# Patient Record
Sex: MALE | Race: WHITE | NOT HISPANIC OR LATINO | Employment: OTHER | URBAN - METROPOLITAN AREA
[De-identification: names, ages, dates, MRNs, and addresses within clinical notes are randomized per-mention and may not be internally consistent; named-entity substitution may affect disease eponyms.]

---

## 2022-05-27 ENCOUNTER — TRANSCRIBE ORDERS (OUTPATIENT)
Dept: ADMINISTRATIVE | Facility: HOSPITAL | Age: 57
End: 2022-05-27

## 2022-05-27 DIAGNOSIS — R06.00 DYSPNEA, UNSPECIFIED TYPE: Primary | ICD-10-CM

## 2022-06-08 ENCOUNTER — HOSPITAL ENCOUNTER (OUTPATIENT)
Dept: CT IMAGING | Facility: HOSPITAL | Age: 57
Discharge: HOME | End: 2022-06-08
Admitting: INTERNAL MEDICINE

## 2022-06-08 DIAGNOSIS — R06.00 DYSPNEA, UNSPECIFIED TYPE: ICD-10-CM

## 2022-06-08 PROCEDURE — 0 IOPAMIDOL PER 1 ML: Performed by: INTERNAL MEDICINE

## 2022-06-08 PROCEDURE — 71275 CT ANGIOGRAPHY CHEST: CPT

## 2022-06-08 RX ADMIN — IOPAMIDOL 100 ML: 755 INJECTION, SOLUTION INTRAVENOUS at 15:01

## 2022-06-21 ENCOUNTER — TRANSCRIBE ORDERS (OUTPATIENT)
Dept: NUCLEAR MEDICINE | Facility: HOSPITAL | Age: 57
End: 2022-06-21

## 2022-06-21 DIAGNOSIS — R06.09 OTHER FORM OF DYSPNEA: Primary | ICD-10-CM

## 2022-07-05 ENCOUNTER — TRANSCRIBE ORDERS (OUTPATIENT)
Dept: ADMINISTRATIVE | Facility: HOSPITAL | Age: 57
End: 2022-07-05

## 2022-07-05 DIAGNOSIS — R06.00 DYSPNEA, UNSPECIFIED TYPE: Primary | ICD-10-CM

## 2022-07-20 ENCOUNTER — HOSPITAL ENCOUNTER (OUTPATIENT)
Dept: CARDIOLOGY | Facility: HOSPITAL | Age: 57
Discharge: HOME | End: 2022-07-20
Admitting: INTERNAL MEDICINE

## 2022-07-20 VITALS
SYSTOLIC BLOOD PRESSURE: 149 MMHG | HEIGHT: 74 IN | DIASTOLIC BLOOD PRESSURE: 87 MMHG | WEIGHT: 315 LBS | BODY MASS INDEX: 40.43 KG/M2

## 2022-07-20 DIAGNOSIS — R06.00 DYSPNEA, UNSPECIFIED TYPE: ICD-10-CM

## 2022-07-20 LAB
AORTIC DIMENSIONLESS INDEX: 0.9 (DI)
BH CV ECHO MEAS - AO MAX PG: 10.1 MMHG
BH CV ECHO MEAS - AO MEAN PG: 5 MMHG
BH CV ECHO MEAS - AO ROOT DIAM: 3.9 CM
BH CV ECHO MEAS - AO V2 MAX: 159 CM/SEC
BH CV ECHO MEAS - AO V2 VTI: 26.6 CM
BH CV ECHO MEAS - AVA(I,D): 3.2 CM2
BH CV ECHO MEAS - EDV(CUBED): 125 ML
BH CV ECHO MEAS - EDV(MOD-SP2): 138 ML
BH CV ECHO MEAS - EDV(MOD-SP4): 140 ML
BH CV ECHO MEAS - EF(MOD-BP): 57.7 %
BH CV ECHO MEAS - EF(MOD-SP2): 59.4 %
BH CV ECHO MEAS - EF(MOD-SP4): 58.6 %
BH CV ECHO MEAS - ESV(CUBED): 23.5 ML
BH CV ECHO MEAS - ESV(MOD-SP2): 56 ML
BH CV ECHO MEAS - ESV(MOD-SP4): 58 ML
BH CV ECHO MEAS - FS: 42.7 %
BH CV ECHO MEAS - IVS/LVPW: 1.08 CM
BH CV ECHO MEAS - IVSD: 1.4 CM
BH CV ECHO MEAS - LAT PEAK E' VEL: 12.2 CM/SEC
BH CV ECHO MEAS - LV DIASTOLIC VOL/BSA (35-75): 52.4 CM2
BH CV ECHO MEAS - LV MASS(C)D: 276.4 GRAMS
BH CV ECHO MEAS - LV MAX PG: 7.6 MMHG
BH CV ECHO MEAS - LV MEAN PG: 4 MMHG
BH CV ECHO MEAS - LV SYSTOLIC VOL/BSA (12-30): 21.7 CM2
BH CV ECHO MEAS - LV V1 MAX: 138 CM/SEC
BH CV ECHO MEAS - LV V1 VTI: 23.6 CM
BH CV ECHO MEAS - LVIDD: 5 CM
BH CV ECHO MEAS - LVIDS: 2.9 CM
BH CV ECHO MEAS - LVOT AREA: 3.6 CM2
BH CV ECHO MEAS - LVOT DIAM: 2.14 CM
BH CV ECHO MEAS - LVPWD: 1.3 CM
BH CV ECHO MEAS - MED PEAK E' VEL: 7.7 CM/SEC
BH CV ECHO MEAS - MV A MAX VEL: 77.6 CM/SEC
BH CV ECHO MEAS - MV DEC SLOPE: 389.5 CM/SEC2
BH CV ECHO MEAS - MV DEC TIME: 0.22 MSEC
BH CV ECHO MEAS - MV E MAX VEL: 74.4 CM/SEC
BH CV ECHO MEAS - MV E/A: 0.96
BH CV ECHO MEAS - MV MAX PG: 3.3 MMHG
BH CV ECHO MEAS - MV MEAN PG: 2.08 MMHG
BH CV ECHO MEAS - MV P1/2T: 66.4 MSEC
BH CV ECHO MEAS - MV V2 VTI: 27.6 CM
BH CV ECHO MEAS - MVA(P1/2T): 3.3 CM2
BH CV ECHO MEAS - MVA(VTI): 3.1 CM2
BH CV ECHO MEAS - PA V2 MAX: 112.3 CM/SEC
BH CV ECHO MEAS - QP/QS: 1.12
BH CV ECHO MEAS - RAP SYSTOLE: 3 MMHG
BH CV ECHO MEAS - RV MAX PG: 3.4 MMHG
BH CV ECHO MEAS - RV V1 MAX: 92.2 CM/SEC
BH CV ECHO MEAS - RV V1 VTI: 18.4 CM
BH CV ECHO MEAS - RVOT DIAM: 2.6 CM
BH CV ECHO MEAS - SI(MOD-SP2): 30.7 ML/M2
BH CV ECHO MEAS - SI(MOD-SP4): 30.7 ML/M2
BH CV ECHO MEAS - SV(LVOT): 85.1 ML
BH CV ECHO MEAS - SV(MOD-SP2): 82 ML
BH CV ECHO MEAS - SV(MOD-SP4): 82 ML
BH CV ECHO MEAS - SV(RVOT): 95.5 ML
BH CV ECHO MEASUREMENTS AVERAGE E/E' RATIO: 7.48
BH CV XLRA - RV BASE: 4 CM
BH CV XLRA - RV LENGTH: 7.6 CM
BH CV XLRA - RV MID: 3.8 CM
BH CV XLRA - TDI S': 16.8 CM/SEC
LEFT ATRIUM VOLUME INDEX: 30.3 ML/M2
MAXIMAL PREDICTED HEART RATE: 166 BPM
SINUS: 3.7 CM
STJ: 3.1 CM
STRESS TARGET HR: 141 BPM

## 2022-07-20 PROCEDURE — 93306 TTE W/DOPPLER COMPLETE: CPT | Performed by: INTERNAL MEDICINE

## 2022-07-20 PROCEDURE — 93306 TTE W/DOPPLER COMPLETE: CPT

## 2022-09-06 ENCOUNTER — HOSPITAL ENCOUNTER (OUTPATIENT)
Facility: HOSPITAL | Age: 57
Setting detail: HOSPITAL OUTPATIENT SURGERY
End: 2022-09-06
Attending: INTERNAL MEDICINE | Admitting: INTERNAL MEDICINE

## 2022-09-06 ENCOUNTER — OFFICE VISIT (OUTPATIENT)
Dept: GASTROENTEROLOGY | Facility: CLINIC | Age: 57
End: 2022-09-06

## 2022-09-06 ENCOUNTER — PREP FOR SURGERY (OUTPATIENT)
Dept: SURGERY | Facility: SURGERY CENTER | Age: 57
End: 2022-09-06

## 2022-09-06 VITALS
DIASTOLIC BLOOD PRESSURE: 96 MMHG | SYSTOLIC BLOOD PRESSURE: 150 MMHG | OXYGEN SATURATION: 97 % | HEART RATE: 114 BPM | HEIGHT: 74 IN | WEIGHT: 315 LBS | BODY MASS INDEX: 40.43 KG/M2 | TEMPERATURE: 98.2 F

## 2022-09-06 DIAGNOSIS — R19.5 POSITIVE FECAL OCCULT BLOOD TEST: ICD-10-CM

## 2022-09-06 DIAGNOSIS — D50.9 IRON DEFICIENCY ANEMIA, UNSPECIFIED IRON DEFICIENCY ANEMIA TYPE: Primary | ICD-10-CM

## 2022-09-06 PROCEDURE — 99203 OFFICE O/P NEW LOW 30 MIN: CPT | Performed by: NURSE PRACTITIONER

## 2022-09-06 RX ORDER — FLUTICASONE PROPIONATE 50 MCG
2 SPRAY, SUSPENSION (ML) NASAL AS NEEDED
COMMUNITY
Start: 2022-07-01

## 2022-09-06 RX ORDER — IPRATROPIUM BROMIDE AND ALBUTEROL SULFATE 2.5; .5 MG/3ML; MG/3ML
3 SOLUTION RESPIRATORY (INHALATION)
COMMUNITY
Start: 2022-08-26 | End: 2022-12-14

## 2022-09-06 RX ORDER — FERROUS SULFATE 325(65) MG
325 TABLET ORAL
COMMUNITY
Start: 2022-06-28

## 2022-09-06 RX ORDER — AMLODIPINE BESYLATE 5 MG/1
5 TABLET ORAL DAILY
COMMUNITY
Start: 2022-06-15

## 2022-09-06 RX ORDER — DEXTROMETHORPHAN HYDROBROMIDE AND PROMETHAZINE HYDROCHLORIDE 15; 6.25 MG/5ML; MG/5ML
SYRUP ORAL 4 TIMES DAILY PRN
COMMUNITY
End: 2022-12-14

## 2022-09-06 RX ORDER — SODIUM CHLORIDE 0.9 % (FLUSH) 0.9 %
10 SYRINGE (ML) INJECTION AS NEEDED
Status: CANCELLED | OUTPATIENT
Start: 2022-09-06

## 2022-09-06 RX ORDER — BENZONATATE 200 MG/1
200 CAPSULE ORAL AS NEEDED
COMMUNITY
Start: 2022-08-03 | End: 2022-12-14

## 2022-09-06 RX ORDER — PANTOPRAZOLE SODIUM 40 MG/1
40 TABLET, DELAYED RELEASE ORAL DAILY
COMMUNITY
Start: 2022-08-28

## 2022-09-06 RX ORDER — CHLORCYCLIZINE HYDROCHLORIDE AND PSEUDOEPHEDRINE HYDROCHLORIDE 25; 60 MG/1; MG/1
1 TABLET ORAL AS NEEDED
COMMUNITY
Start: 2022-06-14

## 2022-09-06 RX ORDER — SODIUM CHLORIDE 0.9 % (FLUSH) 0.9 %
3 SYRINGE (ML) INJECTION EVERY 12 HOURS SCHEDULED
Status: CANCELLED | OUTPATIENT
Start: 2022-09-06

## 2022-09-06 RX ORDER — MELOXICAM 7.5 MG/1
7.5 TABLET ORAL DAILY
COMMUNITY
Start: 2022-06-15

## 2022-09-06 RX ORDER — SODIUM CHLORIDE, SODIUM LACTATE, POTASSIUM CHLORIDE, CALCIUM CHLORIDE 600; 310; 30; 20 MG/100ML; MG/100ML; MG/100ML; MG/100ML
30 INJECTION, SOLUTION INTRAVENOUS CONTINUOUS PRN
Status: CANCELLED | OUTPATIENT
Start: 2022-09-06

## 2022-09-06 NOTE — PROGRESS NOTES
"Chief Complaint   Patient presents with   • Anemia         History of Present Illness  Patient is a 56-year-old male who presents today for evaluation.  He was referred for anemia.  CBC from June 2022 showed hemoglobin of 7.3 and hematocrit 28.0.  He is noted to be microcytic.    Patient reports that the anemia is a new finding.  He has been taking oral iron and reports he had follow-up lab work within the last few weeks that showed hemoglobin had improved to 8.4.  He reports he submitted a stool test for blood which was positive.    He had a COVID infection several months ago and reports he did not eat for around 2-1/2 weeks during that time.  When he resumed eating, he did see some visible blood in the stool described as being bright red but outside of this episode has had no blood in the stool and no dark stools.  Reports regular bowel movements.    Denies any heartburn, reflux, nausea, or vomiting.  Denies any abdominal pain.    Anemia was discovered during work-up for shortness of breath.  Patient has been experiencing a chronic cough for which he reports he has undergone multiple evaluations which were negative.    He has never had a colonoscopy.  Denies any family history of Colon cancer or GI disorders.     Result Review :       CT Angiogram Chest (06/08/2022 15:12)   PROGRESS NOTES - SCAN - PROG NOTE_6/28/22 (06/28/2022)   SCANNED - LABS (06/21/2022)       Vital Signs:   /96   Pulse 114   Temp 98.2 °F (36.8 °C)   Ht 188 cm (74\")   Wt (!) 149 kg (328 lb 3.2 oz)   SpO2 97%   BMI 42.14 kg/m²     Body mass index is 42.14 kg/m².     Physical Exam  Vitals reviewed.   Constitutional:       General: He is not in acute distress.     Appearance: He is well-developed.   HENT:      Head: Normocephalic and atraumatic.   Pulmonary:      Effort: Pulmonary effort is normal. No respiratory distress.   Abdominal:      General: Abdomen is flat. Bowel sounds are normal. There is no distension.      Palpations: " Abdomen is soft.      Tenderness: There is no abdominal tenderness.   Skin:     General: Skin is dry.      Coloration: Skin is not pale.   Neurological:      Mental Status: He is alert and oriented to person, place, and time.   Psychiatric:         Thought Content: Thought content normal.           Assessment and Plan    Diagnoses and all orders for this visit:    1. Iron deficiency anemia, unspecified iron deficiency anemia type (Primary)    2. Positive fecal occult blood test         Discussion  Patient presents today for evaluation of iron deficiency anemia with positive fecal occult blood test.  These are new undiagnosed problems.  Recommended EGD and colonoscopy for further evaluation and to assess for source of bleeding.  If negative, may consider capsule endoscopy.          Follow Up   Return for Follow up to review results after testing complete.    Patient Instructions   Schedule EGD and colonoscopy for further evaluation.

## 2022-09-07 ENCOUNTER — PATIENT ROUNDING (BHMG ONLY) (OUTPATIENT)
Dept: GASTROENTEROLOGY | Facility: CLINIC | Age: 57
End: 2022-09-07

## 2022-09-07 NOTE — PROGRESS NOTES
September 7, 2022    Hello, may I speak with Damián Diaz?    My name is Cherrie      I am  with [facility]  [address] [address] [phone].    Before we get started may I verify your date of birth? 1965-- verified    I am calling to officially welcome you to our practice and ask about your recent visit. Is this a good time to talk? yes    Tell me about your visit with us. What things went well?  no complaints       We're always looking for ways to make our patients' experiences even better. Do you have recommendations on ways we may improve?  no    Overall were you satisfied with your first visit to our practice? yes       I appreciate you taking the time to speak with me today. Is there anything else I can do for you? no      Thank you, and have a great day.

## 2022-10-13 VITALS — WEIGHT: 315 LBS | HEIGHT: 74 IN | BODY MASS INDEX: 40.43 KG/M2

## 2022-10-13 NOTE — SIGNIFICANT NOTE
Education provided the Patient on the following:    - Nothing to Eat or Drink after MN the night before the procedure    - Avoid red/purple fluids while completing their bowel prep as ordered by physician  -Contact Gastrointerologist office for any questions about specific details regarding colon prep    -You will need to have someone drive you home after your colonoscopy and remain with you for 24 hours after the procedure  - The date of your procedure, your are welcome to have one visitor at bedside or remain within 10-15 minutes of [facility]  -Please wear warm socks when you arrive for your procedure  -Remove all jewelry and leave any valuables before arriving the day of your procedure (all will have to be removed before leaving preop)  -You will need to arrive at 1300 on 10/18 procedure    -Feel free to contact us at: [phone] with any additional questions/concerns

## 2022-10-20 ENCOUNTER — PREP FOR SURGERY (OUTPATIENT)
Dept: SURGERY | Facility: SURGERY CENTER | Age: 57
End: 2022-10-20

## 2022-10-20 ENCOUNTER — OFFICE VISIT (OUTPATIENT)
Dept: GASTROENTEROLOGY | Facility: CLINIC | Age: 57
End: 2022-10-20

## 2022-10-20 ENCOUNTER — CONSULT (OUTPATIENT)
Dept: ONCOLOGY | Facility: CLINIC | Age: 57
End: 2022-10-20

## 2022-10-20 ENCOUNTER — LAB (OUTPATIENT)
Dept: OTHER | Facility: HOSPITAL | Age: 57
End: 2022-10-20

## 2022-10-20 VITALS
HEIGHT: 74 IN | DIASTOLIC BLOOD PRESSURE: 82 MMHG | BODY MASS INDEX: 40.43 KG/M2 | HEART RATE: 104 BPM | TEMPERATURE: 97.7 F | WEIGHT: 315 LBS | SYSTOLIC BLOOD PRESSURE: 160 MMHG | OXYGEN SATURATION: 96 %

## 2022-10-20 VITALS
RESPIRATION RATE: 20 BRPM | BODY MASS INDEX: 40.43 KG/M2 | SYSTOLIC BLOOD PRESSURE: 135 MMHG | DIASTOLIC BLOOD PRESSURE: 95 MMHG | OXYGEN SATURATION: 95 % | TEMPERATURE: 97.3 F | HEIGHT: 74 IN | WEIGHT: 315 LBS | HEART RATE: 87 BPM

## 2022-10-20 DIAGNOSIS — D64.9 ANEMIA, UNSPECIFIED TYPE: Primary | ICD-10-CM

## 2022-10-20 DIAGNOSIS — D50.0 IRON DEFICIENCY ANEMIA DUE TO CHRONIC BLOOD LOSS: Primary | ICD-10-CM

## 2022-10-20 DIAGNOSIS — R19.5 POSITIVE FECAL OCCULT BLOOD TEST: ICD-10-CM

## 2022-10-20 DIAGNOSIS — D50.9 IRON DEFICIENCY ANEMIA, UNSPECIFIED IRON DEFICIENCY ANEMIA TYPE: Primary | ICD-10-CM

## 2022-10-20 DIAGNOSIS — R05.3 CHRONIC COUGH: ICD-10-CM

## 2022-10-20 LAB
ANISOCYTOSIS BLD QL: NORMAL
BASOPHILS # BLD AUTO: 0.04 10*3/MM3 (ref 0–0.2)
BASOPHILS NFR BLD AUTO: 0.6 % (ref 0–1.5)
DEPRECATED RDW RBC AUTO: 51.7 FL (ref 37–54)
EOSINOPHIL # BLD AUTO: 0.23 10*3/MM3 (ref 0–0.4)
EOSINOPHIL NFR BLD AUTO: 3.7 % (ref 0.3–6.2)
ERYTHROCYTE [DISTWIDTH] IN BLOOD BY AUTOMATED COUNT: 18.7 % (ref 12.3–15.4)
FERRITIN SERPL-MCNC: 9.1 NG/ML (ref 30–400)
HCT VFR BLD AUTO: 33.2 % (ref 37.5–51)
HGB BLD-MCNC: 9 G/DL (ref 13–17.7)
HGB RETIC QN AUTO: 13.1 PG (ref 29.8–36.1)
HYPOCHROMIA BLD QL: NORMAL
IMM GRANULOCYTES # BLD AUTO: 0.01 10*3/MM3 (ref 0–0.05)
IMM GRANULOCYTES NFR BLD AUTO: 0.2 % (ref 0–0.5)
IMM RETICS NFR: 1.5 % (ref 3–15.8)
IRON 24H UR-MRATE: 14 MCG/DL (ref 59–158)
IRON SATN MFR SERPL: 3 % (ref 20–50)
LYMPHOCYTES # BLD AUTO: 2.17 10*3/MM3 (ref 0.7–3.1)
LYMPHOCYTES NFR BLD AUTO: 34.8 % (ref 19.6–45.3)
MCH RBC QN AUTO: 20.5 PG (ref 26.6–33)
MCHC RBC AUTO-ENTMCNC: 27.1 G/DL (ref 31.5–35.7)
MCV RBC AUTO: 75.5 FL (ref 79–97)
MONOCYTES # BLD AUTO: 0.92 10*3/MM3 (ref 0.1–0.9)
MONOCYTES NFR BLD AUTO: 14.7 % (ref 5–12)
NEUTROPHILS NFR BLD AUTO: 2.87 10*3/MM3 (ref 1.7–7)
NEUTROPHILS NFR BLD AUTO: 46 % (ref 42.7–76)
NRBC BLD AUTO-RTO: 0 /100 WBC (ref 0–0.2)
OVALOCYTES BLD QL SMEAR: NORMAL
PLAT MORPH BLD: NORMAL
PLATELET # BLD AUTO: 327 10*3/MM3 (ref 140–450)
PMV BLD AUTO: 8.9 FL (ref 6–12)
POLYCHROMASIA BLD QL SMEAR: NORMAL
RBC # BLD AUTO: 4.4 10*6/MM3 (ref 4.14–5.8)
RETICS # AUTO: <0.01 10*6/MM3 (ref 0.02–0.13)
RETICS/RBC NFR AUTO: 0.11 % (ref 0.7–1.9)
STOMATOCYTES BLD QL SMEAR: NORMAL
TARGETS BLD QL SMEAR: NORMAL
TIBC SERPL-MCNC: 507 MCG/DL (ref 298–536)
TRANSFERRIN SERPL-MCNC: 340 MG/DL (ref 200–360)
WBC MORPH BLD: NORMAL
WBC NRBC COR # BLD: 6.24 10*3/MM3 (ref 3.4–10.8)

## 2022-10-20 PROCEDURE — 82728 ASSAY OF FERRITIN: CPT | Performed by: INTERNAL MEDICINE

## 2022-10-20 PROCEDURE — 85025 COMPLETE CBC W/AUTO DIFF WBC: CPT | Performed by: INTERNAL MEDICINE

## 2022-10-20 PROCEDURE — 83540 ASSAY OF IRON: CPT | Performed by: INTERNAL MEDICINE

## 2022-10-20 PROCEDURE — 84466 ASSAY OF TRANSFERRIN: CPT | Performed by: INTERNAL MEDICINE

## 2022-10-20 PROCEDURE — 99214 OFFICE O/P EST MOD 30 MIN: CPT | Performed by: NURSE PRACTITIONER

## 2022-10-20 PROCEDURE — 36415 COLL VENOUS BLD VENIPUNCTURE: CPT

## 2022-10-20 PROCEDURE — 85046 RETICYTE/HGB CONCENTRATE: CPT | Performed by: INTERNAL MEDICINE

## 2022-10-20 PROCEDURE — 99204 OFFICE O/P NEW MOD 45 MIN: CPT | Performed by: INTERNAL MEDICINE

## 2022-10-20 PROCEDURE — 85007 BL SMEAR W/DIFF WBC COUNT: CPT | Performed by: INTERNAL MEDICINE

## 2022-10-20 RX ORDER — SODIUM CHLORIDE 9 MG/ML
250 INJECTION, SOLUTION INTRAVENOUS ONCE
Status: CANCELLED | OUTPATIENT
Start: 2022-11-22

## 2022-10-20 RX ORDER — SODIUM CHLORIDE 9 MG/ML
250 INJECTION, SOLUTION INTRAVENOUS ONCE
Status: CANCELLED | OUTPATIENT
Start: 2022-11-01

## 2022-10-20 RX ORDER — ACETAMINOPHEN 325 MG/1
650 TABLET ORAL ONCE
Status: CANCELLED | OUTPATIENT
Start: 2022-11-15

## 2022-10-20 RX ORDER — ACETAMINOPHEN 325 MG/1
650 TABLET ORAL ONCE
Status: CANCELLED | OUTPATIENT
Start: 2022-11-01

## 2022-10-20 RX ORDER — DIPHENHYDRAMINE HCL 25 MG
25 CAPSULE ORAL ONCE
Status: CANCELLED | OUTPATIENT
Start: 2022-11-29

## 2022-10-20 RX ORDER — DIPHENHYDRAMINE HCL 25 MG
25 CAPSULE ORAL ONCE
Status: CANCELLED | OUTPATIENT
Start: 2022-11-01

## 2022-10-20 RX ORDER — DIPHENHYDRAMINE HCL 25 MG
25 CAPSULE ORAL ONCE
Status: CANCELLED | OUTPATIENT
Start: 2022-11-15

## 2022-10-20 RX ORDER — DIPHENHYDRAMINE HCL 25 MG
25 CAPSULE ORAL ONCE
Status: CANCELLED | OUTPATIENT
Start: 2022-11-22

## 2022-10-20 RX ORDER — ACETAMINOPHEN 325 MG/1
650 TABLET ORAL ONCE
Status: CANCELLED | OUTPATIENT
Start: 2022-11-08

## 2022-10-20 RX ORDER — ACETAMINOPHEN 325 MG/1
650 TABLET ORAL ONCE
Status: CANCELLED | OUTPATIENT
Start: 2022-11-22

## 2022-10-20 RX ORDER — ACETAMINOPHEN 325 MG/1
650 TABLET ORAL ONCE
Status: CANCELLED | OUTPATIENT
Start: 2022-11-29

## 2022-10-20 RX ORDER — SODIUM CHLORIDE 0.9 % (FLUSH) 0.9 %
3 SYRINGE (ML) INJECTION EVERY 12 HOURS SCHEDULED
Status: CANCELLED | OUTPATIENT
Start: 2022-11-03

## 2022-10-20 RX ORDER — DIPHENHYDRAMINE HCL 25 MG
25 CAPSULE ORAL ONCE
Status: CANCELLED | OUTPATIENT
Start: 2022-11-08

## 2022-10-20 RX ORDER — PROMETHAZINE HYDROCHLORIDE AND CODEINE PHOSPHATE 6.25; 1 MG/5ML; MG/5ML
SOLUTION ORAL AS NEEDED
COMMUNITY
Start: 2022-10-19 | End: 2022-11-07 | Stop reason: SDUPTHER

## 2022-10-20 RX ORDER — SODIUM CHLORIDE 9 MG/ML
250 INJECTION, SOLUTION INTRAVENOUS ONCE
Status: CANCELLED | OUTPATIENT
Start: 2022-11-15

## 2022-10-20 RX ORDER — SODIUM CHLORIDE 9 MG/ML
250 INJECTION, SOLUTION INTRAVENOUS ONCE
Status: CANCELLED | OUTPATIENT
Start: 2022-11-08

## 2022-10-20 RX ORDER — SODIUM CHLORIDE 9 MG/ML
250 INJECTION, SOLUTION INTRAVENOUS ONCE
Status: CANCELLED | OUTPATIENT
Start: 2022-11-29

## 2022-10-20 RX ORDER — SODIUM CHLORIDE, SODIUM LACTATE, POTASSIUM CHLORIDE, CALCIUM CHLORIDE 600; 310; 30; 20 MG/100ML; MG/100ML; MG/100ML; MG/100ML
30 INJECTION, SOLUTION INTRAVENOUS CONTINUOUS PRN
Status: CANCELLED | OUTPATIENT
Start: 2022-11-03

## 2022-10-20 RX ORDER — SODIUM CHLORIDE 0.9 % (FLUSH) 0.9 %
10 SYRINGE (ML) INJECTION AS NEEDED
Status: CANCELLED | OUTPATIENT
Start: 2022-11-03

## 2022-10-20 NOTE — PROGRESS NOTES
.     REASON FOR CONSULTATION:   Anemia  Provide an opinion on any further workup or treatment                             REQUESTING PHYSICIAN: No ref. provider found  RECORDS OBTAINED:  Records of the patient's history including those obtained from the referring provider were reviewed and summarized in detail.    HISTORY OF PRESENT ILLNESS:  The patient is a 56 y.o. year old male  who is here for follow-up with the above-mentioned history.    We were asked to see this patient urgently by the GI nurse practitioner across the gold today.  Last labs in the [facility] system were in June.  She noted PCP labs last week showed an Hb of around 8.  Chronic cough and has been evaluated by cardiology, pulmonology, ENT with no source of cough found.  Symptoms began after COVID infection early 2022.  Denied bowel complaints or visible blood in stools or dark stools.  Previously heme positive EGD and colonoscopy planned.    6/21/2022: Hb 7.3.  Oral iron daily started.  Patient states early October 2022 Hb around 8.  10/20/2022: Hb 9.  Patient states he cannot tolerate more oral iron due to constipation and abdominal cramping.    Lightheadedness, dyspnea on exertion.  No chest pain.  No melena or hematochezia.  EGD and colonoscopy scheduled for 11/3/2022.    Past Medical History:   Diagnosis Date   • Hypertension      Past Surgical History:   Procedure Laterality Date   • HERNIA REPAIR  1970       MEDICATIONS    Current Outpatient Medications:   •  amLODIPine (NORVASC) 5 MG tablet, , Disp: , Rfl:   •  benzonatate (TESSALON) 200 MG capsule, , Disp: , Rfl:   •  Chlorcyclizine-Pseudoephed (Stahist AD) 25-60 MG tablet, Take 1 tablet 3 times a day by oral route as needed., Disp: , Rfl:   •  ferrous sulfate 325 (65 FE) MG tablet, Take 1 tablet every day by oral route for 90 days., Disp: , Rfl:   •  fluticasone (FLONASE) 50 MCG/ACT nasal spray, , Disp: , Rfl:   •  ipratropium-albuterol (DUO-NEB) 0.5-2.5 mg/3 ml nebulizer, Inhale 3 mL  "4 times a day by nebulization route for 30 days., Disp: , Rfl:   •  meloxicam (MOBIC) 7.5 MG tablet, , Disp: , Rfl:   •  pantoprazole (PROTONIX) 40 MG EC tablet, , Disp: , Rfl:   •  promethazine-codeine (PHENERGAN with CODEINE) 6.25-10 MG/5ML solution, , Disp: , Rfl:   •  promethazine-dextromethorphan (PROMETHAZINE-DM) 6.25-15 MG/5ML syrup, Take 5 mL every 4 hours by oral route as needed., Disp: , Rfl:     ALLERGIES:   No Known Allergies    SOCIAL HISTORY:       Social History     Socioeconomic History   • Marital status: Single   Tobacco Use   • Smoking status: Never   • Smokeless tobacco: Never   Vaping Use   • Vaping Use: Some days   Substance and Sexual Activity   • Alcohol use: Yes     Comment: rarely   • Drug use: Never   • Sexual activity: Defer         FAMILY HISTORY:  Family History   Problem Relation Age of Onset   • Colon cancer Neg Hx    • Crohn's disease Neg Hx    • Colon polyps Neg Hx    • Irritable bowel syndrome Neg Hx    • Ulcerative colitis Neg Hx        REVIEW OF SYSTEMS:  Review of Systems   Constitutional: Negative for activity change.   HENT: Negative for nosebleeds and trouble swallowing.    Respiratory: Negative for shortness of breath and wheezing.    Cardiovascular: Negative for chest pain and palpitations.   Gastrointestinal: Positive for constipation. Negative for diarrhea and nausea.   Genitourinary: Negative for dysuria and hematuria.   Musculoskeletal: Negative for arthralgias and myalgias.   Neurological: Negative for seizures and syncope.   Hematological: Negative for adenopathy. Does not bruise/bleed easily.   Psychiatric/Behavioral: Negative for confusion.              Vitals:    10/20/22 1121   BP: 135/95   Pulse: 87   Resp: 20   Temp: 97.3 °F (36.3 °C)   TempSrc: Temporal   SpO2: 95%   Weight: (!) 146 kg (322 lb 6.4 oz)   Height: 188 cm (74.02\")   PainSc: 0-No pain     Current Status 10/20/2022   ECOG score 0      PHYSICAL EXAM:      CONSTITUTIONAL:  Vital signs reviewed.  No " distress, looks comfortable.  EYES:  Conjunctivae and lids unremarkable.  PERRLA  EARS,NOSE,MOUTH,THROAT:  Ears and nose appear unremarkable.  Lips, teeth, gums appear unremarkable.  RESPIRATORY:  Normal respiratory effort.  Lungs clear to auscultation bilaterally.  CARDIOVASCULAR:  Normal S1, S2.  No murmurs rubs or gallops.  No significant lower extremity edema.  GASTROINTESTINAL: Abdomen appears unremarkable.  Nontender.  No hepatomegaly.  No splenomegaly.  LYMPHATIC:  No cervical, supraclavicular, axillary lymphadenopathy.  MUSCULOSKELETAL:  Unremarkable gait and station.  Unremarkable digits/nails.  No cyanosis or clubbing.  SKIN:  Warm.  No rashes.  PSYCHIATRIC:  Normal judgment and insight.  Normal mood and affect.      RECENT LABS:        WBC   Date Value Ref Range Status   10/20/2022 6.24 3.40 - 10.80 10*3/mm3 Final     Hemoglobin   Date Value Ref Range Status   10/20/2022 9.0 (L) 13.0 - 17.7 g/dL Final     Platelets   Date Value Ref Range Status   10/20/2022 327 140 - 450 10*3/mm3 Final       Assessment & Plan   There are no diagnoses linked to this encounter.      Damián Diaz   *Iron deficiency anemia  · 6/21/2022: Hb 7.3.  Oral iron daily started.  · Patient states early October 2022 Hb around 8.  · 10/20/2022: Ferritin 9.  3% saturation.  Hb 9.  Patient states he cannot tolerate oral iron anymore due to constipation and abdominal cramping.  Injectafer x2 doses if insurance will allow.  If not, Venofer 200 mg x 5 doses  · I explained IV iron as a possibility of life-threatening allergic reactions.  Patient understands this and wants to proceed.     *Source of iron deficiency  · Following with GI (GI referred to us to consider IV iron)  · EGD and colonoscopy planned 11/3/2022    *Microcytosis, likely due to iron deficiency  MCV 75.5    *Lightheadedness, dyspnea on exertion, fatigue.  Hopefully this will improve with IV iron.    *Class III obesity.  Being overweight can lead to cytopenias through hepatic  steatosis.    Body mass index is 41.38 kg/m².  BMI 25 to <30 is overweight  BMI 30 to <35 is class 1 obesity  BMI 35 to <40 is class 2 obesity  BMI 40 or higher is class 3 obesity   Ideally, lose weight    Plan  · IV iron  · MD 6 to 8 weeks.  1 day prior: Ferritin, iron panel, CBC, reticulate hemoglobin  · Stop oral iron    Records reviewed and summarized.  This was my first time meeting the patient

## 2022-10-20 NOTE — PROGRESS NOTES
"Chief Complaint   Patient presents with   • GI Problem         History of Present Illness  Patient is a 56-year-old male who presents today for Follow-up.  He was seen in the office September 2022 for iron deficiency anemia with positive fecal occult blood test.  EGD and colonoscopy are planned for further evaluation.    Patient reports he has had continued symptoms since his last office visit.  He had lab work with his primary care provider last week that showed hemoglobin was around 8.  He continues to report shortness of breath and fatigue.  Continues to report chronic cough that can be severe and at times causes him to have syncopal episodes.    He has had extensive work-up to date including cardiology, pulmonology, and ENT evaluation with no source of the cough found.  Symptoms did begin following COVID infection earlier in the year.    He denies any heartburn, reflux, nausea, or vomiting.  Denies any abdominal pain.  Denies any bowel complaints, visible blood in stool, or dark stools.    He has been taking oral iron for several months with minimal globin.     Result Review :       Office Visit with Delores Norman APRN (09/06/2022)   CT Angiogram Chest (06/08/2022 15:12)   PROGRESS NOTES - SCAN - PROG NOTE_6/28/22 (06/28/2022)   SCANNED - LABS (06/21/2022)       Vital Signs:   /82   Pulse 104   Temp 97.7 °F (36.5 °C)   Ht 188 cm (74\")   Wt (!) 146 kg (322 lb 1.6 oz)   SpO2 96%   BMI 41.36 kg/m²     Body mass index is 41.36 kg/m².     Physical Exam  Vitals reviewed.   Constitutional:       General: He is not in acute distress.     Appearance: He is well-developed.   HENT:      Head: Normocephalic and atraumatic.   Pulmonary:      Effort: Pulmonary effort is normal. No respiratory distress.   Abdominal:      General: Abdomen is flat. Bowel sounds are normal. There is no distension.      Palpations: Abdomen is soft.      Tenderness: There is no abdominal tenderness.   Skin:     General: Skin is dry. "      Coloration: Skin is not pale.   Neurological:      Mental Status: He is alert and oriented to person, place, and time.   Psychiatric:         Thought Content: Thought content normal.           Assessment and Plan    Diagnoses and all orders for this visit:    1. Iron deficiency anemia, unspecified iron deficiency anemia type (Primary)  -     Ambulatory Referral to Hematology    2. Positive fecal occult blood test    3. Chronic cough         Discussion  Patient presents today for follow-up with persistent symptoms.  We will arrange for EGD and colonoscopy to be performed as quickly as possible to evaluate anemia.  Due to persistent anemia with minimal improvement with oral iron replacement, recommend hematology consultation.  Regarding chronic cough, if GI work-up negative and this persists, discussed consideration for referral to [facility] clinic to see if they have any additional recommendations that could provide.          Follow Up   Return for Follow up to review results after testing complete.    Patient Instructions   Proceed with EGD and colonoscopy for further evaluation of anemia and positive fecal occult blood test.    Refer to hematology for further evaluation and management of anemia.

## 2022-10-20 NOTE — PATIENT INSTRUCTIONS
Proceed with EGD and colonoscopy for further evaluation of anemia and positive fecal occult blood test.    Refer to hematology for further evaluation and management of anemia.

## 2022-10-27 ENCOUNTER — APPOINTMENT (OUTPATIENT)
Dept: ONCOLOGY | Facility: HOSPITAL | Age: 57
End: 2022-10-27

## 2022-11-01 ENCOUNTER — INFUSION (OUTPATIENT)
Dept: ONCOLOGY | Facility: HOSPITAL | Age: 57
End: 2022-11-01

## 2022-11-01 VITALS
WEIGHT: 315 LBS | TEMPERATURE: 97.1 F | OXYGEN SATURATION: 98 % | HEIGHT: 74 IN | HEART RATE: 82 BPM | SYSTOLIC BLOOD PRESSURE: 150 MMHG | DIASTOLIC BLOOD PRESSURE: 99 MMHG | BODY MASS INDEX: 40.43 KG/M2 | RESPIRATION RATE: 18 BRPM

## 2022-11-01 DIAGNOSIS — D50.0 IRON DEFICIENCY ANEMIA DUE TO CHRONIC BLOOD LOSS: Primary | ICD-10-CM

## 2022-11-01 PROCEDURE — 96365 THER/PROPH/DIAG IV INF INIT: CPT

## 2022-11-01 PROCEDURE — 63710000001 DIPHENHYDRAMINE PER 50 MG: Performed by: INTERNAL MEDICINE

## 2022-11-01 PROCEDURE — 25010000002 IRON SUCROSE PER 1 MG: Performed by: INTERNAL MEDICINE

## 2022-11-01 RX ORDER — SODIUM CHLORIDE 9 MG/ML
250 INJECTION, SOLUTION INTRAVENOUS ONCE
Status: COMPLETED | OUTPATIENT
Start: 2022-11-01 | End: 2022-11-01

## 2022-11-01 RX ORDER — DIPHENHYDRAMINE HCL 25 MG
25 CAPSULE ORAL ONCE
Status: COMPLETED | OUTPATIENT
Start: 2022-11-01 | End: 2022-11-01

## 2022-11-01 RX ORDER — ACETAMINOPHEN 325 MG/1
650 TABLET ORAL ONCE
Status: COMPLETED | OUTPATIENT
Start: 2022-11-01 | End: 2022-11-01

## 2022-11-01 RX ADMIN — IRON SUCROSE 200 MG: 20 INJECTION, SOLUTION INTRAVENOUS at 10:38

## 2022-11-01 RX ADMIN — ACETAMINOPHEN 650 MG: 325 TABLET ORAL at 10:18

## 2022-11-01 RX ADMIN — SODIUM CHLORIDE 250 ML: 9 INJECTION, SOLUTION INTRAVENOUS at 10:18

## 2022-11-01 RX ADMIN — DIPHENHYDRAMINE HYDROCHLORIDE 25 MG: 25 CAPSULE ORAL at 10:18

## 2022-11-03 ENCOUNTER — ANESTHESIA (OUTPATIENT)
Dept: GASTROENTEROLOGY | Facility: HOSPITAL | Age: 57
End: 2022-11-03

## 2022-11-03 ENCOUNTER — HOSPITAL ENCOUNTER (OUTPATIENT)
Facility: HOSPITAL | Age: 57
Setting detail: HOSPITAL OUTPATIENT SURGERY
Discharge: HOME | End: 2022-11-03
Attending: INTERNAL MEDICINE | Admitting: INTERNAL MEDICINE

## 2022-11-03 ENCOUNTER — ANESTHESIA EVENT (OUTPATIENT)
Dept: GASTROENTEROLOGY | Facility: HOSPITAL | Age: 57
End: 2022-11-03

## 2022-11-03 VITALS
DIASTOLIC BLOOD PRESSURE: 77 MMHG | HEIGHT: 74 IN | WEIGHT: 314 LBS | OXYGEN SATURATION: 96 % | RESPIRATION RATE: 16 BRPM | HEART RATE: 95 BPM | SYSTOLIC BLOOD PRESSURE: 120 MMHG | BODY MASS INDEX: 40.3 KG/M2

## 2022-11-03 DIAGNOSIS — D50.9 IRON DEFICIENCY ANEMIA, UNSPECIFIED IRON DEFICIENCY ANEMIA TYPE: ICD-10-CM

## 2022-11-03 DIAGNOSIS — R19.5 POSITIVE FECAL OCCULT BLOOD TEST: ICD-10-CM

## 2022-11-03 PROCEDURE — 45385 COLONOSCOPY W/LESION REMOVAL: CPT | Performed by: INTERNAL MEDICINE

## 2022-11-03 PROCEDURE — 25010000002 PROPOFOL 10 MG/ML EMULSION: Performed by: NURSE ANESTHETIST, CERTIFIED REGISTERED

## 2022-11-03 PROCEDURE — 88342 IMHCHEM/IMCYTCHM 1ST ANTB: CPT | Performed by: INTERNAL MEDICINE

## 2022-11-03 PROCEDURE — 43239 EGD BIOPSY SINGLE/MULTIPLE: CPT | Performed by: INTERNAL MEDICINE

## 2022-11-03 PROCEDURE — 88305 TISSUE EXAM BY PATHOLOGIST: CPT | Performed by: INTERNAL MEDICINE

## 2022-11-03 PROCEDURE — 45381 COLONOSCOPY SUBMUCOUS NJX: CPT | Performed by: INTERNAL MEDICINE

## 2022-11-03 PROCEDURE — 45380 COLONOSCOPY AND BIOPSY: CPT | Performed by: INTERNAL MEDICINE

## 2022-11-03 RX ORDER — SODIUM CHLORIDE, SODIUM LACTATE, POTASSIUM CHLORIDE, CALCIUM CHLORIDE 600; 310; 30; 20 MG/100ML; MG/100ML; MG/100ML; MG/100ML
30 INJECTION, SOLUTION INTRAVENOUS CONTINUOUS PRN
Status: DISCONTINUED | OUTPATIENT
Start: 2022-11-03 | End: 2022-11-03 | Stop reason: HOSPADM

## 2022-11-03 RX ORDER — LIDOCAINE HYDROCHLORIDE 20 MG/ML
INJECTION, SOLUTION INFILTRATION; PERINEURAL AS NEEDED
Status: DISCONTINUED | OUTPATIENT
Start: 2022-11-03 | End: 2022-11-03 | Stop reason: SURG

## 2022-11-03 RX ORDER — GLYCOPYRROLATE 0.2 MG/ML
INJECTION INTRAMUSCULAR; INTRAVENOUS AS NEEDED
Status: DISCONTINUED | OUTPATIENT
Start: 2022-11-03 | End: 2022-11-03 | Stop reason: SURG

## 2022-11-03 RX ORDER — PROPOFOL 10 MG/ML
VIAL (ML) INTRAVENOUS AS NEEDED
Status: DISCONTINUED | OUTPATIENT
Start: 2022-11-03 | End: 2022-11-03 | Stop reason: SURG

## 2022-11-03 RX ORDER — ONDANSETRON 2 MG/ML
4 INJECTION INTRAMUSCULAR; INTRAVENOUS ONCE AS NEEDED
Status: DISCONTINUED | OUTPATIENT
Start: 2022-11-03 | End: 2022-11-03 | Stop reason: HOSPADM

## 2022-11-03 RX ADMIN — PROPOFOL 300 MCG/KG/MIN: 10 INJECTION, EMULSION INTRAVENOUS at 13:30

## 2022-11-03 RX ADMIN — SODIUM CHLORIDE, POTASSIUM CHLORIDE, SODIUM LACTATE AND CALCIUM CHLORIDE 30 ML/HR: 600; 310; 30; 20 INJECTION, SOLUTION INTRAVENOUS at 13:24

## 2022-11-03 RX ADMIN — PROPOFOL 130 MG: 10 INJECTION, EMULSION INTRAVENOUS at 13:30

## 2022-11-03 RX ADMIN — LIDOCAINE HYDROCHLORIDE 100 MG: 20 INJECTION, SOLUTION INFILTRATION; PERINEURAL at 13:30

## 2022-11-03 RX ADMIN — GLYCOPYRROLATE 0.2 MG: 0.2 INJECTION INTRAMUSCULAR; INTRAVENOUS at 13:29

## 2022-11-03 NOTE — H&P
No chief complaint on file.      HPI  Pos fecal occult blood  anemia         Problem List:    Patient Active Problem List   Diagnosis   • Iron deficiency anemia   • Positive fecal occult blood test       Medical History:    Past Medical History:   Diagnosis Date   • Chronic cough     SINCE COVID DIAGNOSIS 9/2021   • History of COVID-19 09/2021   • Hypertension    • Iron deficiency anemia         Social History:    Social History     Socioeconomic History   • Marital status: Single   Tobacco Use   • Smoking status: Never   • Smokeless tobacco: Never   Substance and Sexual Activity   • Alcohol use: Yes     Comment: rarely   • Drug use: Never   • Sexual activity: Defer       Family History:   Family History   Problem Relation Age of Onset   • Heart failure Mother    • Clotting disorder Father    • Hypertension Brother    • Colon cancer Neg Hx    • Crohn's disease Neg Hx    • Colon polyps Neg Hx    • Irritable bowel syndrome Neg Hx    • Ulcerative colitis Neg Hx    • Malig Hyperthermia Neg Hx        Surgical History:   Past Surgical History:   Procedure Laterality Date   • HERNIA REPAIR  1970   • LACERATION REPAIR Right     THUMB/ HAND       No current facility-administered medications for this encounter.    Current Outpatient Medications:   •  amLODIPine (NORVASC) 5 MG tablet, Take 1 tablet by mouth Daily., Disp: , Rfl:   •  benzonatate (TESSALON) 200 MG capsule, Take 1 capsule by mouth As Needed., Disp: , Rfl:   •  Chlorcyclizine-Pseudoephed (Stahist AD) 25-60 MG tablet, Take  by mouth As Needed. HELD FOR OR, Disp: , Rfl:   •  ferrous sulfate 325 (65 FE) MG tablet, Take 1 tablet by mouth Daily With Breakfast., Disp: , Rfl:   •  fluticasone (FLONASE) 50 MCG/ACT nasal spray, 2 sprays into the nostril(s) as directed by provider As Needed., Disp: , Rfl:   •  ipratropium-albuterol (DUO-NEB) 0.5-2.5 mg/3 ml nebulizer, Take 3 mL by nebulization 4 (Four) Times a Day., Disp: , Rfl:   •  meloxicam (MOBIC) 7.5 MG tablet, Take 1  tablet by mouth Daily., Disp: , Rfl:   •  pantoprazole (PROTONIX) 40 MG EC tablet, Take 1 tablet by mouth Daily., Disp: , Rfl:   •  promethazine-codeine (PHENERGAN with CODEINE) 6.25-10 MG/5ML solution, Take  by mouth As Needed., Disp: , Rfl:   •  promethazine-dextromethorphan (PROMETHAZINE-DM) 6.25-15 MG/5ML syrup, Take  by mouth 4 (Four) Times a Day As Needed., Disp: , Rfl:     Allergies: No Known Allergies     The following portions of the patient's history were reviewed by me and updated as appropriate: review of systems, allergies, current medications, past family history, past medical history, past social history, past surgical history and problem list.    There were no vitals filed for this visit.    PHYSICAL EXAM:    CONSTITUTIONAL:  today's vital signs reviewed by me  GASTROINTESTINAL: abdomen is soft nontender nondistended with normal active bowel sounds, no masses are appreciated    Assessment/ Plan  Pos fecal occult blood  Anemia    egd and colonoscopy    Risks and benefits as well as alternatives to endoscopic evaluation were explained to the patient and they voiced understanding and wish to proceed.  These risks include but are not limited to the risk of bleeding, perforation, adverse reaction to sedation, and missed lesions.  The patient was given the opportunity to ask questions prior to the endoscopic procedure.

## 2022-11-03 NOTE — ANESTHESIA PREPROCEDURE EVALUATION
Anesthesia Evaluation     Patient summary reviewed and Nursing notes reviewed   no history of anesthetic complications:  NPO Solid Status: > 6 hours  NPO Liquid Status: > 6 hours           Airway   Mallampati: II  TM distance: >3 FB  Neck ROM: full  no difficulty expected and No difficulty expected  Dental - normal exam     Pulmonary - negative pulmonary ROS and normal exam    breath sounds clear to auscultation  (-) rhonchi, decreased breath sounds, wheezes, rales, stridor  Cardiovascular - normal exam    NYHA Classification: I  Rhythm: regular  Rate: normal    (+) hypertension,   (-) murmur, weak pulses, friction rub, systolic click, carotid bruits, JVD, peripheral edema      Neuro/Psych- negative ROS  GI/Hepatic/Renal/Endo    (+) obesity,  GI bleeding ,     Musculoskeletal (-) negative ROS    Abdominal  - normal exam    Abdomen: soft.   Substance History - negative use     OB/GYN negative ob/gyn ROS         Other   blood dyscrasia anemia,                     Anesthesia Plan    ASA 2     MAC     intravenous induction     Anesthetic plan, risks, benefits, and alternatives have been provided, discussed and informed consent has been obtained with: patient.        CODE STATUS:

## 2022-11-03 NOTE — ANESTHESIA POSTPROCEDURE EVALUATION
"Patient: Damián Diaz    Procedure Summary     Date: 11/03/22 Room / Location:  [facility]    Anesthesia Start: 1327 Anesthesia Stop: 1401    Procedures:       ESOPHAGOGASTRODUODENOSCOPYr with bx (Esophagus)      COLONOSCOPY into cecum with tattoo ink injection, bx's and cold bx/snare polypectomies Diagnosis:       Iron deficiency anemia, unspecified iron deficiency anemia type      Positive fecal occult blood test      (Iron deficiency anemia, unspecified iron deficiency anemia type [D50.9])      (Positive fecal occult blood test [R19.5])    Surgeons: Anup Oconnell MD Provider: Mor Stephenson MD    Anesthesia Type: MAC ASA Status: 2          Anesthesia Type: MAC    Vitals  Vitals Value Taken Time   /82 11/03/22 1409   Temp     Pulse 100 11/03/22 1409   Resp 16 11/03/22 1409   SpO2 100 % 11/03/22 1409           Post Anesthesia Care and Evaluation    Patient location during evaluation: bedside  Patient participation: complete - patient participated  Level of consciousness: awake and alert  Pain management: adequate    Airway patency: patent  Anesthetic complications: No anesthetic complications    Cardiovascular status: acceptable  Respiratory status: acceptable  Hydration status: acceptable    Comments: /82 (BP Location: Left arm, Patient Position: Lying)   Pulse 100   Resp 16   Ht 188 cm (74\")   Wt (!) 142 kg (314 lb)   SpO2 100%   BMI 40.32 kg/m²           "

## 2022-11-03 NOTE — DISCHARGE INSTRUCTIONS
For the next 24 hours patient needs to be with a responsible adult.    For 24 hours DO NOT drive, operate machinery, appliances, drink alcohol, make important decisions or sign legal documents.    Start with a light or bland diet if you are feeling sick to your stomach otherwise advance to regular diet as tolerated.    Follow recommendations on procedure report if provided by your doctor.    Call Dr Oconnell for problems [phone]    Problems may include but not limited to: large amounts of bleeding, trouble breathing, repeated vomiting, severe unrelieved pain, fever or chills.

## 2022-11-04 ENCOUNTER — APPOINTMENT (OUTPATIENT)
Dept: ONCOLOGY | Facility: HOSPITAL | Age: 57
End: 2022-11-04

## 2022-11-04 DIAGNOSIS — D50.0 IRON DEFICIENCY ANEMIA DUE TO CHRONIC BLOOD LOSS: Primary | ICD-10-CM

## 2022-11-04 DIAGNOSIS — D50.0 IRON DEFICIENCY ANEMIA DUE TO CHRONIC BLOOD LOSS: ICD-10-CM

## 2022-11-04 DIAGNOSIS — C18.9 ADENOCARCINOMA OF COLON: Primary | ICD-10-CM

## 2022-11-04 DIAGNOSIS — C18.9 ADENOCARCINOMA OF COLON: ICD-10-CM

## 2022-11-07 ENCOUNTER — HOSPITAL ENCOUNTER (OUTPATIENT)
Dept: CT IMAGING | Facility: HOSPITAL | Age: 57
Discharge: HOME | End: 2022-11-07

## 2022-11-07 ENCOUNTER — LAB (OUTPATIENT)
Dept: LAB | Facility: HOSPITAL | Age: 57
End: 2022-11-07

## 2022-11-07 ENCOUNTER — HOSPITAL ENCOUNTER (OUTPATIENT)
Dept: CARDIOLOGY | Facility: HOSPITAL | Age: 57
Discharge: HOME | End: 2022-11-07

## 2022-11-07 ENCOUNTER — OFFICE VISIT (OUTPATIENT)
Dept: SURGERY | Facility: CLINIC | Age: 57
End: 2022-11-07

## 2022-11-07 VITALS — WEIGHT: 315 LBS | BODY MASS INDEX: 40.43 KG/M2 | HEIGHT: 74 IN

## 2022-11-07 DIAGNOSIS — D50.0 IRON DEFICIENCY ANEMIA DUE TO CHRONIC BLOOD LOSS: ICD-10-CM

## 2022-11-07 DIAGNOSIS — C18.2 CANCER OF ASCENDING COLON: Primary | ICD-10-CM

## 2022-11-07 DIAGNOSIS — C18.2 CANCER OF ASCENDING COLON: ICD-10-CM

## 2022-11-07 LAB
ALBUMIN SERPL-MCNC: 4.2 G/DL (ref 3.5–5.2)
ALBUMIN/GLOB SERPL: 1.6 G/DL
ALP SERPL-CCNC: 61 U/L (ref 39–117)
ALT SERPL W P-5'-P-CCNC: 14 U/L (ref 1–41)
ANION GAP SERPL CALCULATED.3IONS-SCNC: 11.6 MMOL/L (ref 5–15)
ANISOCYTOSIS BLD QL: NORMAL
AST SERPL-CCNC: 19 U/L (ref 1–40)
BASOPHILS # BLD MANUAL: 0.07 10*3/MM3 (ref 0–0.2)
BASOPHILS NFR BLD MANUAL: 1 % (ref 0–1.5)
BILIRUB SERPL-MCNC: <0.2 MG/DL (ref 0–1.2)
BUN SERPL-MCNC: 11 MG/DL (ref 6–20)
BUN/CREAT SERPL: 11.6 (ref 7–25)
CALCIUM SPEC-SCNC: 9 MG/DL (ref 8.6–10.5)
CHLORIDE SERPL-SCNC: 103 MMOL/L (ref 98–107)
CO2 SERPL-SCNC: 24.4 MMOL/L (ref 22–29)
CREAT SERPL-MCNC: 0.95 MG/DL (ref 0.76–1.27)
DEPRECATED RDW RBC AUTO: 41.7 FL (ref 37–54)
EGFRCR SERPLBLD CKD-EPI 2021: 94.5 ML/MIN/1.73
EOSINOPHIL # BLD MANUAL: 0.07 10*3/MM3 (ref 0–0.4)
EOSINOPHIL NFR BLD MANUAL: 1 % (ref 0.3–6.2)
ERYTHROCYTE [DISTWIDTH] IN BLOOD BY AUTOMATED COUNT: 16.8 % (ref 12.3–15.4)
GLOBULIN UR ELPH-MCNC: 2.7 GM/DL
GLUCOSE SERPL-MCNC: 90 MG/DL (ref 65–99)
HCT VFR BLD AUTO: 29.4 % (ref 37.5–51)
HGB BLD-MCNC: 8.4 G/DL (ref 13–17.7)
LAB AP CASE REPORT: NORMAL
LYMPHOCYTES # BLD MANUAL: 2.09 10*3/MM3 (ref 0.7–3.1)
LYMPHOCYTES NFR BLD MANUAL: 9 % (ref 5–12)
MCH RBC QN AUTO: 19.9 PG (ref 26.6–33)
MCHC RBC AUTO-ENTMCNC: 28.6 G/DL (ref 31.5–35.7)
MCV RBC AUTO: 69.7 FL (ref 79–97)
MICROCYTES BLD QL: NORMAL
MONOCYTES # BLD: 0.65 10*3/MM3 (ref 0.1–0.9)
NEUTROPHILS # BLD AUTO: 4.33 10*3/MM3 (ref 1.7–7)
NEUTROPHILS NFR BLD MANUAL: 60 % (ref 42.7–76)
PATH REPORT.ADDENDUM SPEC: NORMAL
PATH REPORT.FINAL DX SPEC: NORMAL
PATH REPORT.GROSS SPEC: NORMAL
PLAT MORPH BLD: NORMAL
PLATELET # BLD AUTO: 330 10*3/MM3 (ref 140–450)
PMV BLD AUTO: 8.9 FL (ref 6–12)
POIKILOCYTOSIS BLD QL SMEAR: NORMAL
POLYCHROMASIA BLD QL SMEAR: NORMAL
POTASSIUM SERPL-SCNC: 4.4 MMOL/L (ref 3.5–5.2)
PROT SERPL-MCNC: 6.9 G/DL (ref 6–8.5)
QT INTERVAL: 388 MS
RBC # BLD AUTO: 4.22 10*6/MM3 (ref 4.14–5.8)
SODIUM SERPL-SCNC: 139 MMOL/L (ref 136–145)
VARIANT LYMPHS NFR BLD MANUAL: 29 % (ref 19.6–45.3)
WBC MORPH BLD: NORMAL
WBC NRBC COR # BLD: 7.22 10*3/MM3 (ref 3.4–10.8)

## 2022-11-07 PROCEDURE — 80053 COMPREHEN METABOLIC PANEL: CPT

## 2022-11-07 PROCEDURE — 93010 ELECTROCARDIOGRAM REPORT: CPT | Performed by: STUDENT IN AN ORGANIZED HEALTH CARE EDUCATION/TRAINING PROGRAM

## 2022-11-07 PROCEDURE — 0 IOPAMIDOL PER 1 ML

## 2022-11-07 PROCEDURE — 99204 OFFICE O/P NEW MOD 45 MIN: CPT

## 2022-11-07 PROCEDURE — 93005 ELECTROCARDIOGRAM TRACING: CPT

## 2022-11-07 PROCEDURE — 85025 COMPLETE CBC W/AUTO DIFF WBC: CPT

## 2022-11-07 PROCEDURE — 74177 CT ABD & PELVIS W/CONTRAST: CPT

## 2022-11-07 PROCEDURE — 36415 COLL VENOUS BLD VENIPUNCTURE: CPT

## 2022-11-07 PROCEDURE — 85007 BL SMEAR W/DIFF WBC COUNT: CPT

## 2022-11-07 RX ORDER — METRONIDAZOLE 500 MG/100ML
500 INJECTION, SOLUTION INTRAVENOUS ONCE
Status: CANCELLED | OUTPATIENT
Start: 2022-11-07 | End: 2022-11-07

## 2022-11-07 RX ORDER — ALVIMOPAN 12 MG/1
12 CAPSULE ORAL ONCE
Status: CANCELLED | OUTPATIENT
Start: 2022-11-07 | End: 2022-11-07

## 2022-11-07 RX ORDER — METRONIDAZOLE 500 MG/1
500 TABLET ORAL 3 TIMES DAILY
Qty: 3 TABLET | Refills: 0 | Status: SHIPPED | OUTPATIENT
Start: 2022-11-07 | End: 2022-11-08

## 2022-11-07 RX ORDER — CEFAZOLIN SODIUM 2 G/100ML
2 INJECTION, SOLUTION INTRAVENOUS ONCE
Status: CANCELLED | OUTPATIENT
Start: 2022-11-11 | End: 2022-11-07

## 2022-11-07 RX ORDER — NEOMYCIN SULFATE 500 MG/1
1000 TABLET ORAL 3 TIMES DAILY
Qty: 6 TABLET | Refills: 0 | Status: SHIPPED | OUTPATIENT
Start: 2022-11-07 | End: 2022-11-08

## 2022-11-07 RX ORDER — METRONIDAZOLE 500 MG/100ML
500 INJECTION, SOLUTION INTRAVENOUS ONCE
Status: CANCELLED | OUTPATIENT
Start: 2022-11-11 | End: 2022-11-07

## 2022-11-07 RX ADMIN — IOPAMIDOL 100 ML: 755 INJECTION, SOLUTION INTRAVENOUS at 14:35

## 2022-11-07 NOTE — H&P (VIEW-ONLY)
SUMMARY (A/P):  57-year-old male with invasive moderately differentiated adenocarcinoma in the ascending colon. Dr. Tolentino and I reviewed his colonoscopy and EGD findings and pathology report.  A CT abdomen/pelvis was ordered but has not yet been completed. Order placed for this to be completed stat; our office will arrange for him to have this completed later today. Recommend patient proceed with laparoscopic right colon resection. Discussed the nature of the procedure, benefits and risks, including but not limited to bleeding, infection, converting to an open procedure, risk of anastomotic leak requiring reoperation and temporary colostomy.  Mechanical bowel prep instructions given and oral antibiotic preparation prescription sent to his pharmacy. Patient verbalized understanding and would like to proceed with surgery.  All questions were answered.  Dr. Tolentino is going to plan for surgery this coming Friday 11/11/2022.    CC: Colon cancer    HPI: Mr. Diaz is a pleasant 57-year-old male who presents today to discuss recent colonoscopy results of invasive adenocarcinoma in the ascending colon.  He reports in September 2021 he was diagnosed with COVID.  Since then he has had shortness of breath and a lingering cough causing fainting episodes. In June, he was still having significant coughing and shortness of breath and was evaluated further. He was diagnosed with iron deficiency anemia. He has since been on iron pills as well as undergoing iron infusions. Patient underwent EGD and colonoscopy for further work-up of iron deficiency anemia.  His upper endoscopy was unremarkable.  On colonoscopy, an ascending colon polyp returned as invasive adenocarcinoma. A transverse polyp and rectal polyp revealed tubular adenoma. Patient denies any blood in his stool recently.  He states he had an episode about a year ago where he did have rectal bleeding when wiping, no gross bleeding in the toilet.  He denies any nausea,  vomiting, constipation, or diarrhea. He states since his colonoscopy he has had mild abdominal pain but prior to this denies any symptoms.  He denies any family history of colon cancer.  He denies any prior abdominal surgeries.  He works in construction.    ENDOSCOPY:   • 11/3/2022 EGD, Dr. Oconnell: Normal esophagus, 1 cm hiatal hernia  • 11/3/2022 Colonoscopy, Dr. Oconnell: Rectal polyp, transverse colon polyp, ascending colon polyp, partially obstructing tumor in the proximal ascending colon  Final Diagnosis   1. Duodenum, Biopsy: Small bowel mucosa with             A. Normal intact villous surface.               B. No significant inflammation, no granulomas.               C: No viral inclusions or other organisms on routinely stained sections.  2. Stomach, Biopsy: Oxyntic type gastric mucosa with               A. Mild chronic active inflammation and repair.               B. No goblet cell metaplasia, dysplasia nor malignancy.               C. An H pylori immunohistochemical stain will be performed and will be resulted in an addendum.  3. Colon, Ascending, Biopsy:                 A. Fragments of invasive moderately differentiated adenocarcinoma.  4. Colon, Transverse, Biopsy:                 A. Fragments of tubular adenoma.  5. Colon, Rectum, Biopsy:                 A. Fragments of tubular adenoma.               B. Hyperplastic polyp.     RADIOLOGY:   • No recent imaging    LABS:    • 10/20/2022: Hemoglobin 9.0, hematocrit 33.2, MCV 75.5, iron 14, iron saturation 3, reticulocyte % 0.11, ferritin 9.10  • 8/27/2022: Hemoglobin 8.6, hematocrit 31.2, MCV 66.7  • 6/21/2022: Hemoglobin 7.3, hematocrit 28.0, MCV 62.1    PREVIOUS ABDOMINAL SURGERY    • N/A    OTHER SURGERY  · Open bilateral inguinal hernia repair (as infant)  • Laceration repair right thumb    PMH:    • Iron deficiency anemia, history of colon polyps, chronic cough, history of COVID-19, hypertension    ALLERGIES:   • None    MEDICATIONS:   • Amlodipine,  Benzonatate, Chlorcyclizine-pseudoephed, Ferrous sulfate, Fluticasone, DuoNeb, Meloxicam, Pantoprazole, Promethazine-dextromethorphan    FAMILY HISTORY:    • Colorectal cancer: Negative    SOCIAL HISTORY:   • Denies tobacco use  • Occasional alcohol use    PHYSICAL EXAM:   • Constitutional: Well-developed, well-nourished, no acute distress  • Height 188 cm, weight 146 kg, BMI 40.90  • Eyes: Conjunctiva normal, sclera nonicteric  • ENMT: Hearing grossly normal, oral mucosa moist  • Respiratory: Clear to auscultation, normal inspiratory effort  • Cardiovascular: Regular rate, no murmur, no peripheral edema, no jugular venous distention  • Gastrointestinal: Soft, nontender, no palpable mass, no hepatosplenomegaly, negative for hernia  • Skin: Warm, dry, no rash on visualized skin surfaces  • Musculoskeletal: Symmetric strength, normal gait  • Psychiatric: Alert and oriented ×3, normal affect     ROS:    Positive for cough, shortness of breath with exertion  All other systems reviewed and negative other than presenting complaints.    Shelia Ruggiero PA-C  General Surgery     [facility]  [address], [address]  [place] [address]  P: [phone]  F: [phone]

## 2022-11-07 NOTE — PROGRESS NOTES
SUMMARY (A/P):  57-year-old male with invasive moderately differentiated adenocarcinoma in the ascending colon. Dr. Tolentino and I reviewed his colonoscopy and EGD findings and pathology report.  A CT abdomen/pelvis was ordered but has not yet been completed. Order placed for this to be completed stat; our office will arrange for him to have this completed later today. Recommend patient proceed with laparoscopic right colon resection. Discussed the nature of the procedure, benefits and risks, including but not limited to bleeding, infection, converting to an open procedure, risk of anastomotic leak requiring reoperation and temporary colostomy.  Mechanical bowel prep instructions given and oral antibiotic preparation prescription sent to his pharmacy. Patient verbalized understanding and would like to proceed with surgery.  All questions were answered.  Dr. Tolentino is going to plan for surgery this coming Friday 11/11/2022.    CC: Colon cancer    HPI: Mr. Diaz is a pleasant 57-year-old male who presents today to discuss recent colonoscopy results of invasive adenocarcinoma in the ascending colon.  He reports in September 2021 he was diagnosed with COVID.  Since then he has had shortness of breath and a lingering cough causing fainting episodes. In June, he was still having significant coughing and shortness of breath and was evaluated further. He was diagnosed with iron deficiency anemia. He has since been on iron pills as well as undergoing iron infusions. Patient underwent EGD and colonoscopy for further work-up of iron deficiency anemia.  His upper endoscopy was unremarkable.  On colonoscopy, an ascending colon polyp returned as invasive adenocarcinoma. A transverse polyp and rectal polyp revealed tubular adenoma. Patient denies any blood in his stool recently.  He states he had an episode about a year ago where he did have rectal bleeding when wiping, no gross bleeding in the toilet.  He denies any nausea,  vomiting, constipation, or diarrhea. He states since his colonoscopy he has had mild abdominal pain but prior to this denies any symptoms.  He denies any family history of colon cancer.  He denies any prior abdominal surgeries.  He works in construction.    ENDOSCOPY:   • 11/3/2022 EGD, Dr. Oconnell: Normal esophagus, 1 cm hiatal hernia  • 11/3/2022 Colonoscopy, Dr. Oconnell: Rectal polyp, transverse colon polyp, ascending colon polyp, partially obstructing tumor in the proximal ascending colon  Final Diagnosis   1. Duodenum, Biopsy: Small bowel mucosa with             A. Normal intact villous surface.               B. No significant inflammation, no granulomas.               C: No viral inclusions or other organisms on routinely stained sections.  2. Stomach, Biopsy: Oxyntic type gastric mucosa with               A. Mild chronic active inflammation and repair.               B. No goblet cell metaplasia, dysplasia nor malignancy.               C. An H pylori immunohistochemical stain will be performed and will be resulted in an addendum.  3. Colon, Ascending, Biopsy:                 A. Fragments of invasive moderately differentiated adenocarcinoma.  4. Colon, Transverse, Biopsy:                 A. Fragments of tubular adenoma.  5. Colon, Rectum, Biopsy:                 A. Fragments of tubular adenoma.               B. Hyperplastic polyp.     RADIOLOGY:   • No recent imaging    LABS:    • 10/20/2022: Hemoglobin 9.0, hematocrit 33.2, MCV 75.5, iron 14, iron saturation 3, reticulocyte % 0.11, ferritin 9.10  • 8/27/2022: Hemoglobin 8.6, hematocrit 31.2, MCV 66.7  • 6/21/2022: Hemoglobin 7.3, hematocrit 28.0, MCV 62.1    PREVIOUS ABDOMINAL SURGERY    • N/A    OTHER SURGERY  · Open bilateral inguinal hernia repair (as infant)  • Laceration repair right thumb    PMH:    • Iron deficiency anemia, history of colon polyps, chronic cough, history of COVID-19, hypertension    ALLERGIES:   • None    MEDICATIONS:   • Amlodipine,  Benzonatate, Chlorcyclizine-pseudoephed, Ferrous sulfate, Fluticasone, DuoNeb, Meloxicam, Pantoprazole, Promethazine-dextromethorphan    FAMILY HISTORY:    • Colorectal cancer: Negative    SOCIAL HISTORY:   • Denies tobacco use  • Occasional alcohol use    PHYSICAL EXAM:   • Constitutional: Well-developed, well-nourished, no acute distress  • Height 188 cm, weight 146 kg, BMI 40.90  • Eyes: Conjunctiva normal, sclera nonicteric  • ENMT: Hearing grossly normal, oral mucosa moist  • Respiratory: Clear to auscultation, normal inspiratory effort  • Cardiovascular: Regular rate, no murmur, no peripheral edema, no jugular venous distention  • Gastrointestinal: Soft, nontender, no palpable mass, no hepatosplenomegaly, negative for hernia  • Skin: Warm, dry, no rash on visualized skin surfaces  • Musculoskeletal: Symmetric strength, normal gait  • Psychiatric: Alert and oriented ×3, normal affect     ROS:    Positive for cough, shortness of breath with exertion  All other systems reviewed and negative other than presenting complaints.    Shelia Ruggiero PA-C  General Surgery     [facility]  [address], [address]  [place] [address]  P: [phone]  F: [phone]

## 2022-11-08 ENCOUNTER — INFUSION (OUTPATIENT)
Dept: ONCOLOGY | Facility: HOSPITAL | Age: 57
End: 2022-11-08

## 2022-11-08 ENCOUNTER — TELEPHONE (OUTPATIENT)
Dept: SURGERY | Facility: CLINIC | Age: 57
End: 2022-11-08

## 2022-11-08 VITALS
WEIGHT: 315 LBS | HEART RATE: 81 BPM | OXYGEN SATURATION: 96 % | RESPIRATION RATE: 18 BRPM | HEIGHT: 74 IN | TEMPERATURE: 98.9 F | DIASTOLIC BLOOD PRESSURE: 90 MMHG | BODY MASS INDEX: 40.43 KG/M2 | SYSTOLIC BLOOD PRESSURE: 141 MMHG

## 2022-11-08 DIAGNOSIS — D50.0 IRON DEFICIENCY ANEMIA DUE TO CHRONIC BLOOD LOSS: Primary | ICD-10-CM

## 2022-11-08 PROCEDURE — 96365 THER/PROPH/DIAG IV INF INIT: CPT

## 2022-11-08 PROCEDURE — 63710000001 DIPHENHYDRAMINE PER 50 MG: Performed by: INTERNAL MEDICINE

## 2022-11-08 PROCEDURE — 25010000002 IRON SUCROSE PER 1 MG: Performed by: INTERNAL MEDICINE

## 2022-11-08 RX ORDER — DIPHENHYDRAMINE HCL 25 MG
25 CAPSULE ORAL ONCE
Status: COMPLETED | OUTPATIENT
Start: 2022-11-08 | End: 2022-11-08

## 2022-11-08 RX ORDER — SODIUM CHLORIDE 9 MG/ML
250 INJECTION, SOLUTION INTRAVENOUS ONCE
Status: COMPLETED | OUTPATIENT
Start: 2022-11-08 | End: 2022-11-08

## 2022-11-08 RX ORDER — ACETAMINOPHEN 325 MG/1
650 TABLET ORAL ONCE
Status: COMPLETED | OUTPATIENT
Start: 2022-11-08 | End: 2022-11-08

## 2022-11-08 RX ADMIN — ACETAMINOPHEN 650 MG: 325 TABLET ORAL at 10:16

## 2022-11-08 RX ADMIN — IRON SUCROSE 200 MG: 20 INJECTION, SOLUTION INTRAVENOUS at 10:43

## 2022-11-08 RX ADMIN — DIPHENHYDRAMINE HYDROCHLORIDE 25 MG: 25 CAPSULE ORAL at 10:16

## 2022-11-08 RX ADMIN — SODIUM CHLORIDE 250 ML: 9 INJECTION, SOLUTION INTRAVENOUS at 10:43

## 2022-11-08 NOTE — TELEPHONE ENCOUNTER
Reviewed recent CT results with Dr. Tolentino. Called and discussed results with patient, we can proceed with right colon resection as discussed yesterday at his appointment. His CT did reveal gallstones, discussed with patient we could remove his gallbladder at the same time of his colon surgery.  He is in agreement and would like to proceed with this as well.  Will update his consent form.    All questions were answered.    Shelia Ruggiero PA-C  General Surgery     [facility]  [address], [address]  [place] [address]  P: [phone]  F: [phone]

## 2022-11-10 ENCOUNTER — TELEPHONE (OUTPATIENT)
Dept: GASTROENTEROLOGY | Facility: CLINIC | Age: 57
End: 2022-11-10

## 2022-11-10 ENCOUNTER — ANESTHESIA EVENT (OUTPATIENT)
Dept: PERIOP | Facility: HOSPITAL | Age: 57
End: 2022-11-10

## 2022-11-10 NOTE — TELEPHONE ENCOUNTER
Patient called to inquire about  A colon resection tomorrow and not having received the antibiotics for it. After researching the chart this office is not doing the procedure. Called the patient back and gave them the name and number of the doctor listed for the resection.

## 2022-11-11 ENCOUNTER — ANESTHESIA (OUTPATIENT)
Dept: PERIOP | Facility: HOSPITAL | Age: 57
End: 2022-11-11

## 2022-11-11 ENCOUNTER — HOSPITAL ENCOUNTER (INPATIENT)
Facility: HOSPITAL | Age: 57
LOS: 2 days | Discharge: HOME | End: 2022-11-13
Attending: SURGERY | Admitting: SURGERY

## 2022-11-11 DIAGNOSIS — C18.2 CANCER OF ASCENDING COLON: ICD-10-CM

## 2022-11-11 LAB
ABO GROUP BLD: NORMAL
BLD GP AB SCN SERPL QL: NEGATIVE
CEA SERPL-MCNC: 3.47 NG/ML
RH BLD: POSITIVE
T&S EXPIRATION DATE: NORMAL

## 2022-11-11 PROCEDURE — 44205 LAP COLECTOMY PART W/ILEUM: CPT | Performed by: SURGERY

## 2022-11-11 PROCEDURE — 86901 BLOOD TYPING SEROLOGIC RH(D): CPT

## 2022-11-11 PROCEDURE — 82378 CARCINOEMBRYONIC ANTIGEN: CPT | Performed by: SURGERY

## 2022-11-11 PROCEDURE — 47562 LAPAROSCOPIC CHOLECYSTECTOMY: CPT | Performed by: SPECIALIST/TECHNOLOGIST, OTHER

## 2022-11-11 PROCEDURE — 25010000002 DEXAMETHASONE SODIUM PHOSPHATE 20 MG/5ML SOLUTION: Performed by: NURSE ANESTHETIST, CERTIFIED REGISTERED

## 2022-11-11 PROCEDURE — 86923 COMPATIBILITY TEST ELECTRIC: CPT

## 2022-11-11 PROCEDURE — 25010000002 FENTANYL CITRATE (PF) 50 MCG/ML SOLUTION: Performed by: NURSE ANESTHETIST, CERTIFIED REGISTERED

## 2022-11-11 PROCEDURE — 25010000002 HYDROMORPHONE PER 4 MG: Performed by: NURSE ANESTHETIST, CERTIFIED REGISTERED

## 2022-11-11 PROCEDURE — 86850 RBC ANTIBODY SCREEN: CPT

## 2022-11-11 PROCEDURE — 25010000002 ONDANSETRON PER 1 MG: Performed by: NURSE ANESTHETIST, CERTIFIED REGISTERED

## 2022-11-11 PROCEDURE — 25010000002 KETOROLAC TROMETHAMINE PER 15 MG: Performed by: SURGERY

## 2022-11-11 PROCEDURE — 47562 LAPAROSCOPIC CHOLECYSTECTOMY: CPT | Performed by: SURGERY

## 2022-11-11 PROCEDURE — 25010000002 CEFAZOLIN PER 500 MG: Performed by: SURGERY

## 2022-11-11 PROCEDURE — 81301 MICROSATELLITE INSTABILITY: CPT

## 2022-11-11 PROCEDURE — 0DTF4ZZ RESECTION OF RIGHT LARGE INTESTINE, PERCUTANEOUS ENDOSCOPIC APPROACH: ICD-10-PCS | Performed by: SURGERY

## 2022-11-11 PROCEDURE — 88323 CONSLTJ&REPRT MATRL PREP SLD: CPT

## 2022-11-11 PROCEDURE — 25010000002 HYDROMORPHONE 1 MG/ML SOLUTION: Performed by: NURSE ANESTHETIST, CERTIFIED REGISTERED

## 2022-11-11 PROCEDURE — 88309 TISSUE EXAM BY PATHOLOGIST: CPT | Performed by: SURGERY

## 2022-11-11 PROCEDURE — 88304 TISSUE EXAM BY PATHOLOGIST: CPT | Performed by: SURGERY

## 2022-11-11 PROCEDURE — 0FT44ZZ RESECTION OF GALLBLADDER, PERCUTANEOUS ENDOSCOPIC APPROACH: ICD-10-PCS | Performed by: SURGERY

## 2022-11-11 PROCEDURE — 86920 COMPATIBILITY TEST SPIN: CPT

## 2022-11-11 PROCEDURE — 25010000002 MIDAZOLAM PER 1 MG: Performed by: ANESTHESIOLOGY

## 2022-11-11 PROCEDURE — 88341 IMHCHEM/IMCYTCHM EA ADD ANTB: CPT

## 2022-11-11 PROCEDURE — 25010000002 PROPOFOL 10 MG/ML EMULSION: Performed by: NURSE ANESTHETIST, CERTIFIED REGISTERED

## 2022-11-11 PROCEDURE — 88368 INSITU HYBRIDIZATION MANUAL: CPT

## 2022-11-11 PROCEDURE — 88360 TUMOR IMMUNOHISTOCHEM/MANUAL: CPT

## 2022-11-11 PROCEDURE — 86900 BLOOD TYPING SEROLOGIC ABO: CPT

## 2022-11-11 PROCEDURE — 88369 M/PHMTRC ALYSISHQUANT/SEMIQ: CPT

## 2022-11-11 PROCEDURE — 25010000002 DROPERIDOL PER 5 MG: Performed by: ANESTHESIOLOGY

## 2022-11-11 PROCEDURE — 88365 INSITU HYBRIDIZATION (FISH): CPT

## 2022-11-11 PROCEDURE — 88342 IMHCHEM/IMCYTCHM 1ST ANTB: CPT

## 2022-11-11 PROCEDURE — 44205 LAP COLECTOMY PART W/ILEUM: CPT | Performed by: SPECIALIST/TECHNOLOGIST, OTHER

## 2022-11-11 DEVICE — HORIZON TI ML 6 CLIPS/CART
Type: IMPLANTABLE DEVICE | Site: ABDOMEN | Status: FUNCTIONAL
Brand: WECK

## 2022-11-11 DEVICE — HORIZON TI LG 6 CLIPS/CART
Type: IMPLANTABLE DEVICE | Site: ABDOMEN | Status: FUNCTIONAL
Brand: WECK

## 2022-11-11 DEVICE — ENDOPATH ECHELON ENDOSCOPIC LINEAR CUTTER RELOADS, WHITE, 60MM
Type: IMPLANTABLE DEVICE | Site: ABDOMEN | Status: FUNCTIONAL
Brand: ECHELON ENDOPATH

## 2022-11-11 DEVICE — ENDOSCOPIC LINEAR CUTTER RELOADS GRAY 2.0MM, 6 ROWS
Type: IMPLANTABLE DEVICE | Site: ABDOMEN | Status: FUNCTIONAL
Brand: ECHELON ENDOPATH

## 2022-11-11 DEVICE — ENDOPATH ECHELON ENDOSCOPIC LINEAR CUTTER RELOADS, BLUE, 60MM
Type: IMPLANTABLE DEVICE | Site: ABDOMEN | Status: FUNCTIONAL
Brand: ECHELON ENDOPATH

## 2022-11-11 RX ORDER — DIPHENHYDRAMINE HCL 25 MG
25 CAPSULE ORAL
Status: DISCONTINUED | OUTPATIENT
Start: 2022-11-11 | End: 2022-11-11 | Stop reason: HOSPADM

## 2022-11-11 RX ORDER — CEFAZOLIN SODIUM 2 G/100ML
2 INJECTION, SOLUTION INTRAVENOUS ONCE
Status: DISCONTINUED | OUTPATIENT
Start: 2022-11-11 | End: 2022-11-11

## 2022-11-11 RX ORDER — METRONIDAZOLE 500 MG/100ML
500 INJECTION, SOLUTION INTRAVENOUS ONCE
Status: COMPLETED | OUTPATIENT
Start: 2022-11-11 | End: 2022-11-11

## 2022-11-11 RX ORDER — DROPERIDOL 2.5 MG/ML
0.62 INJECTION, SOLUTION INTRAMUSCULAR; INTRAVENOUS ONCE AS NEEDED
Status: COMPLETED | OUTPATIENT
Start: 2022-11-11 | End: 2022-11-11

## 2022-11-11 RX ORDER — PROMETHAZINE HYDROCHLORIDE 25 MG/1
25 TABLET ORAL ONCE AS NEEDED
Status: DISCONTINUED | OUTPATIENT
Start: 2022-11-11 | End: 2022-11-11 | Stop reason: HOSPADM

## 2022-11-11 RX ORDER — HYDROCODONE BITARTRATE AND ACETAMINOPHEN 7.5; 325 MG/1; MG/1
1 TABLET ORAL ONCE AS NEEDED
Status: DISCONTINUED | OUTPATIENT
Start: 2022-11-11 | End: 2022-11-11 | Stop reason: HOSPADM

## 2022-11-11 RX ORDER — NALOXONE HCL 0.4 MG/ML
0.1 VIAL (ML) INJECTION
Status: DISCONTINUED | OUTPATIENT
Start: 2022-11-11 | End: 2022-11-13 | Stop reason: HOSPADM

## 2022-11-11 RX ORDER — DEXAMETHASONE SODIUM PHOSPHATE 4 MG/ML
INJECTION, SOLUTION INTRA-ARTICULAR; INTRALESIONAL; INTRAMUSCULAR; INTRAVENOUS; SOFT TISSUE AS NEEDED
Status: DISCONTINUED | OUTPATIENT
Start: 2022-11-11 | End: 2022-11-11 | Stop reason: SURG

## 2022-11-11 RX ORDER — DIPHENHYDRAMINE HYDROCHLORIDE 50 MG/ML
12.5 INJECTION INTRAMUSCULAR; INTRAVENOUS
Status: DISCONTINUED | OUTPATIENT
Start: 2022-11-11 | End: 2022-11-11 | Stop reason: HOSPADM

## 2022-11-11 RX ORDER — SODIUM CHLORIDE 0.9 % (FLUSH) 0.9 %
3-10 SYRINGE (ML) INJECTION AS NEEDED
Status: DISCONTINUED | OUTPATIENT
Start: 2022-11-11 | End: 2022-11-11 | Stop reason: HOSPADM

## 2022-11-11 RX ORDER — FLUMAZENIL 0.1 MG/ML
0.2 INJECTION INTRAVENOUS AS NEEDED
Status: DISCONTINUED | OUTPATIENT
Start: 2022-11-11 | End: 2022-11-11 | Stop reason: HOSPADM

## 2022-11-11 RX ORDER — NALOXONE HCL 0.4 MG/ML
0.2 VIAL (ML) INJECTION AS NEEDED
Status: DISCONTINUED | OUTPATIENT
Start: 2022-11-11 | End: 2022-11-11 | Stop reason: HOSPADM

## 2022-11-11 RX ORDER — FENTANYL CITRATE 0.05 MG/ML
INJECTION, SOLUTION INTRAMUSCULAR; INTRAVENOUS AS NEEDED
Status: DISCONTINUED | OUTPATIENT
Start: 2022-11-11 | End: 2022-11-11 | Stop reason: SURG

## 2022-11-11 RX ORDER — HYDROCODONE BITARTRATE AND ACETAMINOPHEN 5; 325 MG/1; MG/1
2 TABLET ORAL EVERY 4 HOURS PRN
Status: DISCONTINUED | OUTPATIENT
Start: 2022-11-11 | End: 2022-11-13 | Stop reason: HOSPADM

## 2022-11-11 RX ORDER — BUPIVACAINE HCL/0.9 % NACL/PF 0.125 %
PLASTIC BAG, INJECTION (ML) EPIDURAL AS NEEDED
Status: DISCONTINUED | OUTPATIENT
Start: 2022-11-11 | End: 2022-11-11 | Stop reason: SURG

## 2022-11-11 RX ORDER — OXYCODONE AND ACETAMINOPHEN 7.5; 325 MG/1; MG/1
1 TABLET ORAL EVERY 4 HOURS PRN
Status: DISCONTINUED | OUTPATIENT
Start: 2022-11-11 | End: 2022-11-11 | Stop reason: HOSPADM

## 2022-11-11 RX ORDER — HYDROCODONE BITARTRATE AND ACETAMINOPHEN 5; 325 MG/1; MG/1
1 TABLET ORAL EVERY 4 HOURS PRN
Status: DISCONTINUED | OUTPATIENT
Start: 2022-11-11 | End: 2022-11-13 | Stop reason: HOSPADM

## 2022-11-11 RX ORDER — LIDOCAINE HYDROCHLORIDE 20 MG/ML
INJECTION, SOLUTION EPIDURAL; INFILTRATION; INTRACAUDAL; PERINEURAL AS NEEDED
Status: DISCONTINUED | OUTPATIENT
Start: 2022-11-11 | End: 2022-11-11 | Stop reason: SURG

## 2022-11-11 RX ORDER — PROMETHAZINE HYDROCHLORIDE 25 MG/1
25 SUPPOSITORY RECTAL ONCE AS NEEDED
Status: DISCONTINUED | OUTPATIENT
Start: 2022-11-11 | End: 2022-11-11 | Stop reason: HOSPADM

## 2022-11-11 RX ORDER — HYDROMORPHONE HYDROCHLORIDE 1 MG/ML
0.5 INJECTION, SOLUTION INTRAMUSCULAR; INTRAVENOUS; SUBCUTANEOUS
Status: DISCONTINUED | OUTPATIENT
Start: 2022-11-11 | End: 2022-11-11 | Stop reason: HOSPADM

## 2022-11-11 RX ORDER — ALVIMOPAN 12 MG/1
12 CAPSULE ORAL ONCE
Status: COMPLETED | OUTPATIENT
Start: 2022-11-11 | End: 2022-11-11

## 2022-11-11 RX ORDER — KETAMINE HCL IN NACL, ISO-OSM 100MG/10ML
SYRINGE (ML) INJECTION AS NEEDED
Status: DISCONTINUED | OUTPATIENT
Start: 2022-11-11 | End: 2022-11-11 | Stop reason: SURG

## 2022-11-11 RX ORDER — LABETALOL HYDROCHLORIDE 5 MG/ML
5 INJECTION, SOLUTION INTRAVENOUS
Status: DISCONTINUED | OUTPATIENT
Start: 2022-11-11 | End: 2022-11-11 | Stop reason: HOSPADM

## 2022-11-11 RX ORDER — HYDROMORPHONE HYDROCHLORIDE 1 MG/ML
0.5 INJECTION, SOLUTION INTRAMUSCULAR; INTRAVENOUS; SUBCUTANEOUS
Status: DISCONTINUED | OUTPATIENT
Start: 2022-11-11 | End: 2022-11-13 | Stop reason: HOSPADM

## 2022-11-11 RX ORDER — LIDOCAINE HYDROCHLORIDE 10 MG/ML
0.5 INJECTION, SOLUTION EPIDURAL; INFILTRATION; INTRACAUDAL; PERINEURAL ONCE AS NEEDED
Status: DISCONTINUED | OUTPATIENT
Start: 2022-11-11 | End: 2022-11-11 | Stop reason: HOSPADM

## 2022-11-11 RX ORDER — KETOROLAC TROMETHAMINE 30 MG/ML
15 INJECTION, SOLUTION INTRAMUSCULAR; INTRAVENOUS EVERY 6 HOURS
Status: DISCONTINUED | OUTPATIENT
Start: 2022-11-11 | End: 2022-11-13 | Stop reason: HOSPADM

## 2022-11-11 RX ORDER — FENTANYL CITRATE 50 UG/ML
50 INJECTION, SOLUTION INTRAMUSCULAR; INTRAVENOUS
Status: DISCONTINUED | OUTPATIENT
Start: 2022-11-11 | End: 2022-11-11 | Stop reason: HOSPADM

## 2022-11-11 RX ORDER — BUPIVACAINE HYDROCHLORIDE AND EPINEPHRINE 5; 5 MG/ML; UG/ML
INJECTION, SOLUTION EPIDURAL; INTRACAUDAL; PERINEURAL AS NEEDED
Status: DISCONTINUED | OUTPATIENT
Start: 2022-11-11 | End: 2022-11-11 | Stop reason: HOSPADM

## 2022-11-11 RX ORDER — PROPOFOL 10 MG/ML
VIAL (ML) INTRAVENOUS AS NEEDED
Status: DISCONTINUED | OUTPATIENT
Start: 2022-11-11 | End: 2022-11-11 | Stop reason: SURG

## 2022-11-11 RX ORDER — MIDAZOLAM HYDROCHLORIDE 1 MG/ML
1 INJECTION INTRAMUSCULAR; INTRAVENOUS
Status: COMPLETED | OUTPATIENT
Start: 2022-11-11 | End: 2022-11-11

## 2022-11-11 RX ORDER — SODIUM CHLORIDE, SODIUM LACTATE, POTASSIUM CHLORIDE, CALCIUM CHLORIDE 600; 310; 30; 20 MG/100ML; MG/100ML; MG/100ML; MG/100ML
9 INJECTION, SOLUTION INTRAVENOUS CONTINUOUS
Status: DISCONTINUED | OUTPATIENT
Start: 2022-11-11 | End: 2022-11-12

## 2022-11-11 RX ORDER — ONDANSETRON 2 MG/ML
4 INJECTION INTRAMUSCULAR; INTRAVENOUS ONCE AS NEEDED
Status: COMPLETED | OUTPATIENT
Start: 2022-11-11 | End: 2022-11-11

## 2022-11-11 RX ORDER — HYDRALAZINE HYDROCHLORIDE 20 MG/ML
5 INJECTION INTRAMUSCULAR; INTRAVENOUS
Status: DISCONTINUED | OUTPATIENT
Start: 2022-11-11 | End: 2022-11-11 | Stop reason: HOSPADM

## 2022-11-11 RX ORDER — SODIUM CHLORIDE 9 MG/ML
100 INJECTION, SOLUTION INTRAVENOUS CONTINUOUS
Status: DISCONTINUED | OUTPATIENT
Start: 2022-11-11 | End: 2022-11-12

## 2022-11-11 RX ORDER — CEFAZOLIN SODIUM IN 0.9 % NACL 3 G/100 ML
3 INTRAVENOUS SOLUTION, PIGGYBACK (ML) INTRAVENOUS ONCE
Status: COMPLETED | OUTPATIENT
Start: 2022-11-11 | End: 2022-11-11

## 2022-11-11 RX ORDER — EPHEDRINE SULFATE 50 MG/ML
5 INJECTION, SOLUTION INTRAVENOUS ONCE AS NEEDED
Status: DISCONTINUED | OUTPATIENT
Start: 2022-11-11 | End: 2022-11-11 | Stop reason: HOSPADM

## 2022-11-11 RX ORDER — ROCURONIUM BROMIDE 10 MG/ML
INJECTION, SOLUTION INTRAVENOUS AS NEEDED
Status: DISCONTINUED | OUTPATIENT
Start: 2022-11-11 | End: 2022-11-11 | Stop reason: SURG

## 2022-11-11 RX ORDER — FAMOTIDINE 10 MG/ML
20 INJECTION, SOLUTION INTRAVENOUS ONCE
Status: COMPLETED | OUTPATIENT
Start: 2022-11-11 | End: 2022-11-11

## 2022-11-11 RX ORDER — SODIUM CHLORIDE 9 MG/ML
INJECTION, SOLUTION INTRAVENOUS AS NEEDED
Status: DISCONTINUED | OUTPATIENT
Start: 2022-11-11 | End: 2022-11-11 | Stop reason: HOSPADM

## 2022-11-11 RX ORDER — SODIUM CHLORIDE 0.9 % (FLUSH) 0.9 %
3 SYRINGE (ML) INJECTION EVERY 12 HOURS SCHEDULED
Status: DISCONTINUED | OUTPATIENT
Start: 2022-11-11 | End: 2022-11-11 | Stop reason: HOSPADM

## 2022-11-11 RX ORDER — ONDANSETRON 2 MG/ML
INJECTION INTRAMUSCULAR; INTRAVENOUS AS NEEDED
Status: DISCONTINUED | OUTPATIENT
Start: 2022-11-11 | End: 2022-11-11 | Stop reason: SURG

## 2022-11-11 RX ORDER — MAGNESIUM HYDROXIDE 1200 MG/15ML
LIQUID ORAL AS NEEDED
Status: DISCONTINUED | OUTPATIENT
Start: 2022-11-11 | End: 2022-11-11 | Stop reason: HOSPADM

## 2022-11-11 RX ADMIN — ONDANSETRON 4 MG: 2 INJECTION INTRAMUSCULAR; INTRAVENOUS at 16:47

## 2022-11-11 RX ADMIN — FENTANYL CITRATE 50 MCG: 50 INJECTION INTRAMUSCULAR; INTRAVENOUS at 18:12

## 2022-11-11 RX ADMIN — DEXAMETHASONE SODIUM PHOSPHATE 8 MG: 4 INJECTION, SOLUTION INTRAMUSCULAR; INTRAVENOUS at 14:39

## 2022-11-11 RX ADMIN — ROCURONIUM BROMIDE 20 MG: 10 INJECTION, SOLUTION INTRAVENOUS at 16:42

## 2022-11-11 RX ADMIN — FENTANYL CITRATE 50 MCG: 50 INJECTION INTRAMUSCULAR; INTRAVENOUS at 17:45

## 2022-11-11 RX ADMIN — PROPOFOL 200 MG: 10 INJECTION, EMULSION INTRAVENOUS at 14:32

## 2022-11-11 RX ADMIN — ALVIMOPAN 12 MG: 12 CAPSULE ORAL at 13:49

## 2022-11-11 RX ADMIN — HYDROMORPHONE HYDROCHLORIDE 0.5 MG: 1 INJECTION, SOLUTION INTRAMUSCULAR; INTRAVENOUS; SUBCUTANEOUS at 16:49

## 2022-11-11 RX ADMIN — HYDROMORPHONE HYDROCHLORIDE 0.5 MG: 1 INJECTION, SOLUTION INTRAMUSCULAR; INTRAVENOUS; SUBCUTANEOUS at 18:32

## 2022-11-11 RX ADMIN — ROCURONIUM BROMIDE 50 MG: 10 INJECTION, SOLUTION INTRAVENOUS at 14:32

## 2022-11-11 RX ADMIN — FENTANYL CITRATE 100 MCG: 50 INJECTION INTRAMUSCULAR; INTRAVENOUS at 14:29

## 2022-11-11 RX ADMIN — HYDROMORPHONE HYDROCHLORIDE 0.5 MG: 1 INJECTION, SOLUTION INTRAMUSCULAR; INTRAVENOUS; SUBCUTANEOUS at 19:01

## 2022-11-11 RX ADMIN — DROPERIDOL 0.62 MG: 2.5 INJECTION, SOLUTION INTRAMUSCULAR; INTRAVENOUS at 18:46

## 2022-11-11 RX ADMIN — METRONIDAZOLE 500 MG: 500 INJECTION, SOLUTION INTRAVENOUS at 14:18

## 2022-11-11 RX ADMIN — KETOROLAC TROMETHAMINE 15 MG: 30 INJECTION, SOLUTION INTRAMUSCULAR; INTRAVENOUS at 23:58

## 2022-11-11 RX ADMIN — SODIUM CHLORIDE, POTASSIUM CHLORIDE, SODIUM LACTATE AND CALCIUM CHLORIDE 9 ML/HR: 600; 310; 30; 20 INJECTION, SOLUTION INTRAVENOUS at 12:20

## 2022-11-11 RX ADMIN — Medication 25 MG: at 15:49

## 2022-11-11 RX ADMIN — MIDAZOLAM 1 MG: 1 INJECTION INTRAMUSCULAR; INTRAVENOUS at 13:56

## 2022-11-11 RX ADMIN — KETOROLAC TROMETHAMINE 15 MG: 30 INJECTION, SOLUTION INTRAMUSCULAR; INTRAVENOUS at 18:10

## 2022-11-11 RX ADMIN — Medication 200 MCG: at 16:04

## 2022-11-11 RX ADMIN — Medication 100 MCG: at 14:46

## 2022-11-11 RX ADMIN — ROCURONIUM BROMIDE 10 MG: 10 INJECTION, SOLUTION INTRAVENOUS at 15:00

## 2022-11-11 RX ADMIN — SUGAMMADEX 200 MG: 100 INJECTION, SOLUTION INTRAVENOUS at 17:17

## 2022-11-11 RX ADMIN — ROCURONIUM BROMIDE 20 MG: 10 INJECTION, SOLUTION INTRAVENOUS at 15:25

## 2022-11-11 RX ADMIN — HYDROMORPHONE HYDROCHLORIDE 0.5 MG: 1 INJECTION, SOLUTION INTRAMUSCULAR; INTRAVENOUS; SUBCUTANEOUS at 17:04

## 2022-11-11 RX ADMIN — HYDROMORPHONE HYDROCHLORIDE 0.5 MG: 1 INJECTION, SOLUTION INTRAMUSCULAR; INTRAVENOUS; SUBCUTANEOUS at 17:54

## 2022-11-11 RX ADMIN — CEFAZOLIN 3 G: 10 INJECTION, POWDER, FOR SOLUTION INTRAVENOUS at 14:18

## 2022-11-11 RX ADMIN — Medication 100 MCG: at 14:57

## 2022-11-11 RX ADMIN — Medication 25 MG: at 16:28

## 2022-11-11 RX ADMIN — ONDANSETRON 4 MG: 2 INJECTION INTRAMUSCULAR; INTRAVENOUS at 17:46

## 2022-11-11 RX ADMIN — MIDAZOLAM 1 MG: 1 INJECTION INTRAMUSCULAR; INTRAVENOUS at 13:43

## 2022-11-11 RX ADMIN — HYDROCODONE BITARTRATE AND ACETAMINOPHEN 2 TABLET: 5; 325 TABLET ORAL at 21:12

## 2022-11-11 RX ADMIN — LIDOCAINE HYDROCHLORIDE 100 MG: 20 INJECTION, SOLUTION EPIDURAL; INFILTRATION; INTRACAUDAL; PERINEURAL at 14:32

## 2022-11-11 RX ADMIN — SODIUM CHLORIDE, POTASSIUM CHLORIDE, SODIUM LACTATE AND CALCIUM CHLORIDE: 600; 310; 30; 20 INJECTION, SOLUTION INTRAVENOUS at 15:15

## 2022-11-11 RX ADMIN — FAMOTIDINE 20 MG: 10 INJECTION INTRAVENOUS at 13:42

## 2022-11-11 RX ADMIN — SODIUM CHLORIDE 100 ML/HR: 9 INJECTION, SOLUTION INTRAVENOUS at 18:19

## 2022-11-11 NOTE — ANESTHESIA POSTPROCEDURE EVALUATION
"Patient: Damián Diaz    Procedure Summary     Date: 11/11/22 Room / Location: [facility] OR [address] [place] [address] MAIN OR    Anesthesia Start: 1425 Anesthesia Stop: 1728    Procedures:       COLON RESECTION RIGHT (Right: Abdomen)      LAPAROSCOPIC CHOLECYSTECTOMY (Abdomen) Diagnosis:       Cancer of ascending colon (HCC)      (Cancer of ascending colon (HCC) [C18.2])    Surgeons: Nigel Tolentino MD Provider: Ko Peace MD    Anesthesia Type: general ASA Status: 3          Anesthesia Type: general    Vitals  Vitals Value Taken Time   /101 11/11/22 1846   Temp 37.1 °C (98.7 °F) 11/11/22 1725   Pulse 93 11/11/22 1856   Resp 20 11/11/22 1845   SpO2 96 % 11/11/22 1856   Vitals shown include unvalidated device data.        Post Anesthesia Care and Evaluation    Patient location during evaluation: bedside  Patient participation: complete - patient participated  Level of consciousness: awake  Pain management: adequate    Airway patency: patent  Anesthetic complications: No anesthetic complications    Cardiovascular status: acceptable  Respiratory status: acceptable  Hydration status: acceptable    Comments: *BP (!) 157/101   Pulse 91   Temp 37.1 °C (98.7 °F) (Oral)   Resp 20   Ht 188 cm (74\")   Wt (!) 144 kg (316 lb 12.8 oz)   SpO2 97%   BMI 40.67 kg/m²         "

## 2022-11-11 NOTE — ANESTHESIA PROCEDURE NOTES
Airway  Urgency: elective    Date/Time: 11/11/2022 2:35 PM  Airway not difficult    General Information and Staff    Patient location during procedure: OR  Anesthesiologist: Paramjit Spicer MD  CRNA/CAA: John Lanza CRNA    Indications and Patient Condition  Indications for airway management: airway protection    Preoxygenated: yes  MILS maintained throughout  Mask difficulty assessment: 2 - vent by mask + OA or adjuvant +/- NMBA    Final Airway Details  Final airway type: endotracheal airway      Successful airway: ETT  Cuffed: yes   Successful intubation technique: direct laryngoscopy  Facilitating devices/methods: intubating stylet  Endotracheal tube insertion site: oral  Blade: Herring  Blade size: 2  ETT size (mm): 7.5  Cormack-Lehane Classification: grade I - full view of glottis  Placement verified by: chest auscultation and capnometry   Cuff volume (mL): 8  Measured from: lips  ETT/EBT  to lips (cm): 22  Number of attempts at approach: 1  Assessment: lips, teeth, and gum same as pre-op and atraumatic intubation

## 2022-11-11 NOTE — OP NOTE
PREOPERATIVE DIAGNOSIS:  · Right colon cancer  · Gallstones    POSTOPERATIVE DIAGNOSIS (FINDINGS):  Same    PROCEDURE:  · Laparoscopic right colectomy  · Laparoscopic cholecystectomy    SURGEON:  Nigel Tolentino MD    ASSISTANT:  Mark Anthony Babcock was responsible for performing the following activities: suction, irrigation, suturing, closing, retraction, camera holding, and placing dressing, and their skilled assistance was necessary for the success of this case.    ANESTHESIA:  General    EBL:  Minimal    SPECIMEN(S):  · Right colon  · Gallbladder    DESCRIPTION:  In supine position under general anesthetic prepped and draped usual sterile manner.  Small incision made above the umbilicus and Veress needle inserted with upper extraction on the abdominal wall.  Abdomen insufflated 15 mmHg.  All incision sites were infiltrated both in the subcutaneous and preperitoneal planes with half percent Marcaine with epinephrine mixed with Exparel.  Subxiphoid 12 mm trocar site, right subcostal 5 mm trocar, right lower abdomen 5 mm trocar, and suprapubic 5 mm trochars introduced.  Gallbladder grasped and elevated and cystic duct dissected, clipped twice proximally, once distally, divided.  Cystic artery clipped once distally, twice proximally and divided.  Gallbladder removed from liver bed with electrocautery and placed in an Endo Catch bag and later on removed through the colon extraction incision.  The hepatic flexure was completely mobilized with the harmonic scalpel.  The ileocolic pedicle was isolated and divided with a vascular loaded stapler.  The terminal ileum and ascending colon were completely mobilized with the harmonic scalpel and duodenum was swept posteriorly and attachments divided with the harmonic scalpel.  The remaining terminal ileum mesentery was divided with the harmonic scalpel and the terminal ileum was divided with a white loaded stapler.  The transverse colon mesentery was divided with the harmonic  scalpel using clips for larger mesenteric branches and the transverse colon itself was divided with the blue loaded Boissevain stapler.  Good hemostasis was insured.  The midepigastric 12 mm trocar incision and right upper quadrant 5 mm trocar incisions were connected, fascia opened, and medium wound protector inserted.  The specimen was withdrawn through the medium wound protector as was the gallbladder.  The end of the colon was anastomosed to the side of the ileum with 2 layer outer interrupted 2-0 silk and running interlocking 3-0 chromic anastomosis.  Staple line on the ileum was oversewn with 2-0 silk.  Fascia was closed in 2 layers of running 0 PDS.  Abdomen was reinsufflated and good hemostasis was noted.  Anastomosis was located within the right upper quadrant and appropriately oriented with no twisting of the mesentery and no internal hernia.  CO2 was released.  Skin edges were closed with 5-0 Vicryl subcuticular followed by Exofin.  Tolerated well.    Nigel Tolentino M.D.

## 2022-11-11 NOTE — ANESTHESIA PREPROCEDURE EVALUATION
Anesthesia Evaluation     Patient summary reviewed and Nursing notes reviewed   no history of anesthetic complications:  NPO Solid Status: > 8 hours  NPO Liquid Status: > 2 hours           Airway   Mallampati: II  TM distance: >3 FB  Neck ROM: full  Possible difficult intubation and Large neck circumference  Dental - normal exam     Pulmonary - negative pulmonary ROS and normal exam   (-) COPD, asthma, not a smoker, lung cancer  Cardiovascular - normal exam  Exercise tolerance: good (4-7 METS)    ECG reviewed  Rhythm: regular  Rate: normal    (+) hypertension well controlled less than 2 medications,   (-) valvular problems/murmurs, past MI, CAD, dysrhythmias, angina, CHF, cardiac stents, CABG, pericardial effusion      Neuro/Psych- negative ROS  (-) seizures, TIA, CVA  GI/Hepatic/Renal/Endo    (+) obesity,     (-) hiatal hernia, GERD, PUD, hepatitis, liver disease, no renal disease, diabetes, GI bleed, no thyroid disorder    Musculoskeletal (-) negative ROS    Abdominal   (+) obese,     Abdomen: soft.   Substance History - negative use     OB/GYN          Other      history of cancer active      Other Comment: Colon CA                  Anesthesia Plan    ASA 3     general     intravenous induction     Anesthetic plan, risks, benefits, and alternatives have been provided, discussed and informed consent has been obtained with: patient.    Plan discussed with CRNA.        CODE STATUS:

## 2022-11-12 LAB
ANION GAP SERPL CALCULATED.3IONS-SCNC: 10 MMOL/L (ref 5–15)
BASOPHILS # BLD AUTO: 0.01 10*3/MM3 (ref 0–0.2)
BASOPHILS NFR BLD AUTO: 0.1 % (ref 0–1.5)
BUN SERPL-MCNC: 10 MG/DL (ref 6–20)
BUN/CREAT SERPL: 12.2 (ref 7–25)
CALCIUM SPEC-SCNC: 8.6 MG/DL (ref 8.6–10.5)
CHLORIDE SERPL-SCNC: 101 MMOL/L (ref 98–107)
CO2 SERPL-SCNC: 24 MMOL/L (ref 22–29)
CREAT SERPL-MCNC: 0.82 MG/DL (ref 0.76–1.27)
DEPRECATED RDW RBC AUTO: 44.1 FL (ref 37–54)
EGFRCR SERPLBLD CKD-EPI 2021: 103.7 ML/MIN/1.73
EOSINOPHIL # BLD AUTO: 0 10*3/MM3 (ref 0–0.4)
EOSINOPHIL NFR BLD AUTO: 0 % (ref 0.3–6.2)
ERYTHROCYTE [DISTWIDTH] IN BLOOD BY AUTOMATED COUNT: 18.1 % (ref 12.3–15.4)
GLUCOSE SERPL-MCNC: 120 MG/DL (ref 65–99)
HCT VFR BLD AUTO: 28.4 % (ref 37.5–51)
HGB BLD-MCNC: 8.2 G/DL (ref 13–17.7)
LYMPHOCYTES # BLD AUTO: 1.71 10*3/MM3 (ref 0.7–3.1)
LYMPHOCYTES NFR BLD AUTO: 12.6 % (ref 19.6–45.3)
MCH RBC QN AUTO: 20.1 PG (ref 26.6–33)
MCHC RBC AUTO-ENTMCNC: 28.9 G/DL (ref 31.5–35.7)
MCV RBC AUTO: 69.8 FL (ref 79–97)
MONOCYTES # BLD AUTO: 1.03 10*3/MM3 (ref 0.1–0.9)
MONOCYTES NFR BLD AUTO: 7.6 % (ref 5–12)
NEUTROPHILS NFR BLD AUTO: 10.69 10*3/MM3 (ref 1.7–7)
NEUTROPHILS NFR BLD AUTO: 79 % (ref 42.7–76)
PLATELET # BLD AUTO: 321 10*3/MM3 (ref 140–450)
PMV BLD AUTO: 9 FL (ref 6–12)
POTASSIUM SERPL-SCNC: 3.8 MMOL/L (ref 3.5–5.2)
RBC # BLD AUTO: 4.07 10*6/MM3 (ref 4.14–5.8)
SODIUM SERPL-SCNC: 135 MMOL/L (ref 136–145)
WBC NRBC COR # BLD: 13.53 10*3/MM3 (ref 3.4–10.8)

## 2022-11-12 PROCEDURE — 25010000002 HYDROMORPHONE PER 4 MG: Performed by: SURGERY

## 2022-11-12 PROCEDURE — 86900 BLOOD TYPING SEROLOGIC ABO: CPT

## 2022-11-12 PROCEDURE — 80048 BASIC METABOLIC PNL TOTAL CA: CPT | Performed by: SURGERY

## 2022-11-12 PROCEDURE — 25010000002 KETOROLAC TROMETHAMINE PER 15 MG: Performed by: SURGERY

## 2022-11-12 PROCEDURE — 85025 COMPLETE CBC W/AUTO DIFF WBC: CPT | Performed by: SURGERY

## 2022-11-12 PROCEDURE — 99024 POSTOP FOLLOW-UP VISIT: CPT | Performed by: SURGERY

## 2022-11-12 PROCEDURE — 86901 BLOOD TYPING SEROLOGIC RH(D): CPT

## 2022-11-12 PROCEDURE — 25010000002 CEFAZOLIN PER 500 MG: Performed by: SURGERY

## 2022-11-12 PROCEDURE — 25010000002 ENOXAPARIN PER 10 MG: Performed by: SURGERY

## 2022-11-12 RX ORDER — AMLODIPINE BESYLATE 5 MG/1
5 TABLET ORAL DAILY
Status: DISCONTINUED | OUTPATIENT
Start: 2022-11-12 | End: 2022-11-13 | Stop reason: HOSPADM

## 2022-11-12 RX ORDER — SIMETHICONE 80 MG
80 TABLET,CHEWABLE ORAL 4 TIMES DAILY PRN
Status: DISCONTINUED | OUTPATIENT
Start: 2022-11-12 | End: 2022-11-13 | Stop reason: HOSPADM

## 2022-11-12 RX ORDER — ENOXAPARIN SODIUM 100 MG/ML
40 INJECTION SUBCUTANEOUS DAILY
Status: DISCONTINUED | OUTPATIENT
Start: 2022-11-12 | End: 2022-11-13 | Stop reason: HOSPADM

## 2022-11-12 RX ORDER — CEFAZOLIN SODIUM IN 0.9 % NACL 3 G/100 ML
3 INTRAVENOUS SOLUTION, PIGGYBACK (ML) INTRAVENOUS EVERY 8 HOURS
Status: COMPLETED | OUTPATIENT
Start: 2022-11-12 | End: 2022-11-12

## 2022-11-12 RX ORDER — PANTOPRAZOLE SODIUM 40 MG/1
40 TABLET, DELAYED RELEASE ORAL DAILY
Status: DISCONTINUED | OUTPATIENT
Start: 2022-11-12 | End: 2022-11-13 | Stop reason: HOSPADM

## 2022-11-12 RX ORDER — METRONIDAZOLE 500 MG/100ML
500 INJECTION, SOLUTION INTRAVENOUS EVERY 8 HOURS SCHEDULED
Status: COMPLETED | OUTPATIENT
Start: 2022-11-12 | End: 2022-11-12

## 2022-11-12 RX ORDER — ONDANSETRON 2 MG/ML
4 INJECTION INTRAMUSCULAR; INTRAVENOUS EVERY 6 HOURS PRN
Status: DISCONTINUED | OUTPATIENT
Start: 2022-11-12 | End: 2022-11-13 | Stop reason: HOSPADM

## 2022-11-12 RX ADMIN — PANTOPRAZOLE SODIUM 40 MG: 40 TABLET, DELAYED RELEASE ORAL at 10:19

## 2022-11-12 RX ADMIN — SODIUM CHLORIDE 100 ML/HR: 9 INJECTION, SOLUTION INTRAVENOUS at 03:27

## 2022-11-12 RX ADMIN — SODIUM CHLORIDE 3 G: 9 INJECTION, SOLUTION INTRAVENOUS at 12:16

## 2022-11-12 RX ADMIN — KETOROLAC TROMETHAMINE 15 MG: 30 INJECTION, SOLUTION INTRAMUSCULAR; INTRAVENOUS at 12:06

## 2022-11-12 RX ADMIN — ENOXAPARIN SODIUM 40 MG: 100 INJECTION SUBCUTANEOUS at 12:06

## 2022-11-12 RX ADMIN — KETOROLAC TROMETHAMINE 15 MG: 30 INJECTION, SOLUTION INTRAMUSCULAR; INTRAVENOUS at 06:27

## 2022-11-12 RX ADMIN — HYDROMORPHONE HYDROCHLORIDE 0.5 MG: 1 INJECTION, SOLUTION INTRAMUSCULAR; INTRAVENOUS; SUBCUTANEOUS at 06:27

## 2022-11-12 RX ADMIN — METRONIDAZOLE 500 MG: 500 INJECTION, SOLUTION INTRAVENOUS at 15:03

## 2022-11-12 RX ADMIN — HYDROCODONE BITARTRATE AND ACETAMINOPHEN 2 TABLET: 5; 325 TABLET ORAL at 03:27

## 2022-11-12 RX ADMIN — HYDROMORPHONE HYDROCHLORIDE 0.5 MG: 1 INJECTION, SOLUTION INTRAMUSCULAR; INTRAVENOUS; SUBCUTANEOUS at 20:29

## 2022-11-12 RX ADMIN — SODIUM CHLORIDE 3 G: 9 INJECTION, SOLUTION INTRAVENOUS at 17:44

## 2022-11-12 RX ADMIN — METRONIDAZOLE 500 MG: 500 INJECTION, SOLUTION INTRAVENOUS at 22:48

## 2022-11-12 RX ADMIN — KETOROLAC TROMETHAMINE 15 MG: 30 INJECTION, SOLUTION INTRAMUSCULAR; INTRAVENOUS at 17:44

## 2022-11-12 RX ADMIN — AMLODIPINE BESYLATE 5 MG: 5 TABLET ORAL at 15:34

## 2022-11-12 RX ADMIN — HYDROCODONE BITARTRATE AND ACETAMINOPHEN 2 TABLET: 5; 325 TABLET ORAL at 15:38

## 2022-11-12 NOTE — PROGRESS NOTES
"General Surgery  Progress Note    CC: Follow-up colon cancer    POD#1 laparoscopic right colon resection and cholecystectomy    S: His pain is well controlled.  He denies any nausea or vomiting.  He has not passed flatus or bowel movement yet.    O:/89 (BP Location: Right arm, Patient Position: Lying)   Pulse 93   Temp 97.4 °F (36.3 °C) (Oral)   Resp 16   Ht 188 cm (74\")   Wt (!) 144 kg (316 lb 12.8 oz)   SpO2 96%   BMI 40.67 kg/m²     Intake & Output:  UOP: 640 mL/24 hrs    GENERAL: alert, well appearing, and in no distress  HEENT: normocephalic, atraumatic, moist mucous membranes, clear sclerae   CHEST: clear to auscultation, no wheezes, rales or rhonchi, symmetric air entry  CARDIAC: regular rate and rhythm    ABDOMEN: Soft, nondistended, incisions clean/dry/intact with glue, mild incisional tenderness  EXTREMITIES: no cyanosis, clubbing, or edema   SKIN: Warm and moist, no rashes    LABS  Results from last 7 days   Lab Units 11/07/22  1512   WBC 10*3/mm3 7.22   HEMOGLOBIN g/dL 8.4*   HEMATOCRIT % 29.4*   PLATELETS 10*3/mm3 330   MONOCYTES % % 9.0     Results from last 7 days   Lab Units 11/07/22  1512   SODIUM mmol/L 139   POTASSIUM mmol/L 4.4   CHLORIDE mmol/L 103   CO2 mmol/L 24.4   BUN mg/dL 11   CREATININE mg/dL 0.95   CALCIUM mg/dL 9.0   BILIRUBIN mg/dL <0.2   ALK PHOS U/L 61   ALT (SGPT) U/L 14   AST (SGOT) U/L 19   GLUCOSE mg/dL 90             A/P: 57 y.o. male POD#1 laparoscopic right colon resection and cholecystectomy    Advance to regular diet and await return of bowel function.  Possibly home tomorrow.    Shelia Mason MD  General, Robotic, and Endoscopic Surgery  [facility]    [address], [address]  [place] [address]  P: [phone]  F: [phone]     "

## 2022-11-12 NOTE — PLAN OF CARE
Goal Outcome Evaluation:           Progress: no change  VSS, AOx4. Pt had complaint moderate complaints of pain this shift, norco 10 mg given x 2. Pt had complaints of LUE numbness and tingling from mid FA to hand, advised will notify pass on to notify MD. Will continue plan of care and monitoring.

## 2022-11-13 VITALS
SYSTOLIC BLOOD PRESSURE: 122 MMHG | OXYGEN SATURATION: 97 % | DIASTOLIC BLOOD PRESSURE: 80 MMHG | HEART RATE: 81 BPM | BODY MASS INDEX: 40.43 KG/M2 | WEIGHT: 315 LBS | TEMPERATURE: 97.2 F | HEIGHT: 74 IN | RESPIRATION RATE: 18 BRPM

## 2022-11-13 PROCEDURE — 25010000002 ENOXAPARIN PER 10 MG: Performed by: SURGERY

## 2022-11-13 PROCEDURE — 25010000002 KETOROLAC TROMETHAMINE PER 15 MG: Performed by: SURGERY

## 2022-11-13 RX ORDER — HYDROCODONE BITARTRATE AND ACETAMINOPHEN 5; 325 MG/1; MG/1
1-2 TABLET ORAL EVERY 4 HOURS PRN
Qty: 24 TABLET | Refills: 0 | Status: SHIPPED | OUTPATIENT
Start: 2022-11-13 | End: 2022-11-28

## 2022-11-13 RX ORDER — ONDANSETRON 4 MG/1
4 TABLET, FILM COATED ORAL EVERY 6 HOURS PRN
Qty: 10 TABLET | Refills: 1 | Status: SHIPPED | OUTPATIENT
Start: 2022-11-13 | End: 2023-01-05 | Stop reason: SDUPTHER

## 2022-11-13 RX ADMIN — HYDROCODONE BITARTRATE AND ACETAMINOPHEN 1 TABLET: 5; 325 TABLET ORAL at 03:49

## 2022-11-13 RX ADMIN — ENOXAPARIN SODIUM 40 MG: 100 INJECTION SUBCUTANEOUS at 08:45

## 2022-11-13 RX ADMIN — KETOROLAC TROMETHAMINE 15 MG: 30 INJECTION, SOLUTION INTRAMUSCULAR; INTRAVENOUS at 06:01

## 2022-11-13 RX ADMIN — AMLODIPINE BESYLATE 5 MG: 5 TABLET ORAL at 08:45

## 2022-11-13 RX ADMIN — KETOROLAC TROMETHAMINE 15 MG: 30 INJECTION, SOLUTION INTRAMUSCULAR; INTRAVENOUS at 00:20

## 2022-11-13 RX ADMIN — PANTOPRAZOLE SODIUM 40 MG: 40 TABLET, DELAYED RELEASE ORAL at 08:45

## 2022-11-13 NOTE — DISCHARGE INSTRUCTIONS
1.  May shower  2.  Regular diet  3.  May walk is much as he likes, including stairs.  No lifting over 20 pounds  4.  My office will call him with postop date and time  5.  I will call him when pathology report becomes available

## 2022-11-13 NOTE — DISCHARGE SUMMARY
DATE OF ADMIT: 11/11/2022    DATE OF DISCHARGE: 11/13/2022    DIAGNOSIS: Carcinoma of ascending colon    FINAL PATHOLOGY: Pending    PROCEDURES: Laparoscopic right colectomy 11/11/2022    SUMMARY OF HOSPITAL COURSE:   Uneventful postop course. Tolerating diet, incisions in good order and afebrile with stable vital signs at discharge. Prescribed hydrocodone for pain.    DIET: Regular    ACTIVITY: Walking encouraged, no lifting or strenuous activity    MEDICATIONS: Refer to MAR    FOLLOW-UP: To call office and schedule 1 week follow-up appointment    Nigel Tolentino M.D.

## 2022-11-14 LAB
BH BB BLOOD EXPIRATION DATE: NORMAL
BH BB BLOOD TYPE BARCODE: 5100
BH BB BLOOD TYPE BARCODE: 5100
BH BB BLOOD TYPE BARCODE: 6200
BH BB BLOOD TYPE BARCODE: 6200
BH BB DISPENSE STATUS: NORMAL
BH BB PRODUCT CODE: NORMAL
BH BB UNIT NUMBER: NORMAL
CROSSMATCH INTERPRETATION: NORMAL
UNIT  ABO: NORMAL
UNIT  RH: NORMAL

## 2022-11-14 NOTE — CASE MANAGEMENT/SOCIAL WORK
Case Management Discharge Note      Final Note: Patient discharged home, no needs.         Selected Continued Care - Discharged on 11/13/2022 Admission date: 11/11/2022 - Discharge disposition: Home or Self Care    Destination    No services have been selected for the patient.              Durable Medical Equipment    No services have been selected for the patient.              Dialysis/Infusion    No services have been selected for the patient.              Home Medical Care    No services have been selected for the patient.              Therapy    No services have been selected for the patient.              Community Resources    No services have been selected for the patient.              Community & DME    No services have been selected for the patient.                       Final Discharge Disposition Code: 01 - home or self-care

## 2022-11-15 ENCOUNTER — APPOINTMENT (OUTPATIENT)
Dept: ONCOLOGY | Facility: HOSPITAL | Age: 57
End: 2022-11-15

## 2022-11-16 ENCOUNTER — TELEPHONE (OUTPATIENT)
Dept: SURGERY | Facility: CLINIC | Age: 57
End: 2022-11-16

## 2022-11-16 DIAGNOSIS — C18.2 CANCER OF ASCENDING COLON: Primary | ICD-10-CM

## 2022-11-16 RX ORDER — HYDROCODONE BITARTRATE AND ACETAMINOPHEN 5; 325 MG/1; MG/1
TABLET ORAL
Qty: 24 TABLET | Refills: 0 | Status: SHIPPED | OUTPATIENT
Start: 2022-11-16 | End: 2022-11-28

## 2022-11-16 NOTE — TELEPHONE ENCOUNTER
Patient calling for a refill on his Hydrocodone. If ok to refill, they would like this called into his [facility] pharmacy in [place] (we have this on file).

## 2022-11-17 ENCOUNTER — INFUSION (OUTPATIENT)
Dept: ONCOLOGY | Facility: HOSPITAL | Age: 57
End: 2022-11-17

## 2022-11-17 ENCOUNTER — APPOINTMENT (OUTPATIENT)
Dept: LAB | Facility: HOSPITAL | Age: 57
End: 2022-11-17

## 2022-11-17 ENCOUNTER — LAB (OUTPATIENT)
Dept: OTHER | Facility: HOSPITAL | Age: 57
End: 2022-11-17

## 2022-11-17 VITALS
BODY MASS INDEX: 39.91 KG/M2 | TEMPERATURE: 96.6 F | WEIGHT: 311 LBS | HEART RATE: 91 BPM | SYSTOLIC BLOOD PRESSURE: 124 MMHG | DIASTOLIC BLOOD PRESSURE: 86 MMHG | OXYGEN SATURATION: 97 % | RESPIRATION RATE: 18 BRPM | HEIGHT: 74 IN

## 2022-11-17 DIAGNOSIS — D50.0 IRON DEFICIENCY ANEMIA DUE TO CHRONIC BLOOD LOSS: Primary | ICD-10-CM

## 2022-11-17 PROCEDURE — 96365 THER/PROPH/DIAG IV INF INIT: CPT

## 2022-11-17 PROCEDURE — 63710000001 DIPHENHYDRAMINE PER 50 MG: Performed by: INTERNAL MEDICINE

## 2022-11-17 PROCEDURE — 25010000002 IRON SUCROSE PER 1 MG: Performed by: INTERNAL MEDICINE

## 2022-11-17 RX ORDER — ACETAMINOPHEN 325 MG/1
650 TABLET ORAL ONCE
Status: COMPLETED | OUTPATIENT
Start: 2022-11-17 | End: 2022-11-17

## 2022-11-17 RX ORDER — SODIUM CHLORIDE 9 MG/ML
250 INJECTION, SOLUTION INTRAVENOUS ONCE
Status: COMPLETED | OUTPATIENT
Start: 2022-11-17 | End: 2022-11-17

## 2022-11-17 RX ORDER — DIPHENHYDRAMINE HCL 25 MG
25 CAPSULE ORAL ONCE
Status: COMPLETED | OUTPATIENT
Start: 2022-11-17 | End: 2022-11-17

## 2022-11-17 RX ADMIN — IRON SUCROSE 200 MG: 20 INJECTION, SOLUTION INTRAVENOUS at 11:24

## 2022-11-17 RX ADMIN — SODIUM CHLORIDE 250 ML: 9 INJECTION, SOLUTION INTRAVENOUS at 10:50

## 2022-11-17 RX ADMIN — ACETAMINOPHEN 650 MG: 325 TABLET ORAL at 10:50

## 2022-11-17 RX ADMIN — DIPHENHYDRAMINE HYDROCHLORIDE 25 MG: 25 CAPSULE ORAL at 10:50

## 2022-11-17 NOTE — NURSING NOTE
Pt not scheduled for his last dose of venofer. Message sent to Tung espino RN covering for April regarding appt times and MD visit.     Tung Rodas RN Cook, Mary E, RN; [facility]  Plan dates adjusted. Patient is welcome to push MD appointment and the labs that are due the day before MD visit back to accommodate last dose of Venofer.     Scheduling - please schedule 5th dose of Venofer and adjust appointments as needed to accommodate that. Reminder: patient needs labs drawn 1 day before MD visit.           Previous Messages     ----- Message -----   From: Meliza Betancourt RN   Sent: 11/17/2022  11:33 AM EST   To: Tung Rodas RN   Subject: appt schedule                                     Damián Lazaro is getting his 3rd dose out of 5 doses today and is scheduled for his 4th venofer on 11-22 and is not scheduled for his 4th dose. He missed last week due to death in family and having surgery and needs an appt for his last dose which will be the week that he sees Dr Ansari. Do you think he needs to move back his appt with Dr Ansari since he will not have his last dose yet?   And can you adjust the dates on his plan.     Thanks      Pt tolerated infusion without complications and informed pt that scheduling will be calling with new appts. Ady lentz

## 2022-11-21 DIAGNOSIS — A04.8 BACTERIAL INFECTION DUE TO HELICOBACTER PYLORI: Primary | ICD-10-CM

## 2022-11-21 LAB
LAB AP CASE REPORT: NORMAL
LAB AP DIAGNOSIS COMMENT: NORMAL
LAB AP SYNOPTIC CHECKLIST: NORMAL
Lab: NORMAL
PATH REPORT.ADDENDUM SPEC: NORMAL
PATH REPORT.FINAL DX SPEC: NORMAL
PATH REPORT.GROSS SPEC: NORMAL

## 2022-11-21 RX ORDER — METRONIDAZOLE 250 MG/1
250 TABLET ORAL 4 TIMES DAILY
Qty: 56 TABLET | Refills: 0 | Status: SHIPPED | OUTPATIENT
Start: 2022-11-21 | End: 2022-12-05

## 2022-11-21 RX ORDER — TETRACYCLINE HYDROCHLORIDE 500 MG/1
500 CAPSULE ORAL 4 TIMES DAILY
Qty: 56 CAPSULE | Refills: 0 | Status: SHIPPED | OUTPATIENT
Start: 2022-11-21

## 2022-11-21 RX ORDER — OMEPRAZOLE 40 MG/1
40 CAPSULE, DELAYED RELEASE ORAL 2 TIMES DAILY
Qty: 28 CAPSULE | Refills: 0 | Status: SHIPPED | OUTPATIENT
Start: 2022-11-21 | End: 2022-12-05

## 2022-11-21 RX ORDER — BISMUTH SUBSALICYLATE 262 MG
524 TABLET,CHEWABLE ORAL 4 TIMES DAILY
Qty: 112 TABLET | Refills: 0 | Status: SHIPPED | OUTPATIENT
Start: 2022-11-21

## 2022-11-22 ENCOUNTER — INFUSION (OUTPATIENT)
Dept: ONCOLOGY | Facility: HOSPITAL | Age: 57
End: 2022-11-22

## 2022-11-22 VITALS
HEIGHT: 74 IN | HEART RATE: 78 BPM | DIASTOLIC BLOOD PRESSURE: 79 MMHG | TEMPERATURE: 98.5 F | BODY MASS INDEX: 40.35 KG/M2 | SYSTOLIC BLOOD PRESSURE: 129 MMHG | WEIGHT: 314.4 LBS | RESPIRATION RATE: 18 BRPM | OXYGEN SATURATION: 98 %

## 2022-11-22 DIAGNOSIS — D50.0 IRON DEFICIENCY ANEMIA DUE TO CHRONIC BLOOD LOSS: Primary | ICD-10-CM

## 2022-11-22 PROCEDURE — 63710000001 DIPHENHYDRAMINE PER 50 MG: Performed by: INTERNAL MEDICINE

## 2022-11-22 PROCEDURE — 96365 THER/PROPH/DIAG IV INF INIT: CPT

## 2022-11-22 PROCEDURE — 25010000002 IRON SUCROSE PER 1 MG: Performed by: INTERNAL MEDICINE

## 2022-11-22 RX ORDER — SODIUM CHLORIDE 9 MG/ML
250 INJECTION, SOLUTION INTRAVENOUS ONCE
Status: COMPLETED | OUTPATIENT
Start: 2022-11-22 | End: 2022-11-22

## 2022-11-22 RX ORDER — ACETAMINOPHEN 325 MG/1
650 TABLET ORAL ONCE
Status: COMPLETED | OUTPATIENT
Start: 2022-11-22 | End: 2022-11-22

## 2022-11-22 RX ORDER — DIPHENHYDRAMINE HCL 25 MG
25 CAPSULE ORAL ONCE
Status: COMPLETED | OUTPATIENT
Start: 2022-11-22 | End: 2022-11-22

## 2022-11-22 RX ADMIN — IRON SUCROSE 200 MG: 20 INJECTION, SOLUTION INTRAVENOUS at 13:09

## 2022-11-22 RX ADMIN — SODIUM CHLORIDE 250 ML: 9 INJECTION, SOLUTION INTRAVENOUS at 12:51

## 2022-11-22 RX ADMIN — ACETAMINOPHEN 650 MG: 325 TABLET ORAL at 12:51

## 2022-11-22 RX ADMIN — DIPHENHYDRAMINE HYDROCHLORIDE 25 MG: 25 CAPSULE ORAL at 12:52

## 2022-11-28 ENCOUNTER — OFFICE VISIT (OUTPATIENT)
Dept: SURGERY | Facility: CLINIC | Age: 57
End: 2022-11-28

## 2022-11-28 ENCOUNTER — TELEPHONE (OUTPATIENT)
Dept: SURGERY | Facility: CLINIC | Age: 57
End: 2022-11-28

## 2022-11-28 DIAGNOSIS — C18.2 CANCER OF ASCENDING COLON: Primary | ICD-10-CM

## 2022-11-28 PROCEDURE — 99024 POSTOP FOLLOW-UP VISIT: CPT

## 2022-11-28 NOTE — PROGRESS NOTES
CC: Postop visit    History of presenting illness:   This is a 57-year-old male here for postoperative visit after undergoing laparoscopic right colectomy for right colon cancer and laparoscopic cholecystectomy for gallstones.  He reports he is doing well overall and is recovering well from surgery.  He denies any fevers or chills, nausea or vomiting, diarrhea or constipation.  He states he has an upcoming appointment with oncology in late December.  He has his last iron infusion scheduled for tomorrow.    Surgical History: Laparoscopic right colectomy, laparoscopic cholecystectomy 11/11/2022, Dr. Tolentino    Pathology:   Final Diagnosis   1. Gallbladder, Cholecystectomy:   A. Gallbladder with cholesterolosis and cholelithiasis.  B. Single benign lymph node.  2. Right Colon, Laparoscopic Right Colectomy:               A. INVASIVE MODERATELY DIFFERENTIATED ADENOCARCINOMA.               B. Tumor size: 5.2 cm in greatest dimension.               C. Tumor invades through muscularis propria and into pericolic fat.   D. Margins are negative for malignancy; Closest distance: Tumor is present 10 cm from the mesenteric margin.               E. Negative for perineural invasion.               F. Negative for lymphovascular space invasion.               G. Appendix with fibrous obliteration.               H. ONE OF THIRTY LYMPH NODES, POSITIVE FOR CARCINOMA (1/30).               D. See Synoptic Report and Comment.     Review of Systems:  Constitutional: Denies fever or chills  Respiratory: negative for SOB, cough, hemoptysis or wheezing  Cardiovascular: negative for chest pain, palpitations or peripheral edema  Gastrointestinal: Negative for abdominal pain, nausea, vomiting, diarrhea, constipation    Physical Exam:  BMI: 39.31  General: alert and oriented, appropriate, no acute distress  Eyes: No scleral icterus, extraocular movements are intact  Respiratory: There is good bilateral chest expansion, no use of accessory muscles  is noted  Cardiovascular: No jugular venous distention or peripheral edema is seen  Gastrointestinal: Soft, nontender, incisions are healing well, no warmth, erythema, drainage noted    Assessment and plan:   57-year-old male status post laparoscopic right colectomy and laparoscopic cholecystectomy.  His pathology revealed gallbladder with cholesterolosis and cholelithiasis. The right colon revealed invasive moderately differentiated adenocarcinoma, with 1/30+ lymph nodes (pT3N1a).  Dr. Tolentino and I reviewed his pathology report with him.  He is scheduled to see Dr. Ansari in late December.  However, we will call and see if we can move up his appointment sooner.  Discussed with patient if a port is warranted, to contact the office and we will schedule this for him.  Discussed activity restrictions, he should lift no more than 20 pounds for 6 weeks.  He verbalized understanding and all questions were answered.    Shelia Ruggiero PA-C  General Surgery    [facility]  [address], [address]  [place], [place], [address]  P: [phone]  F: [phone]

## 2022-11-28 NOTE — TELEPHONE ENCOUNTER
ATTEMPTED TO CONTACT PT REGARDING APPT W/ CODE BEING MOVED UP. HE'S TO HAVE LABS DRAWN DURING IRON INFUSION APPT AND SEE DR SINGH ON 12/5 @ 10 AM FOR THE [address], [address]. PT VM WAS FULL, UNABLE TO LEAVE A MESSAGE.

## 2022-11-29 ENCOUNTER — INFUSION (OUTPATIENT)
Dept: ONCOLOGY | Facility: HOSPITAL | Age: 57
End: 2022-11-29

## 2022-11-29 VITALS
HEART RATE: 93 BPM | DIASTOLIC BLOOD PRESSURE: 97 MMHG | OXYGEN SATURATION: 99 % | SYSTOLIC BLOOD PRESSURE: 142 MMHG | BODY MASS INDEX: 39.3 KG/M2 | HEIGHT: 74 IN | TEMPERATURE: 97.2 F | WEIGHT: 306.2 LBS | RESPIRATION RATE: 18 BRPM

## 2022-11-29 DIAGNOSIS — D50.0 IRON DEFICIENCY ANEMIA DUE TO CHRONIC BLOOD LOSS: Primary | ICD-10-CM

## 2022-11-29 LAB
BASOPHILS # BLD AUTO: 0.04 10*3/MM3 (ref 0–0.2)
BASOPHILS NFR BLD AUTO: 1 % (ref 0–1.5)
DEPRECATED RDW RBC AUTO: 57.9 FL (ref 37–54)
EOSINOPHIL # BLD AUTO: 0.21 10*3/MM3 (ref 0–0.4)
EOSINOPHIL NFR BLD AUTO: 5.1 % (ref 0.3–6.2)
ERYTHROCYTE [DISTWIDTH] IN BLOOD BY AUTOMATED COUNT: 21.2 % (ref 12.3–15.4)
HCT VFR BLD AUTO: 36.1 % (ref 37.5–51)
HGB BLD-MCNC: 10.1 G/DL (ref 13–17.7)
IMM GRANULOCYTES # BLD AUTO: 0.01 10*3/MM3 (ref 0–0.05)
IMM GRANULOCYTES NFR BLD AUTO: 0.2 % (ref 0–0.5)
LYMPHOCYTES # BLD AUTO: 2.29 10*3/MM3 (ref 0.7–3.1)
LYMPHOCYTES NFR BLD AUTO: 56 % (ref 19.6–45.3)
MCH RBC QN AUTO: 21.2 PG (ref 26.6–33)
MCHC RBC AUTO-ENTMCNC: 28 G/DL (ref 31.5–35.7)
MCV RBC AUTO: 75.8 FL (ref 79–97)
MONOCYTES # BLD AUTO: 0.56 10*3/MM3 (ref 0.1–0.9)
MONOCYTES NFR BLD AUTO: 13.7 % (ref 5–12)
NEUTROPHILS NFR BLD AUTO: 0.98 10*3/MM3 (ref 1.7–7)
NEUTROPHILS NFR BLD AUTO: 24 % (ref 42.7–76)
NRBC BLD AUTO-RTO: 0 /100 WBC (ref 0–0.2)
PLAT MORPH BLD: NORMAL
PLATELET # BLD AUTO: 327 10*3/MM3 (ref 140–450)
PMV BLD AUTO: 9.5 FL (ref 6–12)
RBC # BLD AUTO: 4.76 10*6/MM3 (ref 4.14–5.8)
RBC MORPH BLD: NORMAL
WBC MORPH BLD: NORMAL
WBC NRBC COR # BLD: 4.09 10*3/MM3 (ref 3.4–10.8)

## 2022-11-29 PROCEDURE — 85007 BL SMEAR W/DIFF WBC COUNT: CPT | Performed by: INTERNAL MEDICINE

## 2022-11-29 PROCEDURE — 63710000001 DIPHENHYDRAMINE PER 50 MG: Performed by: INTERNAL MEDICINE

## 2022-11-29 PROCEDURE — 96365 THER/PROPH/DIAG IV INF INIT: CPT

## 2022-11-29 PROCEDURE — 85025 COMPLETE CBC W/AUTO DIFF WBC: CPT | Performed by: INTERNAL MEDICINE

## 2022-11-29 PROCEDURE — 25010000002 IRON SUCROSE PER 1 MG: Performed by: INTERNAL MEDICINE

## 2022-11-29 RX ORDER — SODIUM CHLORIDE 9 MG/ML
250 INJECTION, SOLUTION INTRAVENOUS ONCE
Status: COMPLETED | OUTPATIENT
Start: 2022-11-29 | End: 2022-11-29

## 2022-11-29 RX ORDER — ACETAMINOPHEN 325 MG/1
650 TABLET ORAL ONCE
Status: COMPLETED | OUTPATIENT
Start: 2022-11-29 | End: 2022-11-29

## 2022-11-29 RX ORDER — DIPHENHYDRAMINE HCL 25 MG
25 CAPSULE ORAL ONCE
Status: COMPLETED | OUTPATIENT
Start: 2022-11-29 | End: 2022-11-29

## 2022-11-29 RX ADMIN — SODIUM CHLORIDE 250 ML: 9 INJECTION, SOLUTION INTRAVENOUS at 13:59

## 2022-11-29 RX ADMIN — DIPHENHYDRAMINE HYDROCHLORIDE 25 MG: 25 CAPSULE ORAL at 14:00

## 2022-11-29 RX ADMIN — ACETAMINOPHEN 650 MG: 325 TABLET ORAL at 14:00

## 2022-11-29 RX ADMIN — IRON SUCROSE 200 MG: 20 INJECTION, SOLUTION INTRAVENOUS at 14:25

## 2022-11-29 NOTE — NURSING NOTE
1600   I called and spoke with patient to inform him his appointment was with Dr Ansari on 12/05/22 at [facility]. At 1000  Pt V/U

## 2022-11-30 ENCOUNTER — TELEPHONE (OUTPATIENT)
Dept: SURGERY | Facility: CLINIC | Age: 57
End: 2022-11-30

## 2022-11-30 NOTE — TELEPHONE ENCOUNTER
SPOKE TO PT TO VERIFY THAT HE HAD BEEN INFORMED THAT HIS APPT W/DR CODE HAS BEEN CHANGED TO 12/5 @ 10 AM FOR [address], [address].

## 2022-12-01 ENCOUNTER — APPOINTMENT (OUTPATIENT)
Dept: LAB | Facility: HOSPITAL | Age: 57
End: 2022-12-01
Payer: MEDICAID

## 2022-12-02 ENCOUNTER — TELEPHONE (OUTPATIENT)
Dept: ONCOLOGY | Facility: CLINIC | Age: 57
End: 2022-12-02

## 2022-12-02 NOTE — TELEPHONE ENCOUNTER
----- Message from April Valeria Craig RN sent at 12/2/2022  2:42 PM EST -----  Hi, this pt did not have his iron lab done    · IV iron  · MD 6 to 8 weeks.  1 day prior: Ferritin, iron panel, CBC, reticulate hemoglobin  · Stop oral iron    He is scheduled Mon for vitals only. Needs to come in early for labs

## 2022-12-02 NOTE — PROGRESS NOTES
.     REASON FOR FOLLOWUP :   Resected colon cancer, iron deficiency anemia    HISTORY OF PRESENT ILLNESS:  The patient is a 57 y.o. year old male  who is here for follow-up with the above-mentioned history.    He is here today with a new diagnosis of colon cancer.  Dr. Tolentino has discussed with him we would likely recommend adjuvant chemotherapy.  He states he is recovered very well from surgery.  He denies neuropathy.    Past Medical History:   Diagnosis Date   • Chronic cough     SINCE COVID DIAGNOSIS 9/2021   • Colon cancer (HCC) 11/03/2022    Ascending colon invasive moderately differentiated adenocarcinoma   • Colon polyps 11/03/2022    Transverse colon: fragments of tubular adenoma, rectum: fragments of tubular adenoma and hyperplastic polyp   • History of COVID-19 09/2021   • Hypertension    • Iron deficiency anemia      Past Surgical History:   Procedure Laterality Date   • CHOLECYSTECTOMY WITH INTRAOPERATIVE CHOLANGIOGRAM N/A 11/11/2022    Procedure: LAPAROSCOPIC CHOLECYSTECTOMY;  Surgeon: Nigel Tolentino MD;  Location: [facility];  Service: General;  Laterality: N/A;   • COLON RESECTION Right 11/11/2022    Procedure: COLON RESECTION RIGHT;  Surgeon: Nigel Tolentino MD;  Location: [facility] OR;  Service: General;  Laterality: Right;   • COLONOSCOPY N/A 11/03/2022    Procedure: COLONOSCOPY into cecum with tattoo ink injection, bx's and cold bx/snare polypectomies;  Surgeon: Anup Oconnell MD;  Location: [facility] ENDOSCOPY;  Service: Gastroenterology;  Laterality: N/A;  pre: iron def anemia, heme positive stool  post: polyps, colon mass   • ENDOSCOPY N/A 11/03/2022    Procedure: ESOPHAGOGASTRODUODENOSCOPYr with bx;  Surgeon: Anup Oconnell MD;  Location: [facility] ENDOSCOPY;  Service: Gastroenterology;  Laterality: N/A;  pre:  iron def anemia, heme positive stool  post: gastritis    • INGUINAL HERNIA REPAIR Bilateral 1970   • LACERATION REPAIR Right     THUMB/ HAND        MEDICATIONS    Current Outpatient Medications:   •  amLODIPine (NORVASC) 5 MG tablet, Take 1 tablet by mouth Daily., Disp: , Rfl:   •  Bismuth 262 MG chewable tablet, Chew 2 tablets 4 (Four) Times a Day., Disp: 112 tablet, Rfl: 0  •  Chlorcyclizine-Pseudoephed (Stahist AD) 25-60 MG tablet, Take 1 tablet by mouth As Needed., Disp: , Rfl:   •  ferrous sulfate 325 (65 FE) MG tablet, Take 1 tablet by mouth Daily With Breakfast., Disp: , Rfl:   •  fluticasone (FLONASE) 50 MCG/ACT nasal spray, 2 sprays into the nostril(s) as directed by provider As Needed., Disp: , Rfl:   •  ipratropium-albuterol (DUO-NEB) 0.5-2.5 mg/3 ml nebulizer, Take 3 mL by nebulization 4 (Four) Times a Day., Disp: , Rfl:   •  meloxicam (MOBIC) 7.5 MG tablet, Take 1 tablet by mouth Daily., Disp: , Rfl:   •  metroNIDAZOLE (FLAGYL) 250 MG tablet, Take 1 tablet by mouth 4 (Four) Times a Day for 14 days., Disp: 56 tablet, Rfl: 0  •  omeprazole (priLOSEC) 40 MG capsule, Take 1 capsule by mouth 2 (Two) Times a Day for 14 days., Disp: 28 capsule, Rfl: 0  •  ondansetron (Zofran) 4 MG tablet, Take 1 tablet by mouth Every 6 (Six) Hours As Needed for Nausea or Vomiting., Disp: 10 tablet, Rfl: 1  •  pantoprazole (PROTONIX) 40 MG EC tablet, Take 1 tablet by mouth Daily., Disp: , Rfl:   •  tetracycline (ACHROMYCIN,SUMYCIN) 500 MG capsule, Take 1 capsule by mouth 4 (Four) Times a Day., Disp: 56 capsule, Rfl: 0  •  benzonatate (TESSALON) 200 MG capsule, Take 1 capsule by mouth As Needed for Cough., Disp: , Rfl:   •  promethazine-dextromethorphan (PROMETHAZINE-DM) 6.25-15 MG/5ML syrup, Take  by mouth 4 (Four) Times a Day As Needed., Disp: , Rfl:     ALLERGIES:   No Known Allergies    SOCIAL HISTORY:       Social History     Socioeconomic History   • Marital status: Single   Tobacco Use   • Smoking status: Never     Passive exposure: Never   • Smokeless tobacco: Never   Vaping Use   • Vaping Use: Never used   Substance and Sexual Activity   • Alcohol use: Yes     " Comment: rarely   • Drug use: Never   • Sexual activity: Defer         FAMILY HISTORY:  Family History   Problem Relation Age of Onset   • Heart failure Mother    • Clotting disorder Father         DVT   • Hypertension Brother    • Colon cancer Neg Hx    • Crohn's disease Neg Hx    • Colon polyps Neg Hx    • Irritable bowel syndrome Neg Hx    • Ulcerative colitis Neg Hx    • Malig Hyperthermia Neg Hx        REVIEW OF SYSTEMS:  Review of Systems   Constitutional: Negative for activity change.   HENT: Negative for nosebleeds and trouble swallowing.    Respiratory: Negative for shortness of breath and wheezing.    Cardiovascular: Negative for chest pain and palpitations.   Gastrointestinal: Negative for diarrhea and nausea.   Genitourinary: Negative for dysuria and hematuria.   Musculoskeletal: Negative for arthralgias and myalgias.   Neurological: Negative for seizures and syncope.   Hematological: Negative for adenopathy. Does not bruise/bleed easily.   Psychiatric/Behavioral: Negative for confusion.              Vitals:    12/05/22 1028   BP: 136/96   Pulse: 77   Resp: 16   Temp: 98.2 °F (36.8 °C)   TempSrc: Temporal   SpO2: 98%   Weight: (!) 139 kg (306 lb 8 oz)   Height: 188 cm (74.02\")   PainSc: 0-No pain     Current Status 12/5/2022   ECOG score 0      PHYSICAL EXAM:        CONSTITUTIONAL:  Vital signs reviewed.  No distress, looks comfortable.  EYES:  Conjunctiva and lids unremarkable.  PERRLA  EARS,NOSE,MOUTH,THROAT:  Ears and nose appear unremarkable.  Lips, teeth, gums appear unremarkable.  RESPIRATORY:  Normal respiratory effort.  Lungs clear to auscultation bilaterally.  CARDIOVASCULAR:  Normal S1, S2.  No murmurs rubs or gallops.  No significant lower extremity edema.  GASTROINTESTINAL: Abdomen appears unremarkable.  Nontender.  No hepatomegaly.  No splenomegaly.  LYMPHATIC:  No cervical, supraclavicular, axillary lymphadenopathy.  SKIN:  Warm.  No rashes.  PSYCHIATRIC:  Normal judgment and insight.  " Normal mood and affect.          RECENT LABS:        WBC   Date Value Ref Range Status   12/05/2022 6.39 3.40 - 10.80 10*3/mm3 Final   11/29/2022 4.09 3.40 - 10.80 10*3/mm3 Final   11/12/2022 13.53 (H) 3.40 - 10.80 10*3/mm3 Final   11/07/2022 7.22 3.40 - 10.80 10*3/mm3 Final   10/20/2022 6.24 3.40 - 10.80 10*3/mm3 Final     Hemoglobin   Date Value Ref Range Status   12/05/2022 11.1 (L) 13.0 - 17.7 g/dL Final   11/29/2022 10.1 (L) 13.0 - 17.7 g/dL Final   11/12/2022 8.2 (L) 13.0 - 17.7 g/dL Final   11/07/2022 8.4 (L) 13.0 - 17.7 g/dL Final   10/20/2022 9.0 (L) 13.0 - 17.7 g/dL Final     Platelets   Date Value Ref Range Status   12/05/2022 299 140 - 450 10*3/mm3 Final   11/29/2022 327 140 - 450 10*3/mm3 Final   11/12/2022 321 140 - 450 10*3/mm3 Final   11/07/2022 330 140 - 450 10*3/mm3 Final   10/20/2022 327 140 - 450 10*3/mm3 Final       Assessment & Plan   Cancer of ascending colon (HCC)  - Comprehensive Metabolic Panel  - CEA  - CT Chest With Contrast  - CBC & Differential  - Comprehensive Metabolic Panel        Damián Diaz   *Ascending colon adenocarcinoma  · Discovered on colonoscopy 11/3/2022, Dr. Oconnell, for MINA work-up.  Invasive moderately differentiated adenocarcinoma.  · CT AP 11/7/2022: Circumferential malignancy ascending colon with adjacent mesenteric LAD.  4 mm RML nodule stable from 6/8/2022.  Radiologist felt likely benign but recommended continued follow-up.  Granulomatous calcifications both lower lobes.  · Resection 11/11/2022, Dr. Tolentino: Moderately differentiated adenocarcinoma of cecum, 5.2 cm.  Grade 2.  Invades through muscularis propria into pericolic fat.  Margins negative.  No perineural invasion or lymphovascular invasion.  One of the 30 nodes positive.  · [id number], stage 3  · No deficiency of MMR proteins.  (pMMR) (consistent with STEFANIA)  · Per NCCN guidelines: Low risk stage III colon cancer preferred regimens are Capeox x3 months versus FOLFOX x3 to 6 months.  Reviewed these options  with the patient.  He has chosen Capeox x3 months  · CAPEOX starts 12/27/22  · Oxaliplatin D1.  Xeloda 850 mg/m2 per dose (2150 mg rounding for tablet strength), twice per day, D1-14/21 days.  Plan 4 cycles total, which is 3 months of therapy.    *Iron deficiency anemia  · 6/21/2022: Hb 7.3.  Oral iron daily started.  · Patient states early October 2022 Hb around 8.  · 10/20/2022 (initial consult with me): Ferritin 9.  3% saturation.  Hb 9.  Patient states he cannot tolerate oral iron anymore due to constipation and abdominal cramping.    · Insurance requires Venofer instead of Injectafer  · Completed 1000 mg Venofer on 11/29/2022.  · 12/5/2022: Ferritin 124, 39% saturation.    *Source of iron deficiency  · Colon cancer found and resected on 11/11/2022    *Microcytosis, likely due to iron deficiency  MCV 78.8    *Lightheadedness, dyspnea on exertion, fatigue.  Hopefully this will improve with IV iron.    *Class III obesity.  Being overweight can lead to cytopenias through hepatic steatosis.    Body mass index is 39.34 kg/m².  BMI 25 to <30 is overweight  BMI 30 to <35 is class 1 obesity  BMI 35 to <40 is class 2 obesity  BMI 40 or higher is class 3 obesity   Ideally, lose weight    Plan  · CAPEOX x4 cycles (every 3 weeks), to begin 12/27/2022  · Appointment made for all 4 cycles including a 2-week appointment after c1d1    Colon cancer is a new issue for me to address today.  I reviewed NCCN and up-to-date guidelines.  Also discussed with pharmacy so the xeloda will be ordered.  Discussed side effects of therapy.  Patient understands the goal is cure.  Handouts provided.  Patient wants therapy.    41 minutes.  Total time.  Same day

## 2022-12-05 ENCOUNTER — LAB (OUTPATIENT)
Dept: LAB | Facility: HOSPITAL | Age: 57
End: 2022-12-05
Payer: MEDICAID

## 2022-12-05 ENCOUNTER — APPOINTMENT (OUTPATIENT)
Dept: LAB | Facility: HOSPITAL | Age: 57
End: 2022-12-05
Payer: MEDICAID

## 2022-12-05 ENCOUNTER — OFFICE VISIT (OUTPATIENT)
Dept: ONCOLOGY | Facility: CLINIC | Age: 57
End: 2022-12-05

## 2022-12-05 VITALS
DIASTOLIC BLOOD PRESSURE: 96 MMHG | OXYGEN SATURATION: 98 % | SYSTOLIC BLOOD PRESSURE: 136 MMHG | TEMPERATURE: 98.2 F | WEIGHT: 306.5 LBS | HEART RATE: 77 BPM | HEIGHT: 74 IN | RESPIRATION RATE: 16 BRPM | BODY MASS INDEX: 39.34 KG/M2

## 2022-12-05 DIAGNOSIS — C18.2 CANCER OF ASCENDING COLON: Primary | ICD-10-CM

## 2022-12-05 DIAGNOSIS — D50.0 IRON DEFICIENCY ANEMIA DUE TO CHRONIC BLOOD LOSS: ICD-10-CM

## 2022-12-05 LAB
ALBUMIN SERPL-MCNC: 4.3 G/DL (ref 3.5–5.2)
ALBUMIN/GLOB SERPL: 1.2 G/DL (ref 1.1–2.4)
ALP SERPL-CCNC: 56 U/L (ref 38–116)
ALT SERPL W P-5'-P-CCNC: 24 U/L (ref 0–41)
ANION GAP SERPL CALCULATED.3IONS-SCNC: 13.6 MMOL/L (ref 5–15)
AST SERPL-CCNC: 28 U/L (ref 0–40)
BASOPHILS # BLD AUTO: 0.04 10*3/MM3 (ref 0–0.2)
BASOPHILS NFR BLD AUTO: 0.6 % (ref 0–1.5)
BILIRUB SERPL-MCNC: 0.2 MG/DL (ref 0.2–1.2)
BUN SERPL-MCNC: 11 MG/DL (ref 6–20)
BUN/CREAT SERPL: 11.7 (ref 7.3–30)
CALCIUM SPEC-SCNC: 9.3 MG/DL (ref 8.5–10.2)
CEA SERPL-MCNC: 1.04 NG/ML
CHLORIDE SERPL-SCNC: 104 MMOL/L (ref 98–107)
CO2 SERPL-SCNC: 23.4 MMOL/L (ref 22–29)
CREAT SERPL-MCNC: 0.94 MG/DL (ref 0.7–1.3)
DEPRECATED RDW RBC AUTO: 60.5 FL (ref 37–54)
EGFRCR SERPLBLD CKD-EPI 2021: 95.7 ML/MIN/1.73
EOSINOPHIL # BLD AUTO: 0.21 10*3/MM3 (ref 0–0.4)
EOSINOPHIL NFR BLD AUTO: 3.3 % (ref 0.3–6.2)
ERYTHROCYTE [DISTWIDTH] IN BLOOD BY AUTOMATED COUNT: 22 % (ref 12.3–15.4)
FERRITIN SERPL-MCNC: 123.7 NG/ML (ref 30–400)
GLOBULIN UR ELPH-MCNC: 3.6 GM/DL (ref 1.8–3.5)
GLUCOSE SERPL-MCNC: 118 MG/DL (ref 74–124)
HCT VFR BLD AUTO: 39.3 % (ref 37.5–51)
HGB BLD-MCNC: 11.1 G/DL (ref 13–17.7)
HGB RETIC QN AUTO: 26.1 PG (ref 29.8–36.1)
IMM GRANULOCYTES # BLD AUTO: 0.03 10*3/MM3 (ref 0–0.05)
IMM GRANULOCYTES NFR BLD AUTO: 0.5 % (ref 0–0.5)
IMM RETICS NFR: 32.5 % (ref 3–15.8)
IRON 24H UR-MRATE: 143 MCG/DL (ref 59–158)
IRON SATN MFR SERPL: 39 % (ref 14–48)
LYMPHOCYTES # BLD AUTO: 2.6 10*3/MM3 (ref 0.7–3.1)
LYMPHOCYTES NFR BLD AUTO: 40.7 % (ref 19.6–45.3)
MCH RBC QN AUTO: 22.2 PG (ref 26.6–33)
MCHC RBC AUTO-ENTMCNC: 28.2 G/DL (ref 31.5–35.7)
MCV RBC AUTO: 78.8 FL (ref 79–97)
MONOCYTES # BLD AUTO: 0.47 10*3/MM3 (ref 0.1–0.9)
MONOCYTES NFR BLD AUTO: 7.4 % (ref 5–12)
NEUTROPHILS NFR BLD AUTO: 3.04 10*3/MM3 (ref 1.7–7)
NEUTROPHILS NFR BLD AUTO: 47.5 % (ref 42.7–76)
NRBC BLD AUTO-RTO: 0 /100 WBC (ref 0–0.2)
PLATELET # BLD AUTO: 299 10*3/MM3 (ref 140–450)
PMV BLD AUTO: 8.4 FL (ref 6–12)
POTASSIUM SERPL-SCNC: 4.1 MMOL/L (ref 3.5–4.7)
PROT SERPL-MCNC: 7.9 G/DL (ref 6.3–8)
RBC # BLD AUTO: 4.99 10*6/MM3 (ref 4.14–5.8)
RETICS # AUTO: 0.11 10*6/MM3 (ref 0.02–0.13)
RETICS/RBC NFR AUTO: 2.12 % (ref 0.7–1.9)
SODIUM SERPL-SCNC: 141 MMOL/L (ref 134–145)
TIBC SERPL-MCNC: 370 MCG/DL (ref 249–505)
TRANSFERRIN SERPL-MCNC: 264 MG/DL (ref 200–360)
WBC NRBC COR # BLD: 6.39 10*3/MM3 (ref 3.4–10.8)

## 2022-12-05 PROCEDURE — 85046 RETICYTE/HGB CONCENTRATE: CPT

## 2022-12-05 PROCEDURE — 80053 COMPREHEN METABOLIC PANEL: CPT | Performed by: INTERNAL MEDICINE

## 2022-12-05 PROCEDURE — 82378 CARCINOEMBRYONIC ANTIGEN: CPT | Performed by: INTERNAL MEDICINE

## 2022-12-05 PROCEDURE — 85025 COMPLETE CBC W/AUTO DIFF WBC: CPT

## 2022-12-05 PROCEDURE — 84466 ASSAY OF TRANSFERRIN: CPT

## 2022-12-05 PROCEDURE — 82728 ASSAY OF FERRITIN: CPT

## 2022-12-05 PROCEDURE — 36415 COLL VENOUS BLD VENIPUNCTURE: CPT

## 2022-12-05 PROCEDURE — 83540 ASSAY OF IRON: CPT

## 2022-12-05 PROCEDURE — 99215 OFFICE O/P EST HI 40 MIN: CPT | Performed by: INTERNAL MEDICINE

## 2022-12-06 ENCOUNTER — SPECIALTY PHARMACY (OUTPATIENT)
Dept: PHARMACY | Facility: HOSPITAL | Age: 57
End: 2022-12-06

## 2022-12-06 DIAGNOSIS — C18.2 CANCER OF ASCENDING COLON: Primary | ICD-10-CM

## 2022-12-06 RX ORDER — CAPECITABINE 150 MG/1
150 TABLET, FILM COATED ORAL 2 TIMES DAILY
Qty: 28 TABLET | Refills: 3 | Status: SHIPPED | OUTPATIENT
Start: 2022-12-26 | End: 2023-02-23 | Stop reason: SDUPTHER

## 2022-12-06 RX ORDER — CAPECITABINE 500 MG/1
2000 TABLET, FILM COATED ORAL 2 TIMES DAILY
Qty: 56 TABLET | Refills: 3 | Status: SHIPPED | OUTPATIENT
Start: 2022-12-26 | End: 2022-12-06 | Stop reason: SDUPTHER

## 2022-12-06 RX ORDER — CAPECITABINE 150 MG/1
150 TABLET, FILM COATED ORAL 2 TIMES DAILY
Qty: 14 TABLET | Refills: 3 | Status: SHIPPED | OUTPATIENT
Start: 2022-12-26 | End: 2022-12-06 | Stop reason: SDUPTHER

## 2022-12-06 RX ORDER — CAPECITABINE 500 MG/1
2000 TABLET, FILM COATED ORAL 2 TIMES DAILY
Qty: 112 TABLET | Refills: 3 | Status: SHIPPED | OUTPATIENT
Start: 2022-12-26 | End: 2023-02-23 | Stop reason: SDUPTHER

## 2022-12-06 NOTE — PROGRESS NOTES
The following staff message was forwarded to my attention:      From: Jesus Ansari II, MD   Sent: 12/5/2022  12:01 PM EST   To: April Valeria Craig RN, *     Hi,     He needs to begin CAPEOX for 4 cycles total (3 months of adjuvant therapy).     Xeloda should be    Xeloda 850 mg/m2 per dose (2150 mg rounding for tablet strength), twice per day, D1-14/21 days.     Please as always double check my math     Please go ahead and order the Xeloda.  Target date to begin is 12/27/2022.        A Prior Auth for Capecitabine is not needed. The patient can fill this prescription with  [facility]. The co-pay is $0.    Genna Bennett - Care Coordinator   12/6/2022  10:05 EST

## 2022-12-08 ENCOUNTER — SPECIALTY PHARMACY (OUTPATIENT)
Dept: PHARMACY | Facility: HOSPITAL | Age: 57
End: 2022-12-08

## 2022-12-13 ENCOUNTER — SPECIALTY PHARMACY (OUTPATIENT)
Dept: ONCOLOGY | Facility: HOSPITAL | Age: 57
End: 2022-12-13
Payer: MEDICAID

## 2022-12-13 ENCOUNTER — OFFICE VISIT (OUTPATIENT)
Dept: ONCOLOGY | Facility: CLINIC | Age: 57
End: 2022-12-13

## 2022-12-13 ENCOUNTER — APPOINTMENT (OUTPATIENT)
Dept: OTHER | Facility: HOSPITAL | Age: 57
End: 2022-12-13
Payer: MEDICAID

## 2022-12-13 VITALS
OXYGEN SATURATION: 95 % | HEIGHT: 74 IN | BODY MASS INDEX: 40.16 KG/M2 | HEART RATE: 88 BPM | SYSTOLIC BLOOD PRESSURE: 137 MMHG | TEMPERATURE: 98 F | DIASTOLIC BLOOD PRESSURE: 95 MMHG | RESPIRATION RATE: 18 BRPM | WEIGHT: 312.9 LBS

## 2022-12-13 VITALS — BODY MASS INDEX: 40.16 KG/M2 | WEIGHT: 312.9 LBS

## 2022-12-13 DIAGNOSIS — C18.2 CANCER OF ASCENDING COLON: Primary | ICD-10-CM

## 2022-12-13 PROCEDURE — G0463 HOSPITAL OUTPT CLINIC VISIT: HCPCS

## 2022-12-13 PROCEDURE — 99212 OFFICE O/P EST SF 10 MIN: CPT | Performed by: NURSE PRACTITIONER

## 2022-12-13 NOTE — PROGRESS NOTES
TREATMENT  PREPARATION    Damián Diaz  [id number]  1965    Chief Complaint: Treatment preparation and needs assessment    History of present illness:  Damián Diaz is a 57 y.o. year old male who is here today for treatment preparation and needs assessment.  The patient has been diagnosed with   Encounter Diagnosis   Name Primary?   • Cancer of ascending colon (HCC) Yes    and is scheduled to begin treatment with:     Oncology History:    Oncology/Hematology History   Cancer of ascending colon (HCC)   11/7/2022 Initial Diagnosis    Cancer of ascending colon (HCC)     1/3/2023 -  Chemotherapy    OP COLON CapeOX Capecitabine / OXALIplatin         The current medication list and allergy list were reviewed and reconciled.     Past Medical History, Past Surgical History, Social History, Family History have been reviewed and are without significant changes except as mentioned.    Physical Exam:    Vitals:    12/13/22 1335   BP: 137/95   Pulse: 88   Resp: 18   Temp: 98 °F (36.7 °C)   SpO2: 95%     Vitals:    12/13/22 1335   PainSc: 0-No pain        ECOG score: 0         Physical Exam  Vitals reviewed.   HENT:      Head: Normocephalic.      Mouth/Throat:      Mouth: Mucous membranes are moist.      Pharynx: Oropharynx is clear.   Eyes:      Conjunctiva/sclera: Conjunctivae normal.      Pupils: Pupils are equal, round, and reactive to light.   Pulmonary:      Effort: Pulmonary effort is normal.   Skin:     General: Skin is warm and dry.      Findings: No rash.   Neurological:      General: No focal deficit present.      Mental Status: He is alert and oriented to person, place, and time. Mental status is at baseline.      Motor: No weakness.   Psychiatric:         Mood and Affect: Mood normal.         Behavior: Behavior normal.         Thought Content: Thought content normal.         Judgment: Judgment normal.           NEEDS ASSESSMENTS    Genetics  The patient's new diagnosis and family history have been reviewed  for genetic counseling needs. A genetic referral is not recommended.     Psychosocial and Barriers to care  The patient has completed a PHQ-9 Depression Screening and the Distress Thermometer (DT) today.  PHQ-9 results show PHQ-2 Total Score: 0 PHQ-9 Total Score: PHQ-9 Total Score: 0     The patient scored their distress today as Distress Level: 1 on a scale of 0-10 with 0 being no distress and 10 being extreme distress. Problems marked by the patient as being an issue for them within the last week include   .      Results were reviewed along with psychosocial resources offered by our cancer center.  Our Supportive Oncology team will be flagged for a score of 4 or above, and a same day call will be made for a score of 9 or 10.  A mental health referral is offered at that time. Patients who score less than 4 have been educated on our support services and can be referred to our  upon request.  The patient will not be referred to our .       Nutrition  The patient has completed the malnutrition screening today. They scored Malnutrition Screening Tool  Have you recently lost weight without trying?  If yes, how much weight have you lost?: 0--> No  Have you been eating poorly because of a decreased appetite?: 0--> No  MST score: 0   with a score of 0-1 meaning not at risk in a score of 2 or greater meaning at risk.  Patients with a score of 3 or higher will be referred to our oncology dietitian for support. Patients beginning at risk treatment regimens or who have dietary concerns will also be referred to our oncology dietitian. The patient will not be referred.    Functional Assessment  Persons who are age 70 or greater will be screened for qualification of a comprehensive geriatric assessment by our survivorship nurse practitioner.  Older adults with cancer face unique challenges. These may include an increased risk of drug reactions, financial burdens, and caregiver stress. The patient scored  G8 Questionnaire  Has food intake declined over the past 3 months due to loss of appetite, digestive problems, chewing or swallowing difficulties?: No decrease in food intake  Weight loss during the last 3 months: No weight loss  Mobility: Goes out  Neuropsychological Problems: No psychological problems  Body Mass Index (BMI (weight in kg) / (height in m2)): BMI 23 and > 23  Takes more than 3 medications a day: Yes, takes more than 3 prescription drugs per day  In comparison with other people of the same age, how does the patient consider his/her health status?: As good  Age: > 85  Total Score: 13 . Patients scoring 14 or lower will referred for an older adult functional assessment with the survivorship advanced practice registered nurse to ensure all needed support is provided as patients plan for their treatments. NOT APPLICABLE    Intravenous Access Assessment  The patient and I discussed planned intravenous chemo/biotherapy as well as other IV treatments that are often needed throughout the course of treatment. These may include, but are not limited to blood transfusions, antibiotics, and IV hydration. Discussed that depending on selected treatment and vein assessment, patient may require venous access device (VAD) which could include but not limited to a Mediport or PICC line. Risks and benefits of VADs reviewed. The patient will be treated via PIV.    Reproductive/Sexual Activity   People should avoid becoming pregnant and should not get a partner pregnant while undergoing chemo/biotherapy.  People of childbearing age should use effective contraception during active therapy. The best recommendation for all people is to use a barrier method for a minimum of 1 week after the last infusion of chemo/biotherapy to prevent your partner being exposed to byproducts from treatment medications in bodily fluids. Effective contraception should be discussed with your oncology team to make sure it is safe to take based on  "your diagnosis. Possible options include oral contraceptives, barrier methods. Chemo/biotherapy can change your ability to reproduce children in the future.  There are options for fertility preservation. The patient will not be referred.    Advanced Care Planning  Advance Care Planning   The patient and I discussed advanced care planning, \"Conversations that Matter\".   This service is offered for development of advance directives with a certified ACP facilitator.  The patient does not have an up-to-date advanced directive. This document is not on file with our office. The patient is not interested in an appointment with one of our facilitators to create or update their advanced directives.     Smoking cessation  Tobacco Use: Low Risk    • Smoking Tobacco Use: Never   • Smokeless Tobacco Use: Never   • Passive Exposure: Never       Patient and I discussed their tobacco use history. Referral will not be made for smoking cessation.      Palliative Care  When appropriate, the patient and I discussed the availability palliative care services and when appropriate Hospice care. Palliative care is not the same as Hospice care which was explained to the patient.  The patient is not interested in additional information from our  on these services.     Survivorship   When appropriate, we discussed that we will refer the patient to survivorship clinic to discuss next steps following completion of planned treatment.  Reviewed this visit will include assessment of your physical, psychological, functional, and spiritual needs as a survivor and the need at attend this visit when scheduled.    Assessment and Plan:    Diagnoses and all orders for this visit:    1. Cancer of ascending colon (HCC) (Primary)      No orders of the defined types were placed in this encounter.    1. Needs assessment was completed where applicable including genetics, psychosocial needs, barriers to care, VAD evaluation, advanced care planning, " survivorship, and palliative care services where indicated. Referrals have been ordered as appropriate based upon evaluation today and patient desires.   2. Adequate time was given to answer questions.  Patient made aware of their care team members and contact information if they have questions or problems throughout the treatment course.  3. Discussion held and written information provided describing frequency of office visits and ongoing monitoring throughout the treatment plan.     4. Reviewed with patient any prescribed medication sent to pharmacy.  Education provided regarding proper storage, safe handling, and proper disposal of unused medication.  5. Proper handling of body fluids and waste discussed and written information provided.  6. If appropriate, patient had pretreatment labs drawn today.    Learning assessment completed at initial patient encounter. See separate flowsheet.     I spent 12 minutes caring for Damián on this date of service. This time includes time spent by me in the following activities: preparing for the visit, obtaining and/or reviewing a separately obtained history, counseling and educating the patient/family/caregiver and documenting information in the medical record.     Lisa Hernandez, ROSEMARIE   12/13/22

## 2022-12-13 NOTE — PROGRESS NOTES
[facility] Hematology/Oncology Treatment Plan Summary    Name: Damián Diaz  [facility]# [id number]  MD: Dr. Ansari    Diagnosis: Colon cancer Stage: 3    Goal of chemotherapy: adjuvant and curative intent    Treatment Medication(s) / Frequency and Dosing    CAPEOX x 3 months   1. Xeloda (Capecitabine) 2150 mg (four 500 mg tablets, one 150 mg tablet) PO BID x 14 days on, 7 days off then repeat   2. Oxaliplatin 340 mg IV every 21 days     Number of cycles: 4    Starting on: 12/27/22     Follow-up Testing to be determined after TBD cycles by MD.     Items for home use: Imodium AD (for diarrhea), Acetaminophen or Tylenol (for fever and/or pain) and Aquaphor 2-3 times a day, pepcid 20 mg BID     Rx written for: [x] Nausea    [] Pre-Chemo   ondansetron 8 mg by mouth every 8 hours as needed for nausea   Pre-medications for oxaliplatin (nausea): ondansetron 16 mg IV bag over 15 minutes + dexamethasone 12 mg IV over 15 minutes        Completing Pharmacist: Ann Arguello, Pharmacy Intern             Date/time: 12/13/2022 14:20 EST    Please note: You will be seen by a provider frequently with your treatment plan. This plan may change depending on many factors, if so, this will be discussed with you by your physician.  Last update 12/2020.       Counseling Provided:  - Side effects reviewed with patient including: decreased WBC/increased risk for infection , decreased platelets/increased risk for bleed, decreased hemoglobin, fatigue, nausea/vomiting, diarrhea, mouth sores/irritation, hand-foot syndrome , changes in kidney function, changes in liver function, peripheral neuropathy and cardiotoxicity. Additional side effects covered in written handout.   - Reviewed proper administration: Discussed if the patient is handling their own medications, then they need to wash their hands properly after touching the medication. If a caregiver is assisting with handling medication, need to wear disposal gloves and wash hands properly  afterwards. Patient was counseled to take Xeloda twice daily with water, within 30 minutes of a meal, at the same time everyday. Additional precautions: none  - Provided information on prior storage of medication: Store medication safe away from children and pets, away from light, and at room temperature. If you use a pill box, use a separate pill box from other medication.   - Provided information on safe handling of soiled linen and proper flush technique and reviewed proper disposal of medication.   - Reviewed what to do in the event of a missed dose: Do not take an extra dose or two doses at one time. Simply take your next dose at the regularly scheduled time.  - Reviewed expected goals/outcomes, contraindications and safety precautions, including when to seek medical care.  - Provided information on pregnancy considerations - women should not become pregnant and men should not get a partner pregnant while taking; men and women of childbearing age and potential should use effective contraception during and after therapy.    Provided patient with:   Chemo calendar to help improve adherence., Education sheets about the medication, 24-hour clinic phone number and my contact information and instructions to call should additional questions arise.     Completed medication reconciliation today to assess for drug interactions.   Reviewed concomitant medications, allergies, labs, comorbidities/medical history, and immunization history.   Pt has stopped benzonatate, stahist AD, fluticasone, duo nebs, promethazine, and tetracycline.   Drug-drug interactions noted: Level C DDI with protonix and capecitabine - may decrease efficacy of capecitabine. Advised pt to try pepcid 20 mg BID on the weeks of capecitabine if possible.  Advised pt to call the clinic if any new medications are started so we can assess for drug-drug interactions     Wrap-up:  Discussed aforementioned material with patient in person, face-to-face, in  clinic.   Chemo consents/CCA will need to be signed at next visit to office on 12/27/22.   Medication availability: patient will receive medication from [facility] pharmacy on: by 12/27/22  Patient and pt's sister, present in today's appointment, expressed understanding.  Patient demonstrates ability to self-administer medication. No barriers to adherence identified.  All questions and concerns addressed.     Ann Arguello, Pharmacy Intern  12/13/2022  14:20 EST

## 2022-12-19 ENCOUNTER — SPECIALTY PHARMACY (OUTPATIENT)
Dept: PHARMACY | Facility: HOSPITAL | Age: 57
End: 2022-12-19

## 2022-12-20 ENCOUNTER — HOSPITAL ENCOUNTER (OUTPATIENT)
Dept: CT IMAGING | Facility: HOSPITAL | Age: 57
Discharge: HOME | End: 2022-12-20
Admitting: INTERNAL MEDICINE

## 2022-12-20 DIAGNOSIS — C18.2 CANCER OF ASCENDING COLON: ICD-10-CM

## 2022-12-20 PROCEDURE — 0 IOPAMIDOL PER 1 ML: Performed by: INTERNAL MEDICINE

## 2022-12-20 PROCEDURE — 71260 CT THORAX DX C+: CPT

## 2022-12-20 RX ADMIN — IOPAMIDOL 100 ML: 755 INJECTION, SOLUTION INTRAVENOUS at 15:45

## 2022-12-21 ENCOUNTER — SPECIALTY PHARMACY (OUTPATIENT)
Dept: PHARMACY | Facility: HOSPITAL | Age: 57
End: 2022-12-21

## 2022-12-21 NOTE — PROGRESS NOTES
Specialty Pharmacy Initial Fill Coordination Note     Damián is a 57 y.o. male contacted today regarding the initial fill of  CAPECITABINE specialty medication(s).    Reviewed and verified with patient:       Specialty medication(s) and dose(s) confirmed: yes        Delivery Questions    Flowsheet Row Most Recent Value   Delivery method Other (Comment)  [Pls [facility] to [facility] by 12/26 for a 12/27 . $0 co-pay.]   Delivery address correct? Yes   Delivery phone number [phone]   Number of medications in delivery 2   Medication being filled and delivered TWO STRENGTHS OF CAPECITABINE   Doses left of specialty medications N/A - NEW START   Is there any medication that is due not being filled? No   Supplies needed? No supplies needed   Cooler needed? No   Do any medications need mixed or dated? No   Copay form of payment Payment plan already set up   Additional comments N/A   Questions or concerns for the pharmacist? No   Explain any questions or concerns for the pharmacist N/A   Are any medications first time fills? No                 Follow-up: 21 day(s)     Genna Bennett, Pharmacy Technician  Specialty Pharmacy Technician

## 2022-12-23 NOTE — PROGRESS NOTES
.     REASON FOR FOLLOWUP :   Resected colon cancer, iron deficiency anemia    HISTORY OF PRESENT ILLNESS:  The patient is a 57 y.o. year old male  who is here for follow-up with the above-mentioned history.    Is here today with tentative plans to begin chemotherapy.  He has no new problems.  No problems eating.  No complaints of neuropathy or pain or nausea        Past Medical History:   Diagnosis Date   • Chronic cough     SINCE COVID DIAGNOSIS 9/2021   • Colon cancer (HCC) 11/03/2022    Ascending colon invasive moderately differentiated adenocarcinoma   • Colon polyps 11/03/2022    Transverse colon: fragments of tubular adenoma, rectum: fragments of tubular adenoma and hyperplastic polyp   • History of COVID-19 09/2021   • Hypertension    • Iron deficiency anemia      Past Surgical History:   Procedure Laterality Date   • CHOLECYSTECTOMY WITH INTRAOPERATIVE CHOLANGIOGRAM N/A 11/11/2022    Procedure: LAPAROSCOPIC CHOLECYSTECTOMY;  Surgeon: Nigel Tolentino MD;  Location: [facility];  Service: General;  Laterality: N/A;   • COLON RESECTION Right 11/11/2022    Procedure: COLON RESECTION RIGHT;  Surgeon: Nigel Tolentino MD;  Location: [facility] OR;  Service: General;  Laterality: Right;   • COLONOSCOPY N/A 11/03/2022    Procedure: COLONOSCOPY into cecum with tattoo ink injection, bx's and cold bx/snare polypectomies;  Surgeon: Anup Oconnell MD;  Location: [facility];  Service: Gastroenterology;  Laterality: N/A;  pre: iron def anemia, heme positive stool  post: polyps, colon mass   • ENDOSCOPY N/A 11/03/2022    Procedure: ESOPHAGOGASTRODUODENOSCOPYr with bx;  Surgeon: Anup Oconnell MD;  Location: [facility];  Service: Gastroenterology;  Laterality: N/A;  pre:  iron def anemia, heme positive stool  post: gastritis    • INGUINAL HERNIA REPAIR Bilateral 1970   • LACERATION REPAIR Right     THUMB/ HAND       MEDICATIONS    Current Outpatient Medications:   •  amLODIPine (NORVASC) 5 MG  tablet, Take 1 tablet by mouth Daily., Disp: , Rfl:   •  Bismuth 262 MG chewable tablet, Chew 2 tablets 4 (Four) Times a Day., Disp: 112 tablet, Rfl: 0  •  capecitabine (XELODA) 150 MG chemo tablet, Take 1 tablet by mouth 2 (Two) Times a Day with 1 other capecitabine prescription for 2,150 mg total. Take for 14 days on, then off for 7 days., Disp: 28 tablet, Rfl: 3  •  capecitabine (XELODA) 500 MG chemo tablet, Take 4 tablets by mouth 2 (Two) Times a Day with 1 other capecitabine prescription for 2,150 mg total. Take for 14 days on, then off for 7 days., Disp: 112 tablet, Rfl: 3  •  Chlorcyclizine-Pseudoephed (Stahist AD) 25-60 MG tablet, Take 1 tablet by mouth As Needed., Disp: , Rfl:   •  ferrous sulfate 325 (65 FE) MG tablet, Take 1 tablet by mouth Daily With Breakfast., Disp: , Rfl:   •  fluticasone (FLONASE) 50 MCG/ACT nasal spray, 2 sprays into the nostril(s) as directed by provider As Needed., Disp: , Rfl:   •  meloxicam (MOBIC) 7.5 MG tablet, Take 1 tablet by mouth Daily., Disp: , Rfl:   •  ondansetron (Zofran) 4 MG tablet, Take 1 tablet by mouth Every 6 (Six) Hours As Needed for Nausea or Vomiting., Disp: 10 tablet, Rfl: 1  •  pantoprazole (PROTONIX) 40 MG EC tablet, Take 1 tablet by mouth Daily., Disp: , Rfl:   •  tetracycline (ACHROMYCIN,SUMYCIN) 500 MG capsule, Take 1 capsule by mouth 4 (Four) Times a Day., Disp: 56 capsule, Rfl: 0  No current facility-administered medications for this visit.    Facility-Administered Medications Ordered in Other Visits:   •  dexamethasone (DECADRON) IVPB 12 mg, 12 mg, Intravenous, Once, Jesus Ansari II, MD  •  dextrose (D5W) 5 % infusion 250 mL, 250 mL, Intravenous, Once, Jesus Ansari II, MD  •  ondansetron (ZOFRAN) 16 mg/50mL NS IVPB, 16 mg, Intravenous, Once, Jesus Ansari II, MD  •  OXALIplatin (ELOXATIN) 340 mg in dextrose (D5W) 5 % 318 mL chemo IVPB, 130 mg/m2 (Treatment Plan Recorded), Intravenous, Once, Code, Jesus AREVALO II, MD    ALLERGIES:   No Known  "Allergies    SOCIAL HISTORY:       Social History     Socioeconomic History   • Marital status: Single   Tobacco Use   • Smoking status: Never     Passive exposure: Never   • Smokeless tobacco: Never   Vaping Use   • Vaping Use: Never used   Substance and Sexual Activity   • Alcohol use: Yes     Comment: rarely   • Drug use: Never   • Sexual activity: Defer         FAMILY HISTORY:  Family History   Problem Relation Age of Onset   • Heart failure Mother    • Clotting disorder Father         DVT   • Hypertension Brother    • Colon cancer Neg Hx    • Crohn's disease Neg Hx    • Colon polyps Neg Hx    • Irritable bowel syndrome Neg Hx    • Ulcerative colitis Neg Hx    • Malig Hyperthermia Neg Hx        REVIEW OF SYSTEMS:  Review of Systems   Constitutional: Negative for activity change.   HENT: Negative for nosebleeds and trouble swallowing.    Respiratory: Negative for shortness of breath and wheezing.    Cardiovascular: Negative for chest pain and palpitations.   Gastrointestinal: Negative for diarrhea and nausea.   Genitourinary: Negative for dysuria and hematuria.   Musculoskeletal: Negative for arthralgias and myalgias.   Neurological: Negative for seizures and syncope.   Hematological: Negative for adenopathy. Does not bruise/bleed easily.   Psychiatric/Behavioral: Negative for confusion.              Vitals:    12/27/22 0820   BP: 154/96  Comment: NO MEDS THIS AM   Pulse: 92   Resp: 16   Temp: 97.5 °F (36.4 °C)   TempSrc: Temporal   SpO2: 96%   Weight: (!) 144 kg (316 lb 12.8 oz)   Height: 188 cm (74.02\")   PainSc: 0-No pain     Current Status 12/27/2022   ECOG score 0      PHYSICAL EXAM:        CONSTITUTIONAL:  Vital signs reviewed.  No distress, looks comfortable.  EYES:  Conjunctiva and lids unremarkable.  PERRLA  EARS,NOSE,MOUTH,THROAT:  Ears and nose appear unremarkable.  Lips, teeth, gums appear unremarkable.  RESPIRATORY:  Normal respiratory effort.  Lungs clear to auscultation " bilaterally.  CARDIOVASCULAR:  Normal S1, S2.  No murmurs rubs or gallops.  No significant lower extremity edema.  GASTROINTESTINAL: Abdomen appears unremarkable.  Nontender.  No hepatomegaly.  No splenomegaly.  LYMPHATIC:  No cervical, supraclavicular, axillary lymphadenopathy.  SKIN:  Warm.  No rashes.  PSYCHIATRIC:  Normal judgment and insight.  Normal mood and affect.         RECENT LABS:        WBC   Date Value Ref Range Status   12/27/2022 8.46 3.40 - 10.80 10*3/mm3 Final   12/05/2022 6.39 3.40 - 10.80 10*3/mm3 Final   11/29/2022 4.09 3.40 - 10.80 10*3/mm3 Final   11/12/2022 13.53 (H) 3.40 - 10.80 10*3/mm3 Final   11/07/2022 7.22 3.40 - 10.80 10*3/mm3 Final   10/20/2022 6.24 3.40 - 10.80 10*3/mm3 Final     Hemoglobin   Date Value Ref Range Status   12/27/2022 11.9 (L) 13.0 - 17.7 g/dL Final   12/05/2022 11.1 (L) 13.0 - 17.7 g/dL Final   11/29/2022 10.1 (L) 13.0 - 17.7 g/dL Final   11/12/2022 8.2 (L) 13.0 - 17.7 g/dL Final   11/07/2022 8.4 (L) 13.0 - 17.7 g/dL Final   10/20/2022 9.0 (L) 13.0 - 17.7 g/dL Final     Platelets   Date Value Ref Range Status   12/27/2022 285 140 - 450 10*3/mm3 Final   12/05/2022 299 140 - 450 10*3/mm3 Final   11/29/2022 327 140 - 450 10*3/mm3 Final   11/12/2022 321 140 - 450 10*3/mm3 Final   11/07/2022 330 140 - 450 10*3/mm3 Final   10/20/2022 327 140 - 450 10*3/mm3 Final       Assessment & Plan   There are no diagnoses linked to this encounter.      Damián Diaz   *Ascending colon adenocarcinoma  · Discovered on colonoscopy 11/3/2022, Dr. Oconnell, for MINA work-up.  Invasive moderately differentiated adenocarcinoma.  · CT AP 11/7/2022: Circumferential malignancy ascending colon with adjacent mesenteric LAD.  4 mm RML nodule stable from 6/8/2022.  Radiologist felt likely benign but recommended continued follow-up.  Granulomatous calcifications both lower lobes.  · Resection 11/11/2022, Dr. Tolentino: Moderately differentiated adenocarcinoma of cecum, 5.2 cm.  Grade 2.  Invades through  muscularis propria into pericolic fat.  Margins negative.  No perineural invasion or lymphovascular invasion.  One of the 30 nodes positive.  · [id number], stage 3  · No deficiency of MMR proteins.  (pMMR) (consistent with STEFANIA)  · Per NCCN guidelines: Low risk stage III colon cancer preferred regimens are Capeox x3 months versus FOLFOX x3 to 6 months.  Reviewed these options with the patient.  He has chosen Capeox x3 months  · Adjuvant CAPEOX x4 cycles starts 12/27/22  · Oxaliplatin D1.  Xeloda 850 mg/m2 per dose (2150 mg rounding for tablet strength), twice per day, D1-14/21 days.  Plan 4 cycles total, which is 3 months of therapy.  · CT chest 12/20/2022: Stable 4 mm RML nodule favored to be a noncalcified granuloma as there is evidence of granulomatous disease elsewhere in the chest.  Could not rule out malignancy.  Radiologist recommended follow-up    *RML pulmonary nodule  · Although only seen in hindsight, first seen on CT 6/8/2022, per CT chest 12/20/2022, when it was read as unchanged from 6/8/2022    *Iron deficiency anemia  · 6/21/2022: Hb 7.3.  Oral iron daily started.  · Patient states early October 2022 Hb around 8.  · 10/20/2022 (initial consult with me): Ferritin 9.  3% saturation.  Hb 9.  Patient states he cannot tolerate oral iron anymore due to constipation and abdominal cramping.    · Insurance requires Venofer instead of Injectafer  · Completed 1000 mg Venofer on 11/29/2022.  · 12/5/2022: Ferritin 124, 39% saturation.  · 12/27/2022: Hb 11.9    *Source of iron deficiency  · Colon cancer found and resected on 11/11/2022    *Microcytosis, likely due to iron deficiency  MCV 77.1, from 78.8    *Lightheadedness, dyspnea on exertion, fatigue.  Hopefully this will improve with IV iron.    *Class III obesity.  Being overweight can lead to cytopenias through hepatic steatosis.    Body mass index is 40.66 kg/m².  BMI 25 to <30 is overweight  BMI 30 to <35 is class 1 obesity  BMI 35 to <40 is class 2  obesity  BMI 40 or higher is class 3 obesity   Remains overweight.  Ideally, lose weight    Plan  · CAPEOX x4 cycles (every 3 weeks), to begin 12/27/2022  · Appointment made for all 4 cycles including a 2-week appointment after c1d1 (through 2/28/2023)

## 2022-12-27 ENCOUNTER — INFUSION (OUTPATIENT)
Dept: ONCOLOGY | Facility: HOSPITAL | Age: 57
End: 2022-12-27
Payer: MEDICAID

## 2022-12-27 ENCOUNTER — OFFICE VISIT (OUTPATIENT)
Dept: ONCOLOGY | Facility: CLINIC | Age: 57
End: 2022-12-27

## 2022-12-27 ENCOUNTER — DOCUMENTATION (OUTPATIENT)
Dept: PHARMACY | Facility: HOSPITAL | Age: 57
End: 2022-12-27

## 2022-12-27 VITALS
BODY MASS INDEX: 40.43 KG/M2 | SYSTOLIC BLOOD PRESSURE: 154 MMHG | WEIGHT: 315 LBS | HEIGHT: 74 IN | DIASTOLIC BLOOD PRESSURE: 96 MMHG | OXYGEN SATURATION: 96 % | TEMPERATURE: 97.5 F | RESPIRATION RATE: 16 BRPM | HEART RATE: 92 BPM

## 2022-12-27 DIAGNOSIS — C18.2 CANCER OF ASCENDING COLON: Primary | ICD-10-CM

## 2022-12-27 DIAGNOSIS — C18.2 CANCER OF ASCENDING COLON: ICD-10-CM

## 2022-12-27 LAB
ALBUMIN SERPL-MCNC: 4 G/DL (ref 3.5–5.2)
ALBUMIN/GLOB SERPL: 1.2 G/DL
ALP SERPL-CCNC: 70 U/L (ref 39–117)
ALT SERPL W P-5'-P-CCNC: 19 U/L (ref 1–41)
ANION GAP SERPL CALCULATED.3IONS-SCNC: 9.4 MMOL/L (ref 5–15)
AST SERPL-CCNC: 17 U/L (ref 1–40)
BASOPHILS # BLD AUTO: 0.03 10*3/MM3 (ref 0–0.2)
BASOPHILS NFR BLD AUTO: 0.4 % (ref 0–1.5)
BILIRUB SERPL-MCNC: 0.2 MG/DL (ref 0–1.2)
BUN SERPL-MCNC: 11 MG/DL (ref 6–20)
BUN/CREAT SERPL: 12.8 (ref 7–25)
CALCIUM SPEC-SCNC: 9.4 MG/DL (ref 8.6–10.5)
CHLORIDE SERPL-SCNC: 107 MMOL/L (ref 98–107)
CO2 SERPL-SCNC: 24.6 MMOL/L (ref 22–29)
CREAT SERPL-MCNC: 0.86 MG/DL (ref 0.76–1.27)
DEPRECATED RDW RBC AUTO: 55.8 FL (ref 37–54)
EGFRCR SERPLBLD CKD-EPI 2021: 102.3 ML/MIN/1.73
EOSINOPHIL # BLD AUTO: 0.32 10*3/MM3 (ref 0–0.4)
EOSINOPHIL NFR BLD AUTO: 3.8 % (ref 0.3–6.2)
ERYTHROCYTE [DISTWIDTH] IN BLOOD BY AUTOMATED COUNT: 20.4 % (ref 12.3–15.4)
GLOBULIN UR ELPH-MCNC: 3.3 GM/DL
GLUCOSE SERPL-MCNC: 163 MG/DL (ref 65–99)
HCT VFR BLD AUTO: 41.4 % (ref 37.5–51)
HGB BLD-MCNC: 11.9 G/DL (ref 13–17.7)
IMM GRANULOCYTES # BLD AUTO: 0.03 10*3/MM3 (ref 0–0.05)
IMM GRANULOCYTES NFR BLD AUTO: 0.4 % (ref 0–0.5)
LYMPHOCYTES # BLD AUTO: 3.17 10*3/MM3 (ref 0.7–3.1)
LYMPHOCYTES NFR BLD AUTO: 37.5 % (ref 19.6–45.3)
MCH RBC QN AUTO: 22.2 PG (ref 26.6–33)
MCHC RBC AUTO-ENTMCNC: 28.7 G/DL (ref 31.5–35.7)
MCV RBC AUTO: 77.1 FL (ref 79–97)
MONOCYTES # BLD AUTO: 0.51 10*3/MM3 (ref 0.1–0.9)
MONOCYTES NFR BLD AUTO: 6 % (ref 5–12)
NEUTROPHILS NFR BLD AUTO: 4.4 10*3/MM3 (ref 1.7–7)
NEUTROPHILS NFR BLD AUTO: 51.9 % (ref 42.7–76)
NRBC BLD AUTO-RTO: 0 /100 WBC (ref 0–0.2)
PLATELET # BLD AUTO: 285 10*3/MM3 (ref 140–450)
PMV BLD AUTO: 8.8 FL (ref 6–12)
POTASSIUM SERPL-SCNC: 3.8 MMOL/L (ref 3.5–5.2)
PROT SERPL-MCNC: 7.3 G/DL (ref 6–8.5)
RBC # BLD AUTO: 5.37 10*6/MM3 (ref 4.14–5.8)
SODIUM SERPL-SCNC: 141 MMOL/L (ref 136–145)
WBC NRBC COR # BLD: 8.46 10*3/MM3 (ref 3.4–10.8)

## 2022-12-27 PROCEDURE — 25010000002 DEXAMETHASONE SODIUM PHOSPHATE 100 MG/10ML SOLUTION: Performed by: INTERNAL MEDICINE

## 2022-12-27 PROCEDURE — 96415 CHEMO IV INFUSION ADDL HR: CPT

## 2022-12-27 PROCEDURE — 25010000002 ONDANSETRON PER 1 MG: Performed by: INTERNAL MEDICINE

## 2022-12-27 PROCEDURE — 25010000002 OXALIPLATIN PER 0.5 MG: Performed by: INTERNAL MEDICINE

## 2022-12-27 PROCEDURE — 85025 COMPLETE CBC W/AUTO DIFF WBC: CPT | Performed by: INTERNAL MEDICINE

## 2022-12-27 PROCEDURE — 99214 OFFICE O/P EST MOD 30 MIN: CPT | Performed by: INTERNAL MEDICINE

## 2022-12-27 PROCEDURE — 96375 TX/PRO/DX INJ NEW DRUG ADDON: CPT

## 2022-12-27 PROCEDURE — 80053 COMPREHEN METABOLIC PANEL: CPT | Performed by: INTERNAL MEDICINE

## 2022-12-27 PROCEDURE — 96413 CHEMO IV INFUSION 1 HR: CPT

## 2022-12-27 RX ORDER — DIPHENHYDRAMINE HYDROCHLORIDE 50 MG/ML
50 INJECTION INTRAMUSCULAR; INTRAVENOUS AS NEEDED
Status: CANCELLED | OUTPATIENT
Start: 2022-12-27

## 2022-12-27 RX ORDER — DEXTROSE MONOHYDRATE 50 MG/ML
250 INJECTION, SOLUTION INTRAVENOUS ONCE
Status: CANCELLED | OUTPATIENT
Start: 2022-12-27

## 2022-12-27 RX ORDER — DEXTROSE MONOHYDRATE 50 MG/ML
250 INJECTION, SOLUTION INTRAVENOUS ONCE
Status: COMPLETED | OUTPATIENT
Start: 2022-12-27 | End: 2022-12-27

## 2022-12-27 RX ORDER — FAMOTIDINE 10 MG/ML
20 INJECTION, SOLUTION INTRAVENOUS AS NEEDED
Status: CANCELLED | OUTPATIENT
Start: 2022-12-27

## 2022-12-27 RX ADMIN — DEXTROSE MONOHYDRATE 250 ML: 50 INJECTION, SOLUTION INTRAVENOUS at 09:21

## 2022-12-27 RX ADMIN — ONDANSETRON 16 MG: 2 INJECTION INTRAMUSCULAR; INTRAVENOUS at 09:21

## 2022-12-27 RX ADMIN — DEXAMETHASONE SODIUM PHOSPHATE 12 MG: 100 INJECTION INTRAMUSCULAR; INTRAVENOUS at 09:40

## 2022-12-27 RX ADMIN — OXALIPLATIN 340 MG: 100 INJECTION, SOLUTION, CONCENTRATE INTRAVENOUS at 10:06

## 2022-12-27 NOTE — PROGRESS NOTES
I have received Capecitabine (2 strengths) from [facility] and placed it in the [facility] at .    I have given directly to patient after received from [facility].

## 2023-01-03 ENCOUNTER — SPECIALTY PHARMACY (OUTPATIENT)
Dept: PHARMACY | Facility: HOSPITAL | Age: 58
End: 2023-01-03
Payer: MEDICAID

## 2023-01-03 NOTE — PROGRESS NOTES
MTM telephone encounter re:adherence and side effects (Xeloda)     Damián reports starting Xeloda 2150 mg by mouth twice daily on 12/27/22. Thus far, patient denies side effects, aside from nausea and loose stools, which is manageable and not bothersome to patient. Advised patient to alert office if this changes. . Thus far, patient has not missed doses and denies medication changes. Advised pt to call office if any changes. Patient expressed understanding and had no additional questions at this time.       Cecilia Orr, Pharmacy Intern  1/3/2023  10:30 EST

## 2023-01-05 ENCOUNTER — TELEPHONE (OUTPATIENT)
Dept: ONCOLOGY | Facility: CLINIC | Age: 58
End: 2023-01-05
Payer: MEDICAID

## 2023-01-05 RX ORDER — ONDANSETRON 4 MG/1
4 TABLET, FILM COATED ORAL EVERY 6 HOURS PRN
Qty: 30 TABLET | Refills: 1 | Status: SHIPPED | OUTPATIENT
Start: 2023-01-05

## 2023-01-05 NOTE — TELEPHONE ENCOUNTER
DELETE AFTER REVIEWING: Send the encounter HIGH priority, If patient has less than a 3 day supply. If the patient will run out of medication over the weekend add that information to the additional details line. Send this encounter to the clinical pool.    Caller: Emily Damián R    Relationship: [facility]    Best call back number: [phone]    Requested Prescriptions:   Requested Prescriptions     Pending Prescriptions Disp Refills   • ondansetron (Zofran) 4 MG tablet 10 tablet 1     Sig: Take 1 tablet by mouth Every 6 (Six) Hours As Needed for Nausea or Vomiting.        Pharmacy where request should be sent: [address] - [phone]  - [phone] FX     Additional details provided by patient: PATIENT IS OUT. REQUESTING 30 DAY QUANTITY    Does the patient have less than a 3 day supply:  [x] Yes  [] No    Would you like a call back once the refill request has been completed: [x] Yes [] No    If the office needs to give you a call back, can they leave a voicemail: [x] Yes [] No    Terry Murcia Rep   01/05/23 11:37 EST

## 2023-01-09 NOTE — PROGRESS NOTES
.     REASON FOR FOLLOWUP :   Resected colon cancer, iron deficiency anemia    HISTORY OF PRESENT ILLNESS:  The patient is a 57 y.o. year old male  who is here for follow-up with the above-mentioned history.    Continues on CAPEOX, taking Xeloda days 1-14/21 days.  He took his last dose of Xeloda this morning.  He did note some nausea with the Xeloda, however found that if he ate more prior to taking the medication he did not experience as much nausea.  He did take occasional Zofran with relief.  No emesis.  Eating and drinking adequately.  Bowels moving regularly.  Is experiencing the expected cold sensitivity, no signs or symptoms of peripheral neuropathy.  Denies fever or chills.  Denies new or worsening pain.  No new concerns today.    Of note, his blood pressure is elevated at 157/103.  He admits he has not yet taken his blood pressure medication this morning as he was trying to get to his appointment on time.  He will take his blood pressure medication as soon as he arrives home.  He denies dizziness, vision changes, or chest pain.    Past Medical History:   Diagnosis Date   • Chronic cough     SINCE COVID DIAGNOSIS 9/2021   • Colon cancer (HCC) 11/03/2022    Ascending colon invasive moderately differentiated adenocarcinoma   • Colon polyps 11/03/2022    Transverse colon: fragments of tubular adenoma, rectum: fragments of tubular adenoma and hyperplastic polyp   • History of COVID-19 09/2021   • Hypertension    • Iron deficiency anemia      Past Surgical History:   Procedure Laterality Date   • CHOLECYSTECTOMY WITH INTRAOPERATIVE CHOLANGIOGRAM N/A 11/11/2022    Procedure: LAPAROSCOPIC CHOLECYSTECTOMY;  Surgeon: Nigel Tolentino MD;  Location: [facility];  Service: General;  Laterality: N/A;   • COLON RESECTION Right 11/11/2022    Procedure: COLON RESECTION RIGHT;  Surgeon: Nigel Tolentino MD;  Location: [facility] OR;  Service: General;  Laterality: Right;   • COLONOSCOPY N/A 11/03/2022     Procedure: COLONOSCOPY into cecum with tattoo ink injection, bx's and cold bx/snare polypectomies;  Surgeon: Anup Oconnell MD;  Location: [facility] ENDOSCOPY;  Service: Gastroenterology;  Laterality: N/A;  pre: iron def anemia, heme positive stool  post: polyps, colon mass   • ENDOSCOPY N/A 11/03/2022    Procedure: ESOPHAGOGASTRODUODENOSCOPYr with bx;  Surgeon: Anup Oconnell MD;  Location: [facility] ENDOSCOPY;  Service: Gastroenterology;  Laterality: N/A;  pre:  iron def anemia, heme positive stool  post: gastritis    • INGUINAL HERNIA REPAIR Bilateral 1970   • LACERATION REPAIR Right     THUMB/ HAND       MEDICATIONS    Current Outpatient Medications:   •  amLODIPine (NORVASC) 5 MG tablet, Take 1 tablet by mouth Daily., Disp: , Rfl:   •  Bismuth 262 MG chewable tablet, Chew 2 tablets 4 (Four) Times a Day., Disp: 112 tablet, Rfl: 0  •  capecitabine (XELODA) 150 MG chemo tablet, Take 1 tablet by mouth 2 (Two) Times a Day with 1 other capecitabine prescription for 2,150 mg total. Take for 14 days on, then off for 7 days., Disp: 28 tablet, Rfl: 3  •  capecitabine (XELODA) 500 MG chemo tablet, Take 4 tablets by mouth 2 (Two) Times a Day with 1 other capecitabine prescription for 2,150 mg total. Take for 14 days on, then off for 7 days., Disp: 112 tablet, Rfl: 3  •  Chlorcyclizine-Pseudoephed (Stahist AD) 25-60 MG tablet, Take 1 tablet by mouth As Needed., Disp: , Rfl:   •  ferrous sulfate 325 (65 FE) MG tablet, Take 1 tablet by mouth Daily With Breakfast., Disp: , Rfl:   •  fluticasone (FLONASE) 50 MCG/ACT nasal spray, 2 sprays into the nostril(s) as directed by provider As Needed., Disp: , Rfl:   •  meloxicam (MOBIC) 7.5 MG tablet, Take 1 tablet by mouth Daily., Disp: , Rfl:   •  ondansetron (Zofran) 4 MG tablet, Take 1 tablet by mouth Every 6 (Six) Hours As Needed for Nausea or Vomiting., Disp: 30 tablet, Rfl: 1  •  pantoprazole (PROTONIX) 40 MG EC tablet, Take 1 tablet by mouth Daily., Disp: , Rfl:   •   tetracycline (ACHROMYCIN,SUMYCIN) 500 MG capsule, Take 1 capsule by mouth 4 (Four) Times a Day., Disp: 56 capsule, Rfl: 0    ALLERGIES:   No Known Allergies    SOCIAL HISTORY:       Social History     Socioeconomic History   • Marital status: Single   Tobacco Use   • Smoking status: Never     Passive exposure: Never   • Smokeless tobacco: Never   Vaping Use   • Vaping Use: Never used   Substance and Sexual Activity   • Alcohol use: Yes     Comment: rarely   • Drug use: Never   • Sexual activity: Defer         FAMILY HISTORY:  Family History   Problem Relation Age of Onset   • Heart failure Mother    • Clotting disorder Father         DVT   • Hypertension Brother    • Colon cancer Neg Hx    • Crohn's disease Neg Hx    • Colon polyps Neg Hx    • Irritable bowel syndrome Neg Hx    • Ulcerative colitis Neg Hx    • Malig Hyperthermia Neg Hx      REVIEW OF SYSTEMS:  Review of Systems   Constitutional: Negative for activity change.   HENT: Negative for nosebleeds and trouble swallowing.    Respiratory: Negative for shortness of breath and wheezing.    Cardiovascular: Negative for chest pain and palpitations.   Gastrointestinal: Negative for diarrhea and nausea.   Genitourinary: Negative for dysuria and hematuria.   Musculoskeletal: Negative for arthralgias and myalgias.   Neurological: Negative for seizures and syncope.   Hematological: Negative for adenopathy. Does not bruise/bleed easily.   Psychiatric/Behavioral: Negative for confusion.          Vitals:    01/10/23 0842   BP: (!) 157/103  Comment: pt stated hasn't took BP meds yet for today   Pulse: 79   Resp: 18   Temp: 98.6 °F (37 °C)   TempSrc: Temporal   SpO2: 97%   Weight: (!) 144 kg (316 lb 11.2 oz)   Height: 188 cm (74.02\")   PainSc: 0-No pain     Current Status 1/10/2023   ECOG score 0      PHYSICAL EXAM:      CONSTITUTIONAL:  Vital signs reviewed.  No distress, looks comfortable.  EYES:  Conjunctiva and lids unremarkable.  PERRLA  EARS,NOSE,MOUTH,THROAT:  Ears  and nose appear unremarkable.  Lips, teeth, gums appear unremarkable.  RESPIRATORY:  Normal respiratory effort.  Lungs clear to auscultation bilaterally.  CARDIOVASCULAR:  Normal S1, S2.  No murmurs rubs or gallops.  No significant lower extremity edema.  GASTROINTESTINAL: Abdomen appears unremarkable.  Nontender.  No hepatomegaly.  No splenomegaly.  LYMPHATIC:  No cervical, supraclavicular, axillary lymphadenopathy.  SKIN:  Warm.  No rashes.  PSYCHIATRIC:  Normal judgment and insight.  Normal mood and affect.         RECENT LABS:        WBC   Date Value Ref Range Status   01/10/2023 5.67 3.40 - 10.80 10*3/mm3 Final   12/27/2022 8.46 3.40 - 10.80 10*3/mm3 Final   12/05/2022 6.39 3.40 - 10.80 10*3/mm3 Final   11/29/2022 4.09 3.40 - 10.80 10*3/mm3 Final   11/12/2022 13.53 (H) 3.40 - 10.80 10*3/mm3 Final   11/07/2022 7.22 3.40 - 10.80 10*3/mm3 Final   10/20/2022 6.24 3.40 - 10.80 10*3/mm3 Final     Hemoglobin   Date Value Ref Range Status   01/10/2023 12.2 (L) 13.0 - 17.7 g/dL Final   12/27/2022 11.9 (L) 13.0 - 17.7 g/dL Final   12/05/2022 11.1 (L) 13.0 - 17.7 g/dL Final   11/29/2022 10.1 (L) 13.0 - 17.7 g/dL Final   11/12/2022 8.2 (L) 13.0 - 17.7 g/dL Final   11/07/2022 8.4 (L) 13.0 - 17.7 g/dL Final   10/20/2022 9.0 (L) 13.0 - 17.7 g/dL Final     Platelets   Date Value Ref Range Status   01/10/2023 202 140 - 450 10*3/mm3 Final   12/27/2022 285 140 - 450 10*3/mm3 Final   12/05/2022 299 140 - 450 10*3/mm3 Final   11/29/2022 327 140 - 450 10*3/mm3 Final   11/12/2022 321 140 - 450 10*3/mm3 Final   11/07/2022 330 140 - 450 10*3/mm3 Final   10/20/2022 327 140 - 450 10*3/mm3 Final       Assessment & Plan   There are no diagnoses linked to this encounter.      Damián Diaz   *Ascending colon adenocarcinoma  · Discovered on colonoscopy 11/3/2022, Dr. Oconnell, for MINA work-up.  Invasive moderately differentiated adenocarcinoma.  · CT AP 11/7/2022: Circumferential malignancy ascending colon with adjacent mesenteric LAD.  4 mm  RML nodule stable from 6/8/2022.  Radiologist felt likely benign but recommended continued follow-up.  Granulomatous calcifications both lower lobes.  · Resection 11/11/2022, Dr. Tolentino: Moderately differentiated adenocarcinoma of cecum, 5.2 cm.  Grade 2.  Invades through muscularis propria into pericolic fat.  Margins negative.  No perineural invasion or lymphovascular invasion.  One of the 30 nodes positive.  · [id number], stage 3  · No deficiency of MMR proteins.  (pMMR) (consistent with STEFANIA)  · Per NCCN guidelines: Low risk stage III colon cancer preferred regimens are Capeox x3 months versus FOLFOX x3 to 6 months.  Reviewed these options with the patient.  He has chosen Capeox x3 months  · Adjuvant CAPEOX x4 cycles starts 12/27/22  · Oxaliplatin D1.  Xeloda 850 mg/m2 per dose (2150 mg rounding for tablet strength), twice per day, D1-14/21 days.  Plan 4 cycles total, which is 3 months of therapy.  · CT chest 12/20/2022: Stable 4 mm RML nodule favored to be a noncalcified granuloma as there is evidence of granulomatous disease elsewhere in the chest.  Could not rule out malignancy.  Radiologist recommended follow-up    *RML pulmonary nodule  · Although only seen in hindsight, first seen on CT 6/8/2022, per CT chest 12/20/2022, when it was read as unchanged from 6/8/2022    *Iron deficiency anemia  · 6/21/2022: Hb 7.3.  Oral iron daily started.  · Patient states early October 2022 Hb around 8.  · 10/20/2022 (initial consult with me): Ferritin 9.  3% saturation.  Hb 9.  Patient states he cannot tolerate oral iron anymore due to constipation and abdominal cramping.    · Insurance requires Venofer instead of Injectafer  · Completed 1000 mg Venofer on 11/29/2022.  · 12/5/2022: Ferritin 124, 39% saturation.  · 12/27/2022: Hb 11.9  · 1/10/2023: Hb today 12.2.    *Source of iron deficiency  · Colon cancer found and resected on 11/11/2022    *Microcytosis, likely due to iron deficiency  MCV 78.8 from 77.1, from  78.8    *Lightheadedness, dyspnea on exertion, fatigue.  Hopefully this will improve with IV iron.    *Class III obesity.  Being overweight can lead to cytopenias through hepatic steatosis.    Body mass index is 40.64 kg/m².  BMI 25 to <30 is overweight  BMI 30 to <35 is class 1 obesity  BMI 35 to <40 is class 2 obesity  BMI 40 or higher is class 3 obesity   Remains overweight.  Ideally, lose weight    Plan:   · CAPEOX x4 cycles (every 3 weeks), to begin 12/27/2022  · Return in 1 week for NP and cycle 2 chemotherapy.   · Appointment made for all 4 cycles including a 2-week appointment after c1d1 (through 2/28/2023)    The patient is on high risk medication that requires close monitoring for toxicity.    Lisa Hernandez, ROSEMARIE  01/10/23

## 2023-01-10 ENCOUNTER — LAB (OUTPATIENT)
Dept: OTHER | Facility: HOSPITAL | Age: 58
End: 2023-01-10
Payer: MEDICAID

## 2023-01-10 ENCOUNTER — OFFICE VISIT (OUTPATIENT)
Dept: ONCOLOGY | Facility: CLINIC | Age: 58
End: 2023-01-10
Payer: MEDICAID

## 2023-01-10 VITALS
WEIGHT: 315 LBS | RESPIRATION RATE: 18 BRPM | OXYGEN SATURATION: 97 % | TEMPERATURE: 98.6 F | DIASTOLIC BLOOD PRESSURE: 103 MMHG | BODY MASS INDEX: 40.43 KG/M2 | SYSTOLIC BLOOD PRESSURE: 157 MMHG | HEIGHT: 74 IN | HEART RATE: 79 BPM

## 2023-01-10 DIAGNOSIS — D50.0 IRON DEFICIENCY ANEMIA DUE TO CHRONIC BLOOD LOSS: ICD-10-CM

## 2023-01-10 DIAGNOSIS — Z79.899 HIGH RISK MEDICATION USE: ICD-10-CM

## 2023-01-10 DIAGNOSIS — C18.2 CANCER OF ASCENDING COLON: Primary | ICD-10-CM

## 2023-01-10 DIAGNOSIS — T45.1X5A CHEMOTHERAPY-INDUCED NAUSEA: ICD-10-CM

## 2023-01-10 DIAGNOSIS — C18.2 CANCER OF ASCENDING COLON: ICD-10-CM

## 2023-01-10 DIAGNOSIS — R11.0 CHEMOTHERAPY-INDUCED NAUSEA: ICD-10-CM

## 2023-01-10 LAB
ALBUMIN SERPL-MCNC: 4.2 G/DL (ref 3.5–5.2)
ALBUMIN/GLOB SERPL: 1.3 G/DL
ALP SERPL-CCNC: 71 U/L (ref 39–117)
ALT SERPL W P-5'-P-CCNC: 18 U/L (ref 1–41)
ANION GAP SERPL CALCULATED.3IONS-SCNC: 9.1 MMOL/L (ref 5–15)
AST SERPL-CCNC: 22 U/L (ref 1–40)
BASOPHILS # BLD AUTO: 0.03 10*3/MM3 (ref 0–0.2)
BASOPHILS NFR BLD AUTO: 0.5 % (ref 0–1.5)
BILIRUB SERPL-MCNC: 0.3 MG/DL (ref 0–1.2)
BUN SERPL-MCNC: 10 MG/DL (ref 6–20)
BUN/CREAT SERPL: 12 (ref 7–25)
CALCIUM SPEC-SCNC: 9.5 MG/DL (ref 8.6–10.5)
CHLORIDE SERPL-SCNC: 106 MMOL/L (ref 98–107)
CO2 SERPL-SCNC: 26.9 MMOL/L (ref 22–29)
CREAT SERPL-MCNC: 0.83 MG/DL (ref 0.76–1.27)
DEPRECATED RDW RBC AUTO: 53.1 FL (ref 37–54)
EGFRCR SERPLBLD CKD-EPI 2021: 103.4 ML/MIN/1.73
EOSINOPHIL # BLD AUTO: 0.16 10*3/MM3 (ref 0–0.4)
EOSINOPHIL NFR BLD AUTO: 2.8 % (ref 0.3–6.2)
ERYTHROCYTE [DISTWIDTH] IN BLOOD BY AUTOMATED COUNT: 20.9 % (ref 12.3–15.4)
GLOBULIN UR ELPH-MCNC: 3.2 GM/DL
GLUCOSE SERPL-MCNC: 139 MG/DL (ref 65–99)
HCT VFR BLD AUTO: 41.2 % (ref 37.5–51)
HGB BLD-MCNC: 12.2 G/DL (ref 13–17.7)
IMM GRANULOCYTES # BLD AUTO: 0.04 10*3/MM3 (ref 0–0.05)
IMM GRANULOCYTES NFR BLD AUTO: 0.7 % (ref 0–0.5)
LYMPHOCYTES # BLD AUTO: 2.75 10*3/MM3 (ref 0.7–3.1)
LYMPHOCYTES NFR BLD AUTO: 48.5 % (ref 19.6–45.3)
MCH RBC QN AUTO: 23.3 PG (ref 26.6–33)
MCHC RBC AUTO-ENTMCNC: 29.6 G/DL (ref 31.5–35.7)
MCV RBC AUTO: 78.8 FL (ref 79–97)
MONOCYTES # BLD AUTO: 0.47 10*3/MM3 (ref 0.1–0.9)
MONOCYTES NFR BLD AUTO: 8.3 % (ref 5–12)
NEUTROPHILS NFR BLD AUTO: 2.22 10*3/MM3 (ref 1.7–7)
NEUTROPHILS NFR BLD AUTO: 39.2 % (ref 42.7–76)
NRBC BLD AUTO-RTO: 0 /100 WBC (ref 0–0.2)
PLATELET # BLD AUTO: 202 10*3/MM3 (ref 140–450)
PMV BLD AUTO: 8.7 FL (ref 6–12)
POTASSIUM SERPL-SCNC: 4 MMOL/L (ref 3.5–5.2)
PROT SERPL-MCNC: 7.4 G/DL (ref 6–8.5)
RBC # BLD AUTO: 5.23 10*6/MM3 (ref 4.14–5.8)
SODIUM SERPL-SCNC: 142 MMOL/L (ref 136–145)
WBC NRBC COR # BLD: 5.67 10*3/MM3 (ref 3.4–10.8)

## 2023-01-10 PROCEDURE — 36415 COLL VENOUS BLD VENIPUNCTURE: CPT

## 2023-01-10 PROCEDURE — 99214 OFFICE O/P EST MOD 30 MIN: CPT | Performed by: NURSE PRACTITIONER

## 2023-01-10 PROCEDURE — 85025 COMPLETE CBC W/AUTO DIFF WBC: CPT | Performed by: INTERNAL MEDICINE

## 2023-01-10 PROCEDURE — 80053 COMPREHEN METABOLIC PANEL: CPT | Performed by: INTERNAL MEDICINE

## 2023-01-12 ENCOUNTER — SPECIALTY PHARMACY (OUTPATIENT)
Dept: PHARMACY | Facility: HOSPITAL | Age: 58
End: 2023-01-12
Payer: MEDICAID

## 2023-01-12 NOTE — PROGRESS NOTES
Specialty Pharmacy Refill Coordination Note     Damián is a 57 y.o. male contacted today regarding refills of capecitabine 150 mg and 500 mg specialty medication(s).    Reviewed and verified with patient:  Allergies  Meds  Problems       Specialty medication(s) and dose(s) confirmed: Yes    Refill Questions    Flowsheet Row Most Recent Value   Changes to allergies? No   Changes to medications? No   New conditions since last clinic visit No   Unplanned office visit, urgent care, ED, or hospital admission in the last 4 weeks  No   How does patient/caregiver feel medication is working? Very good   Financial problems or insurance changes  No   Since the previous refill, were any specialty medication doses or scheduled injections missed or delayed?  No   Does this patient require a clinical escalation to a pharmacist? No          Delivery Questions    Flowsheet Row Most Recent Value   Delivery method Other (Comment)  [Beeline to EP on 1/16/23 pt to  from the office on 1/17/23]   Delivery address correct? Yes   Delivery phone number [phone]   Number of medications in delivery 2   Medication being filled and delivered 2   Doses left of specialty medications 0   Is there any medication that is due not being filled? No   Supplies needed? No supplies needed   Cooler needed? No   Do any medications need mixed or dated? No   Copay form of payment Payment plan already set up   Questions or concerns for the pharmacist? No   Are any medications first time fills? No               Follow-up: 21 day(s)    Lena Camara RPH  1/12/2023  13:19 EST

## 2023-01-12 NOTE — PROGRESS NOTES
Specialty Note ( CapeOx)    APRN dictation is noted    • CAPEOX x4 cycles (every 3 weeks), to begin 12/27/2022  • Return in 1 week for NP and cycle 2 chemotherapy.   • Appointment made for all 4 cycles including a 2-week appointment after c1d1 (through 2/28/2023)       Labs reviewed        1/10/2023   WBC 3.40 - 10.80 10*3/mm3 5.67   Neutrophils Absolute 1.70 - 7.00 10*3/mm3 2.22   Hemoglobin 13.0 - 17.7 g/dL 12.2 (A)   Hematocrit 37.5 - 51.0 % 41.2   Platelets 140 - 450 10*3/mm3 202   Creatinine 0.76 - 1.27 mg/dL 0.83   BUN 6 - 20 mg/dL 10   Sodium 136 - 145 mmol/L 142   Potassium 3.5 - 5.2 mmol/L 4.0   Glucose 65 - 99 mg/dL 139 (A)   Calcium 8.6 - 10.5 mg/dL 9.5   Albumin 3.5 - 5.2 g/dL 4.2   Total Protein 6.0 - 8.5 g/dL 7.4   AST (SGOT) 1 - 40 U/L 22   ALT (SGPT) 1 - 41 U/L 18   Alkaline Phosphatase 39 - 117 U/L 71   Total Bilirubin 0.0 - 1.2 mg/dL 0.3       Capecitabine 2150 mg po 14/21 days continues

## 2023-01-16 NOTE — PROGRESS NOTES
.     REASON FOR FOLLOWUP :   Resected colon cancer, iron deficiency anemia    HISTORY OF PRESENT ILLNESS:  The patient is a 57 y.o. year old male  who is here for follow-up with the above-mentioned history.    Continues on CAPEOX, taking Xeloda days 1-14/21 days.  Due today to start cycle 2.  He will receive his dose of Xeloda today, and will initiate Xeloda this evening.  He continues to tolerate treatment well.  He did experience the expected cold sensitivity for a few days following his oxaliplatin infusion.  He is eating and drinking adequately.  Bowels moving regularly.  Denies fever or chills.  Denies nausea or vomiting.  Denies new or worsening pain.  Denies signs or symptoms of peripheral neuropathy.    Past Medical History:   Diagnosis Date   • Chronic cough     SINCE COVID DIAGNOSIS 9/2021   • Colon cancer (HCC) 11/03/2022    Ascending colon invasive moderately differentiated adenocarcinoma   • Colon polyps 11/03/2022    Transverse colon: fragments of tubular adenoma, rectum: fragments of tubular adenoma and hyperplastic polyp   • History of COVID-19 09/2021   • Hypertension    • Iron deficiency anemia      Past Surgical History:   Procedure Laterality Date   • CHOLECYSTECTOMY WITH INTRAOPERATIVE CHOLANGIOGRAM N/A 11/11/2022    Procedure: LAPAROSCOPIC CHOLECYSTECTOMY;  Surgeon: Nigel Tolentino MD;  Location: [facility] OR;  Service: General;  Laterality: N/A;   • COLON RESECTION Right 11/11/2022    Procedure: COLON RESECTION RIGHT;  Surgeon: Nigel Tolentino MD;  Location: [facility] OR;  Service: General;  Laterality: Right;   • COLONOSCOPY N/A 11/03/2022    Procedure: COLONOSCOPY into cecum with tattoo ink injection, bx's and cold bx/snare polypectomies;  Surgeon: Anup Oconnell MD;  Location: [facility] ENDOSCOPY;  Service: Gastroenterology;  Laterality: N/A;  pre: iron def anemia, heme positive stool  post: polyps, colon mass   • ENDOSCOPY N/A 11/03/2022    Procedure:  ESOPHAGOGASTRODUODENOSCOPYr with bx;  Surgeon: Anup Oconnell MD;  Location: [facility] ENDOSCOPY;  Service: Gastroenterology;  Laterality: N/A;  pre:  iron def anemia, heme positive stool  post: gastritis    • INGUINAL HERNIA REPAIR Bilateral 1970   • LACERATION REPAIR Right     THUMB/ HAND       MEDICATIONS    Current Outpatient Medications:   •  amLODIPine (NORVASC) 5 MG tablet, Take 1 tablet by mouth Daily., Disp: , Rfl:   •  Bismuth 262 MG chewable tablet, Chew 2 tablets 4 (Four) Times a Day., Disp: 112 tablet, Rfl: 0  •  capecitabine (XELODA) 150 MG chemo tablet, Take 1 tablet by mouth 2 (Two) Times a Day with 1 other capecitabine prescription for 2,150 mg total. Take for 14 days on, then off for 7 days., Disp: 28 tablet, Rfl: 3  •  capecitabine (XELODA) 500 MG chemo tablet, Take 4 tablets by mouth 2 (Two) Times a Day with 1 other capecitabine prescription for 2,150 mg total. Take for 14 days on, then off for 7 days., Disp: 112 tablet, Rfl: 3  •  Chlorcyclizine-Pseudoephed (Stahist AD) 25-60 MG tablet, Take 1 tablet by mouth As Needed., Disp: , Rfl:   •  ferrous sulfate 325 (65 FE) MG tablet, Take 1 tablet by mouth Daily With Breakfast., Disp: , Rfl:   •  fluticasone (FLONASE) 50 MCG/ACT nasal spray, 2 sprays into the nostril(s) as directed by provider As Needed., Disp: , Rfl:   •  meloxicam (MOBIC) 7.5 MG tablet, Take 1 tablet by mouth Daily., Disp: , Rfl:   •  ondansetron (Zofran) 4 MG tablet, Take 1 tablet by mouth Every 6 (Six) Hours As Needed for Nausea or Vomiting., Disp: 30 tablet, Rfl: 1  •  pantoprazole (PROTONIX) 40 MG EC tablet, Take 1 tablet by mouth Daily., Disp: , Rfl:   •  tetracycline (ACHROMYCIN,SUMYCIN) 500 MG capsule, Take 1 capsule by mouth 4 (Four) Times a Day., Disp: 56 capsule, Rfl: 0    ALLERGIES:   No Known Allergies    SOCIAL HISTORY:       Social History     Socioeconomic History   • Marital status: Single   Tobacco Use   • Smoking status: Never     Passive exposure: Never   •  "Smokeless tobacco: Never   Vaping Use   • Vaping Use: Never used   Substance and Sexual Activity   • Alcohol use: Yes     Comment: rarely   • Drug use: Never   • Sexual activity: Defer         FAMILY HISTORY:  Family History   Problem Relation Age of Onset   • Heart failure Mother    • Clotting disorder Father         DVT   • Hypertension Brother    • Colon cancer Neg Hx    • Crohn's disease Neg Hx    • Colon polyps Neg Hx    • Irritable bowel syndrome Neg Hx    • Ulcerative colitis Neg Hx    • Malig Hyperthermia Neg Hx      REVIEW OF SYSTEMS:  Review of Systems   Constitutional: Negative for activity change.   HENT: Negative for nosebleeds and trouble swallowing.    Respiratory: Negative for shortness of breath and wheezing.    Cardiovascular: Negative for chest pain and palpitations.   Gastrointestinal: Negative for diarrhea and nausea.   Genitourinary: Negative for dysuria and hematuria.   Musculoskeletal: Negative for arthralgias and myalgias.   Neurological: Negative for seizures and syncope.   Hematological: Negative for adenopathy. Does not bruise/bleed easily.   Psychiatric/Behavioral: Negative for confusion.          Vitals:    01/17/23 0823   BP: 136/91   Pulse: 91   Resp: 16   Temp: 98 °F (36.7 °C)   TempSrc: Temporal   SpO2: 96%   Weight: (!) 144 kg (318 lb 1.6 oz)   Height: 188 cm (74.02\")   PainSc: 0-No pain     Current Status 1/17/2023   ECOG score 0      PHYSICAL EXAM:      CONSTITUTIONAL:  Vital signs reviewed.  No distress, looks comfortable.  EYES:  Conjunctiva and lids unremarkable.  PERRLA  EARS,NOSE,MOUTH,THROAT:  Ears and nose appear unremarkable.  Lips, teeth, gums appear unremarkable.  RESPIRATORY:  Normal respiratory effort.  Lungs clear to auscultation bilaterally.  CARDIOVASCULAR:  Normal S1, S2.  No murmurs rubs or gallops.  No significant lower extremity edema.  GASTROINTESTINAL: Abdomen appears unremarkable.  Nontender.  No hepatomegaly.  No splenomegaly.  LYMPHATIC:  No cervical, " supraclavicular, axillary lymphadenopathy.  SKIN:  Warm.  No rashes.  PSYCHIATRIC:  Normal judgment and insight.  Normal mood and affect.    RECENT LABS:        WBC   Date Value Ref Range Status   01/10/2023 5.67 3.40 - 10.80 10*3/mm3 Final   12/27/2022 8.46 3.40 - 10.80 10*3/mm3 Final   12/05/2022 6.39 3.40 - 10.80 10*3/mm3 Final   11/29/2022 4.09 3.40 - 10.80 10*3/mm3 Final   11/12/2022 13.53 (H) 3.40 - 10.80 10*3/mm3 Final   11/07/2022 7.22 3.40 - 10.80 10*3/mm3 Final   10/20/2022 6.24 3.40 - 10.80 10*3/mm3 Final     Hemoglobin   Date Value Ref Range Status   01/10/2023 12.2 (L) 13.0 - 17.7 g/dL Final   12/27/2022 11.9 (L) 13.0 - 17.7 g/dL Final   12/05/2022 11.1 (L) 13.0 - 17.7 g/dL Final   11/29/2022 10.1 (L) 13.0 - 17.7 g/dL Final   11/12/2022 8.2 (L) 13.0 - 17.7 g/dL Final   11/07/2022 8.4 (L) 13.0 - 17.7 g/dL Final   10/20/2022 9.0 (L) 13.0 - 17.7 g/dL Final     Platelets   Date Value Ref Range Status   01/10/2023 202 140 - 450 10*3/mm3 Final   12/27/2022 285 140 - 450 10*3/mm3 Final   12/05/2022 299 140 - 450 10*3/mm3 Final   11/29/2022 327 140 - 450 10*3/mm3 Final   11/12/2022 321 140 - 450 10*3/mm3 Final   11/07/2022 330 140 - 450 10*3/mm3 Final   10/20/2022 327 140 - 450 10*3/mm3 Final       Assessment & Plan   There are no diagnoses linked to this encounter.    Damián Diaz   *Ascending colon adenocarcinoma  · Discovered on colonoscopy 11/3/2022, Dr. Oconnell, for MINA work-up.  Invasive moderately differentiated adenocarcinoma.  · CT AP 11/7/2022: Circumferential malignancy ascending colon with adjacent mesenteric LAD.  4 mm RML nodule stable from 6/8/2022.  Radiologist felt likely benign but recommended continued follow-up.  Granulomatous calcifications both lower lobes.  · Resection 11/11/2022, Dr. Tolentino: Moderately differentiated adenocarcinoma of cecum, 5.2 cm.  Grade 2.  Invades through muscularis propria into pericolic fat.  Margins negative.  No perineural invasion or lymphovascular invasion.   One of the 30 nodes positive.  · [id number], stage 3  · No deficiency of MMR proteins.  (pMMR) (consistent with STEFANIA)  · Per NCCN guidelines: Low risk stage III colon cancer preferred regimens are Capeox x3 months versus FOLFOX x3 to 6 months.  Reviewed these options with the patient.  He has chosen Capeox x3 months  · Adjuvant CAPEOX x4 cycles starts 12/27/22  · Oxaliplatin D1.  Xeloda 850 mg/m2 per dose (2150 mg rounding for tablet strength), twice per day, D1-14/21 days.  Plan 4 cycles total, which is 3 months of therapy.  · CT chest 12/20/2022: Stable 4 mm RML nodule favored to be a noncalcified granuloma as there is evidence of granulomatous disease elsewhere in the chest.  Could not rule out malignancy.  Radiologist recommended follow-up    *RML pulmonary nodule  · Although only seen in hindsight, first seen on CT 6/8/2022, per CT chest 12/20/2022, when it was read as unchanged from 6/8/2022    *Iron deficiency anemia  · 6/21/2022: Hb 7.3.  Oral iron daily started.  · Patient states early October 2022 Hb around 8.  · 10/20/2022 (initial consult with me): Ferritin 9.  3% saturation.  Hb 9.  Patient states he cannot tolerate oral iron anymore due to constipation and abdominal cramping.    · Insurance requires Venofer instead of Injectafer  · Completed 1000 mg Venofer on 11/29/2022.  · 12/5/2022: Ferritin 124, 39% saturation.  · 12/27/2022: Hb 11.9  · Hb 12.4    *Source of iron deficiency  · Colon cancer found and resected on 11/11/2022    *Microcytosis, likely due to iron deficiency  MCV  79.3 from 78.8 from 77.1, from 78.8    *Lightheadedness, dyspnea on exertion, fatigue.  Hopefully this will improve with IV iron.    *Class III obesity.  Being overweight can lead to cytopenias through hepatic steatosis.    Body mass index is 40.82 kg/m².  BMI 25 to <30 is overweight  BMI 30 to <35 is class 1 obesity  BMI 35 to <40 is class 2 obesity  BMI 40 or higher is class 3 obesity   Remains overweight.  Ideally, lose  weight    Plan:   · CAPEOX x4 cycles (every 3 weeks), to begin 12/27/2022.  Cycle 2 today.  · Return in 3 weeks for NP and cycle 3 chemotherapy.   · Appointment made for all 4 cycles including a 2-week appointment after c1d1 (through 2/28/2023)    The patient is on high risk medication that requires close monitoring for toxicity.    Lisa Hernandez, APRN  01/17/23

## 2023-01-17 ENCOUNTER — INFUSION (OUTPATIENT)
Dept: ONCOLOGY | Facility: HOSPITAL | Age: 58
End: 2023-01-17
Payer: MEDICAID

## 2023-01-17 ENCOUNTER — OFFICE VISIT (OUTPATIENT)
Dept: ONCOLOGY | Facility: CLINIC | Age: 58
End: 2023-01-17
Payer: MEDICAID

## 2023-01-17 ENCOUNTER — SPECIALTY PHARMACY (OUTPATIENT)
Dept: PHARMACY | Facility: HOSPITAL | Age: 58
End: 2023-01-17
Payer: MEDICAID

## 2023-01-17 VITALS
OXYGEN SATURATION: 96 % | RESPIRATION RATE: 16 BRPM | HEART RATE: 80 BPM | SYSTOLIC BLOOD PRESSURE: 148 MMHG | DIASTOLIC BLOOD PRESSURE: 88 MMHG

## 2023-01-17 VITALS
HEART RATE: 91 BPM | OXYGEN SATURATION: 96 % | TEMPERATURE: 98 F | HEIGHT: 74 IN | BODY MASS INDEX: 40.43 KG/M2 | RESPIRATION RATE: 16 BRPM | WEIGHT: 315 LBS | DIASTOLIC BLOOD PRESSURE: 91 MMHG | SYSTOLIC BLOOD PRESSURE: 136 MMHG

## 2023-01-17 DIAGNOSIS — C18.2 CANCER OF ASCENDING COLON: Primary | ICD-10-CM

## 2023-01-17 DIAGNOSIS — Z79.899 HIGH RISK MEDICATION USE: ICD-10-CM

## 2023-01-17 LAB
ALBUMIN SERPL-MCNC: 4.1 G/DL (ref 3.5–5.2)
ALBUMIN/GLOB SERPL: 1.2 G/DL
ALP SERPL-CCNC: 78 U/L (ref 39–117)
ALT SERPL W P-5'-P-CCNC: 20 U/L (ref 1–41)
ANION GAP SERPL CALCULATED.3IONS-SCNC: 10.5 MMOL/L (ref 5–15)
AST SERPL-CCNC: 26 U/L (ref 1–40)
BASOPHILS # BLD AUTO: 0.04 10*3/MM3 (ref 0–0.2)
BASOPHILS NFR BLD AUTO: 0.7 % (ref 0–1.5)
BILIRUB SERPL-MCNC: 0.3 MG/DL (ref 0–1.2)
BUN SERPL-MCNC: 12 MG/DL (ref 6–20)
BUN/CREAT SERPL: 13.2 (ref 7–25)
CALCIUM SPEC-SCNC: 9.3 MG/DL (ref 8.6–10.5)
CHLORIDE SERPL-SCNC: 103 MMOL/L (ref 98–107)
CO2 SERPL-SCNC: 24.5 MMOL/L (ref 22–29)
CREAT SERPL-MCNC: 0.91 MG/DL (ref 0.76–1.27)
DEPRECATED RDW RBC AUTO: 57.1 FL (ref 37–54)
EGFRCR SERPLBLD CKD-EPI 2021: 99.5 ML/MIN/1.73
EOSINOPHIL # BLD AUTO: 0.18 10*3/MM3 (ref 0–0.4)
EOSINOPHIL NFR BLD AUTO: 3.3 % (ref 0.3–6.2)
ERYTHROCYTE [DISTWIDTH] IN BLOOD BY AUTOMATED COUNT: 22.5 % (ref 12.3–15.4)
GLOBULIN UR ELPH-MCNC: 3.4 GM/DL
GLUCOSE SERPL-MCNC: 151 MG/DL (ref 65–99)
HCT VFR BLD AUTO: 42.1 % (ref 37.5–51)
HGB BLD-MCNC: 12.4 G/DL (ref 13–17.7)
IMM GRANULOCYTES # BLD AUTO: 0.01 10*3/MM3 (ref 0–0.05)
IMM GRANULOCYTES NFR BLD AUTO: 0.2 % (ref 0–0.5)
LYMPHOCYTES # BLD AUTO: 1.66 10*3/MM3 (ref 0.7–3.1)
LYMPHOCYTES NFR BLD AUTO: 30.5 % (ref 19.6–45.3)
MCH RBC QN AUTO: 23.4 PG (ref 26.6–33)
MCHC RBC AUTO-ENTMCNC: 29.5 G/DL (ref 31.5–35.7)
MCV RBC AUTO: 79.3 FL (ref 79–97)
MONOCYTES # BLD AUTO: 0.8 10*3/MM3 (ref 0.1–0.9)
MONOCYTES NFR BLD AUTO: 14.7 % (ref 5–12)
NEUTROPHILS NFR BLD AUTO: 2.75 10*3/MM3 (ref 1.7–7)
NEUTROPHILS NFR BLD AUTO: 50.6 % (ref 42.7–76)
NRBC BLD AUTO-RTO: 0 /100 WBC (ref 0–0.2)
PLATELET # BLD AUTO: 170 10*3/MM3 (ref 140–450)
PMV BLD AUTO: 8.7 FL (ref 6–12)
POTASSIUM SERPL-SCNC: 3.8 MMOL/L (ref 3.5–5.2)
PROT SERPL-MCNC: 7.5 G/DL (ref 6–8.5)
RBC # BLD AUTO: 5.31 10*6/MM3 (ref 4.14–5.8)
SODIUM SERPL-SCNC: 138 MMOL/L (ref 136–145)
WBC NRBC COR # BLD: 5.44 10*3/MM3 (ref 3.4–10.8)

## 2023-01-17 PROCEDURE — 99214 OFFICE O/P EST MOD 30 MIN: CPT | Performed by: NURSE PRACTITIONER

## 2023-01-17 PROCEDURE — 25010000002 DEXAMETHASONE SODIUM PHOSPHATE 100 MG/10ML SOLUTION: Performed by: NURSE PRACTITIONER

## 2023-01-17 PROCEDURE — 80053 COMPREHEN METABOLIC PANEL: CPT | Performed by: NURSE PRACTITIONER

## 2023-01-17 PROCEDURE — 85025 COMPLETE CBC W/AUTO DIFF WBC: CPT | Performed by: NURSE PRACTITIONER

## 2023-01-17 PROCEDURE — 96415 CHEMO IV INFUSION ADDL HR: CPT

## 2023-01-17 PROCEDURE — 25010000002 ONDANSETRON PER 1 MG: Performed by: NURSE PRACTITIONER

## 2023-01-17 PROCEDURE — 96413 CHEMO IV INFUSION 1 HR: CPT

## 2023-01-17 PROCEDURE — 25010000002 OXALIPLATIN PER 0.5 MG: Performed by: NURSE PRACTITIONER

## 2023-01-17 PROCEDURE — 96375 TX/PRO/DX INJ NEW DRUG ADDON: CPT

## 2023-01-17 RX ORDER — DIPHENHYDRAMINE HYDROCHLORIDE 50 MG/ML
50 INJECTION INTRAMUSCULAR; INTRAVENOUS AS NEEDED
Status: CANCELLED | OUTPATIENT
Start: 2023-01-17

## 2023-01-17 RX ORDER — FAMOTIDINE 10 MG/ML
20 INJECTION, SOLUTION INTRAVENOUS AS NEEDED
Status: CANCELLED | OUTPATIENT
Start: 2023-01-17

## 2023-01-17 RX ORDER — DEXTROSE MONOHYDRATE 50 MG/ML
250 INJECTION, SOLUTION INTRAVENOUS ONCE
Status: COMPLETED | OUTPATIENT
Start: 2023-01-17 | End: 2023-01-17

## 2023-01-17 RX ADMIN — DEXAMETHASONE SODIUM PHOSPHATE 12 MG: 100 INJECTION INTRAMUSCULAR; INTRAVENOUS at 09:45

## 2023-01-17 RX ADMIN — ONDANSETRON 16 MG: 2 INJECTION, SOLUTION INTRAMUSCULAR; INTRAVENOUS at 09:20

## 2023-01-17 RX ADMIN — DEXTROSE MONOHYDRATE 250 ML: 50 INJECTION, SOLUTION INTRAVENOUS at 09:20

## 2023-01-17 RX ADMIN — OXALIPLATIN 340 MG: 5 INJECTION, SOLUTION, CONCENTRATE INTRAVENOUS at 10:19

## 2023-01-17 NOTE — PROGRESS NOTES
I have received Xeloda from [facility] and placed it in the [employer] at . Medication was received on 1/16/23 and put in [employer] on 1/17/23.

## 2023-01-18 ENCOUNTER — SPECIALTY PHARMACY (OUTPATIENT)
Dept: PHARMACY | Facility: HOSPITAL | Age: 58
End: 2023-01-18
Payer: MEDICAID

## 2023-01-18 NOTE — PROGRESS NOTES
[facility] Lab Review- CapeOx      1/17/2023  Day 1   WBC 3.40 - 10.80 10*3/mm3 5.44   Neutrophils Absolute 1.70 - 7.00 10*3/mm3 2.75   Hemoglobin 13.0 - 17.7 g/dL 12.4 (A)   Hematocrit 37.5 - 51.0 % 42.1   Platelets 140 - 450 10*3/mm3 170   Creatinine 0.76 - 1.27 mg/dL 0.91   BUN 6 - 20 mg/dL 12   Sodium 136 - 145 mmol/L 138   Potassium 3.5 - 5.2 mmol/L 3.8   Glucose 65 - 99 mg/dL 151 (A)   Calcium 8.6 - 10.5 mg/dL 9.3   Albumin 3.5 - 5.2 g/dL 4.1   Total Protein 6.0 - 8.5 g/dL 7.5   AST (SGOT) 1 - 40 U/L 26   ALT (SGPT) 1 - 41 U/L 20   Alkaline Phosphatase 39 - 117 U/L 78   Total Bilirubin 0.0 - 1.2 mg/dL 0.3   eGFR >60.0 mL/min/1.73 99.5  National Kidney Foundation and American Society of Nephrology (ASN) Task Force recommended calculation based on the Chronic Kidney Disease Epidemiology Collaboration (CKD-EPI) equation refit without adjustment for race.     APRN dictation noted:   Plan:   • CAPEOX x4 cycles (every 3 weeks), to begin 12/27/2022.  Cycle 2 today.  • Return in 3 weeks for NP and cycle 3 chemotherapy.   • Appointment made for all 4 cycles including a 2-week appointment after c1d1 (through 2/28/2023)    Thanks,   Lena Camara, NancyD

## 2023-02-01 ENCOUNTER — SPECIALTY PHARMACY (OUTPATIENT)
Dept: PHARMACY | Facility: HOSPITAL | Age: 58
End: 2023-02-01
Payer: MEDICAID

## 2023-02-01 NOTE — PROGRESS NOTES
Specialty Pharmacy Refill Coordination Note     Damián is a 57 y.o. male contacted today regarding refills of  CAPECITABINE specialty medication(s).    Reviewed and verified with patient:       Specialty medication(s) and dose(s) confirmed: yes    Refill Questions    Flowsheet Row Most Recent Value   Changes to allergies? No   Changes to medications? No   New conditions since last clinic visit No   Unplanned office visit, urgent care, ED, or hospital admission in the last 4 weeks  No   How does patient/caregiver feel medication is working? Good   Financial problems or insurance changes  No   Since the previous refill, were any specialty medication doses or scheduled injections missed or delayed?  No   Does this patient require a clinical escalation to a pharmacist? No          Delivery Questions    Flowsheet Row Most Recent Value   Delivery method Other (Comment)  [Please beeline to [employer] on 2/6 for a 2/7 pickup. $0 co-pay.]   Delivery address correct? Yes   Delivery phone number [phone]   Preferred delivery time? Anytime   Number of medications in delivery 2   Medication being filled and delivered two strengths of capecitabine   Doses left of specialty medications 0   Is there any medication that is due not being filled? No   Supplies needed? No supplies needed   Cooler needed? No   Do any medications need mixed or dated? No   Copay form of payment Payment plan already set up   Additional comments N/A   Questions or concerns for the pharmacist? No   Explain any questions or concerns for the pharmacist N/A   Are any medications first time fills? No                 Follow-up: 21 day(s)     Genna Bennett, Pharmacy Technician  Specialty Pharmacy Technician

## 2023-02-05 NOTE — PROGRESS NOTES
.     REASON FOR FOLLOWUP :   Resected colon cancer, iron deficiency anemia    HISTORY OF PRESENT ILLNESS:  The patient is a 57 y.o. year old male  who is here for follow-up with the above-mentioned history.    Continues on CAPEOX, taking Xeloda days 1-14/21 days.  Due today to start cycle 3.  He continues to experience the expected cold sensitivity following oxaliplatin infusion.  Eating and drinking adequately.  Bowels moving regularly.  Denies fever or chills.  Denies nausea or vomiting.  Denies signs or symptoms of peripheral neuropathy.    His blood pressure is elevated today at 146/100.  He reports the week he was off Xeloda, he forgot to take his blood pressure medication.  He has resumed it this morning.  We will give him some clonidine in clinic today for blood pressure.  He denies dizziness, vision changes, headache, or chest pain.    Past Medical History:   Diagnosis Date   • Chronic cough     SINCE COVID DIAGNOSIS 9/2021   • Colon cancer (HCC) 11/03/2022    Ascending colon invasive moderately differentiated adenocarcinoma   • Colon polyps 11/03/2022    Transverse colon: fragments of tubular adenoma, rectum: fragments of tubular adenoma and hyperplastic polyp   • History of COVID-19 09/2021   • Hypertension    • Iron deficiency anemia      Past Surgical History:   Procedure Laterality Date   • CHOLECYSTECTOMY WITH INTRAOPERATIVE CHOLANGIOGRAM N/A 11/11/2022    Procedure: LAPAROSCOPIC CHOLECYSTECTOMY;  Surgeon: Nigel Tolentino MD;  Location: [facility] OR;  Service: General;  Laterality: N/A;   • COLON RESECTION Right 11/11/2022    Procedure: COLON RESECTION RIGHT;  Surgeon: Nigel Tolentino MD;  Location: [facility] MAIN OR;  Service: General;  Laterality: Right;   • COLONOSCOPY N/A 11/03/2022    Procedure: COLONOSCOPY into cecum with tattoo ink injection, bx's and cold bx/snare polypectomies;  Surgeon: Anup Oconnell MD;  Location: [facility] ENDOSCOPY;  Service: Gastroenterology;  Laterality: N/A;   pre: iron def anemia, heme positive stool  post: polyps, colon mass   • ENDOSCOPY N/A 11/03/2022    Procedure: ESOPHAGOGASTRODUODENOSCOPYr with bx;  Surgeon: Anup Oconnell MD;  Location: [facility] ENDOSCOPY;  Service: Gastroenterology;  Laterality: N/A;  pre:  iron def anemia, heme positive stool  post: gastritis    • INGUINAL HERNIA REPAIR Bilateral 1970   • LACERATION REPAIR Right     THUMB/ HAND       MEDICATIONS    Current Outpatient Medications:   •  amLODIPine (NORVASC) 5 MG tablet, Take 1 tablet by mouth Daily., Disp: , Rfl:   •  Bismuth 262 MG chewable tablet, Chew 2 tablets 4 (Four) Times a Day., Disp: 112 tablet, Rfl: 0  •  capecitabine (XELODA) 150 MG chemo tablet, Take 1 tablet by mouth 2 (Two) Times a Day with 1 other capecitabine prescription for 2,150 mg total. Take for 14 days on, then off for 7 days., Disp: 28 tablet, Rfl: 3  •  capecitabine (XELODA) 500 MG chemo tablet, Take 4 tablets by mouth 2 (Two) Times a Day with 1 other capecitabine prescription for 2,150 mg total. Take for 14 days on, then off for 7 days., Disp: 112 tablet, Rfl: 3  •  Chlorcyclizine-Pseudoephed (Stahist AD) 25-60 MG tablet, Take 1 tablet by mouth As Needed., Disp: , Rfl:   •  ferrous sulfate 325 (65 FE) MG tablet, Take 1 tablet by mouth Daily With Breakfast., Disp: , Rfl:   •  fluticasone (FLONASE) 50 MCG/ACT nasal spray, 2 sprays into the nostril(s) as directed by provider As Needed., Disp: , Rfl:   •  meloxicam (MOBIC) 7.5 MG tablet, Take 1 tablet by mouth Daily., Disp: , Rfl:   •  ondansetron (Zofran) 4 MG tablet, Take 1 tablet by mouth Every 6 (Six) Hours As Needed for Nausea or Vomiting., Disp: 30 tablet, Rfl: 1  •  pantoprazole (PROTONIX) 40 MG EC tablet, Take 1 tablet by mouth Daily., Disp: , Rfl:   •  tetracycline (ACHROMYCIN,SUMYCIN) 500 MG capsule, Take 1 capsule by mouth 4 (Four) Times a Day., Disp: 56 capsule, Rfl: 0    ALLERGIES:   No Known Allergies    SOCIAL HISTORY:       Social History     Socioeconomic  "History   • Marital status: Single   Tobacco Use   • Smoking status: Never     Passive exposure: Never   • Smokeless tobacco: Never   Vaping Use   • Vaping Use: Never used   Substance and Sexual Activity   • Alcohol use: Yes     Comment: rarely   • Drug use: Never   • Sexual activity: Defer         FAMILY HISTORY:  Family History   Problem Relation Age of Onset   • Heart failure Mother    • Clotting disorder Father         DVT   • Hypertension Brother    • Colon cancer Neg Hx    • Crohn's disease Neg Hx    • Colon polyps Neg Hx    • Irritable bowel syndrome Neg Hx    • Ulcerative colitis Neg Hx    • Malig Hyperthermia Neg Hx      REVIEW OF SYSTEMS:  Review of Systems   Constitutional: Negative for activity change.   HENT: Negative for nosebleeds and trouble swallowing.    Respiratory: Negative for shortness of breath and wheezing.    Cardiovascular: Negative for chest pain and palpitations.   Gastrointestinal: Negative for diarrhea and nausea.   Genitourinary: Negative for dysuria and hematuria.   Musculoskeletal: Negative for arthralgias and myalgias.   Neurological: Negative for seizures and syncope.   Hematological: Negative for adenopathy. Does not bruise/bleed easily.   Psychiatric/Behavioral: Negative for confusion.          Vitals:    02/07/23 0821   BP: 146/100   Pulse: 74   Resp: 16   Temp: 97.7 °F (36.5 °C)   TempSrc: Temporal   SpO2: 94%   Weight: (!) 144 kg (318 lb 1.6 oz)   Height: 188 cm (74.02\")   PainSc: 0-No pain     Current Status 2/7/2023   ECOG score 0      PHYSICAL EXAM:      CONSTITUTIONAL:  Vital signs reviewed.  No distress, looks comfortable.  EYES:  Conjunctiva and lids unremarkable.  PERRLA  EARS,NOSE,MOUTH,THROAT:  Ears and nose appear unremarkable.  Lips, teeth, gums appear unremarkable.  RESPIRATORY:  Normal respiratory effort.  Lungs clear to auscultation bilaterally.  CARDIOVASCULAR:  Normal S1, S2.  No murmurs rubs or gallops.  No significant lower extremity edema.  GASTROINTESTINAL: " Abdomen appears unremarkable.  Nontender.  No hepatomegaly.  No splenomegaly.  LYMPHATIC:  No cervical, supraclavicular, axillary lymphadenopathy.  SKIN:  Warm.  No rashes.  PSYCHIATRIC:  Normal judgment and insight.  Normal mood and affect.    RECENT LABS:        WBC   Date Value Ref Range Status   02/07/2023 4.77 3.40 - 10.80 10*3/mm3 Final   01/17/2023 5.44 3.40 - 10.80 10*3/mm3 Final   01/10/2023 5.67 3.40 - 10.80 10*3/mm3 Final   12/27/2022 8.46 3.40 - 10.80 10*3/mm3 Final   12/05/2022 6.39 3.40 - 10.80 10*3/mm3 Final   11/29/2022 4.09 3.40 - 10.80 10*3/mm3 Final   11/12/2022 13.53 (H) 3.40 - 10.80 10*3/mm3 Final   11/07/2022 7.22 3.40 - 10.80 10*3/mm3 Final   10/20/2022 6.24 3.40 - 10.80 10*3/mm3 Final     Hemoglobin   Date Value Ref Range Status   02/07/2023 12.5 (L) 13.0 - 17.7 g/dL Final   01/17/2023 12.4 (L) 13.0 - 17.7 g/dL Final   01/10/2023 12.2 (L) 13.0 - 17.7 g/dL Final   12/27/2022 11.9 (L) 13.0 - 17.7 g/dL Final   12/05/2022 11.1 (L) 13.0 - 17.7 g/dL Final   11/29/2022 10.1 (L) 13.0 - 17.7 g/dL Final   11/12/2022 8.2 (L) 13.0 - 17.7 g/dL Final   11/07/2022 8.4 (L) 13.0 - 17.7 g/dL Final   10/20/2022 9.0 (L) 13.0 - 17.7 g/dL Final     Platelets   Date Value Ref Range Status   02/07/2023 169 140 - 450 10*3/mm3 Final   01/17/2023 170 140 - 450 10*3/mm3 Final   01/10/2023 202 140 - 450 10*3/mm3 Final   12/27/2022 285 140 - 450 10*3/mm3 Final   12/05/2022 299 140 - 450 10*3/mm3 Final   11/29/2022 327 140 - 450 10*3/mm3 Final   11/12/2022 321 140 - 450 10*3/mm3 Final   11/07/2022 330 140 - 450 10*3/mm3 Final   10/20/2022 327 140 - 450 10*3/mm3 Final       Assessment & Plan   There are no diagnoses linked to this encounter.    Damián Diaz   *Ascending colon adenocarcinoma  · Discovered on colonoscopy 11/3/2022, Dr. Oconnell, for MINA work-up.  Invasive moderately differentiated adenocarcinoma.  · CT AP 11/7/2022: Circumferential malignancy ascending colon with adjacent mesenteric LAD.  4 mm RML nodule  stable from 6/8/2022.  Radiologist felt likely benign but recommended continued follow-up.  Granulomatous calcifications both lower lobes.  · Resection 11/11/2022, Dr. Tolentino: Moderately differentiated adenocarcinoma of cecum, 5.2 cm.  Grade 2.  Invades through muscularis propria into pericolic fat.  Margins negative.  No perineural invasion or lymphovascular invasion.  One of the 30 nodes positive.  · [id number], stage 3  · No deficiency of MMR proteins.  (pMMR) (consistent with STEFANIA)  · Per NCCN guidelines: Low risk stage III colon cancer preferred regimens are Capeox x3 months versus FOLFOX x3 to 6 months.  Reviewed these options with the patient.  He has chosen Capeox x3 months  · Adjuvant CAPEOX x4 cycles starts 12/27/22  · Oxaliplatin D1.  Xeloda 850 mg/m2 per dose (2150 mg rounding for tablet strength), twice per day, D1-14/21 days.  Plan 4 cycles total, which is 3 months of therapy.  · CT chest 12/20/2022: Stable 4 mm RML nodule favored to be a noncalcified granuloma as there is evidence of granulomatous disease elsewhere in the chest.  Could not rule out malignancy.  Radiologist recommended follow-up  · 2/7/2023: Cycle 4 CapeOx    *RML pulmonary nodule  · Although only seen in hindsight, first seen on CT 6/8/2022, per CT chest 12/20/2022, when it was read as unchanged from 6/8/2022    *Iron deficiency anemia  · 6/21/2022: Hb 7.3.  Oral iron daily started.  · Patient states early October 2022 Hb around 8.  · 10/20/2022 (initial consult with me): Ferritin 9.  3% saturation.  Hb 9.  Patient states he cannot tolerate oral iron anymore due to constipation and abdominal cramping.    · Insurance requires Venofer instead of Injectafer  · Completed 1000 mg Venofer on 11/29/2022.  · 12/5/2022: Ferritin 124, 39% saturation.  · 12/27/2022: Hb 11.9  · Hb 12.5.    *Source of iron deficiency  · Colon cancer found and resected on 11/11/2022    *Microcytosis, likely due to iron deficiency  MCV  79.9 from 79.3 from 78.8 from  77.1, from 78.8    *Lightheadedness, dyspnea on exertion, fatigue.  Hopefully this will improve with IV iron.    *Class III obesity.  Being overweight can lead to cytopenias through hepatic steatosis.    Body mass index is 40.82 kg/m².  BMI 25 to <30 is overweight  BMI 30 to <35 is class 1 obesity  BMI 35 to <40 is class 2 obesity  BMI 40 or higher is class 3 obesity   Remains overweight.  Ideally, lose weight    *Hypertension- on amlodipine per PCP.  Reports today he forgot to take amlodipine while he was off Xeloda.  Resumed amlodipine this morning.  Blood pressure 146/100.  Asymptomatic.  We will give an additional 0.1 mg of clonidine in clinic today.  Patient states he will be more consistent with taking his blood pressure medication.    Plan:   · CAPEOX x4 cycles (every 3 weeks), to begin 12/27/2022.  Cycle 3 today.  · 0.1 mg clonidine in clinic today for hypertension.  · Appointment made for all 4 cycles including a 2-week appointment after c1d1 (through 2/28/2023)    The patient is on high risk medication that requires close monitoring for toxicity.    Lisa Hernandez, ROSEMARIE  02/07/23

## 2023-02-07 ENCOUNTER — OFFICE VISIT (OUTPATIENT)
Dept: ONCOLOGY | Facility: CLINIC | Age: 58
End: 2023-02-07
Payer: MEDICAID

## 2023-02-07 ENCOUNTER — SPECIALTY PHARMACY (OUTPATIENT)
Dept: PHARMACY | Facility: HOSPITAL | Age: 58
End: 2023-02-07
Payer: MEDICAID

## 2023-02-07 ENCOUNTER — INFUSION (OUTPATIENT)
Dept: ONCOLOGY | Facility: HOSPITAL | Age: 58
End: 2023-02-07
Payer: MEDICAID

## 2023-02-07 VITALS
TEMPERATURE: 97.7 F | DIASTOLIC BLOOD PRESSURE: 100 MMHG | RESPIRATION RATE: 16 BRPM | HEIGHT: 74 IN | OXYGEN SATURATION: 94 % | SYSTOLIC BLOOD PRESSURE: 146 MMHG | WEIGHT: 315 LBS | HEART RATE: 74 BPM | BODY MASS INDEX: 40.43 KG/M2

## 2023-02-07 VITALS — DIASTOLIC BLOOD PRESSURE: 90 MMHG | RESPIRATION RATE: 16 BRPM | SYSTOLIC BLOOD PRESSURE: 150 MMHG | HEART RATE: 70 BPM

## 2023-02-07 DIAGNOSIS — I10 HYPERTENSION, UNSPECIFIED TYPE: ICD-10-CM

## 2023-02-07 DIAGNOSIS — C18.2 CANCER OF ASCENDING COLON: Primary | ICD-10-CM

## 2023-02-07 DIAGNOSIS — Z79.899 HIGH RISK MEDICATION USE: ICD-10-CM

## 2023-02-07 DIAGNOSIS — C18.2 CANCER OF ASCENDING COLON: ICD-10-CM

## 2023-02-07 LAB
ALBUMIN SERPL-MCNC: 4 G/DL (ref 3.5–5.2)
ALBUMIN/GLOB SERPL: 1.3 G/DL
ALP SERPL-CCNC: 78 U/L (ref 39–117)
ALT SERPL W P-5'-P-CCNC: 18 U/L (ref 1–41)
ANION GAP SERPL CALCULATED.3IONS-SCNC: 9.9 MMOL/L (ref 5–15)
AST SERPL-CCNC: 29 U/L (ref 1–40)
BASOPHILS # BLD AUTO: 0.02 10*3/MM3 (ref 0–0.2)
BASOPHILS NFR BLD AUTO: 0.4 % (ref 0–1.5)
BILIRUB SERPL-MCNC: 0.3 MG/DL (ref 0–1.2)
BUN SERPL-MCNC: 9 MG/DL (ref 6–20)
BUN/CREAT SERPL: 10.8 (ref 7–25)
CALCIUM SPEC-SCNC: 9.2 MG/DL (ref 8.6–10.5)
CHLORIDE SERPL-SCNC: 108 MMOL/L (ref 98–107)
CO2 SERPL-SCNC: 24.1 MMOL/L (ref 22–29)
CREAT SERPL-MCNC: 0.83 MG/DL (ref 0.76–1.27)
DEPRECATED RDW RBC AUTO: 61.1 FL (ref 37–54)
EGFRCR SERPLBLD CKD-EPI 2021: 103.4 ML/MIN/1.73
EOSINOPHIL # BLD AUTO: 0.11 10*3/MM3 (ref 0–0.4)
EOSINOPHIL NFR BLD AUTO: 2.3 % (ref 0.3–6.2)
ERYTHROCYTE [DISTWIDTH] IN BLOOD BY AUTOMATED COUNT: 23.9 % (ref 12.3–15.4)
GLOBULIN UR ELPH-MCNC: 3.2 GM/DL
GLUCOSE SERPL-MCNC: 187 MG/DL (ref 65–99)
HCT VFR BLD AUTO: 40.1 % (ref 37.5–51)
HGB BLD-MCNC: 12.5 G/DL (ref 13–17.7)
IMM GRANULOCYTES # BLD AUTO: 0.01 10*3/MM3 (ref 0–0.05)
IMM GRANULOCYTES NFR BLD AUTO: 0.2 % (ref 0–0.5)
LYMPHOCYTES # BLD AUTO: 2.29 10*3/MM3 (ref 0.7–3.1)
LYMPHOCYTES NFR BLD AUTO: 48 % (ref 19.6–45.3)
MCH RBC QN AUTO: 24.9 PG (ref 26.6–33)
MCHC RBC AUTO-ENTMCNC: 31.2 G/DL (ref 31.5–35.7)
MCV RBC AUTO: 79.9 FL (ref 79–97)
MONOCYTES # BLD AUTO: 0.57 10*3/MM3 (ref 0.1–0.9)
MONOCYTES NFR BLD AUTO: 11.9 % (ref 5–12)
NEUTROPHILS NFR BLD AUTO: 1.77 10*3/MM3 (ref 1.7–7)
NEUTROPHILS NFR BLD AUTO: 37.2 % (ref 42.7–76)
NRBC BLD AUTO-RTO: 0 /100 WBC (ref 0–0.2)
PLATELET # BLD AUTO: 169 10*3/MM3 (ref 140–450)
PMV BLD AUTO: 9.4 FL (ref 6–12)
POTASSIUM SERPL-SCNC: 4.3 MMOL/L (ref 3.5–5.2)
PROT SERPL-MCNC: 7.2 G/DL (ref 6–8.5)
RBC # BLD AUTO: 5.02 10*6/MM3 (ref 4.14–5.8)
SODIUM SERPL-SCNC: 142 MMOL/L (ref 136–145)
WBC NRBC COR # BLD: 4.77 10*3/MM3 (ref 3.4–10.8)

## 2023-02-07 PROCEDURE — 25010000002 OXALIPLATIN PER 0.5 MG: Performed by: NURSE PRACTITIONER

## 2023-02-07 PROCEDURE — 80053 COMPREHEN METABOLIC PANEL: CPT | Performed by: INTERNAL MEDICINE

## 2023-02-07 PROCEDURE — 96375 TX/PRO/DX INJ NEW DRUG ADDON: CPT

## 2023-02-07 PROCEDURE — 99214 OFFICE O/P EST MOD 30 MIN: CPT | Performed by: NURSE PRACTITIONER

## 2023-02-07 PROCEDURE — 25010000002 DEXAMETHASONE SODIUM PHOSPHATE 100 MG/10ML SOLUTION: Performed by: NURSE PRACTITIONER

## 2023-02-07 PROCEDURE — 25010000002 ONDANSETRON PER 1 MG: Performed by: NURSE PRACTITIONER

## 2023-02-07 PROCEDURE — 96415 CHEMO IV INFUSION ADDL HR: CPT

## 2023-02-07 PROCEDURE — 96413 CHEMO IV INFUSION 1 HR: CPT

## 2023-02-07 PROCEDURE — 85025 COMPLETE CBC W/AUTO DIFF WBC: CPT | Performed by: INTERNAL MEDICINE

## 2023-02-07 RX ORDER — FAMOTIDINE 10 MG/ML
20 INJECTION, SOLUTION INTRAVENOUS AS NEEDED
Status: CANCELLED | OUTPATIENT
Start: 2023-02-07

## 2023-02-07 RX ORDER — DEXTROSE MONOHYDRATE 50 MG/ML
250 INJECTION, SOLUTION INTRAVENOUS ONCE
Status: COMPLETED | OUTPATIENT
Start: 2023-02-07 | End: 2023-02-07

## 2023-02-07 RX ORDER — DEXTROSE MONOHYDRATE 50 MG/ML
250 INJECTION, SOLUTION INTRAVENOUS ONCE
Status: CANCELLED | OUTPATIENT
Start: 2023-02-07

## 2023-02-07 RX ORDER — DIPHENHYDRAMINE HYDROCHLORIDE 50 MG/ML
50 INJECTION INTRAMUSCULAR; INTRAVENOUS AS NEEDED
Status: CANCELLED | OUTPATIENT
Start: 2023-02-07

## 2023-02-07 RX ORDER — CLONIDINE HYDROCHLORIDE 0.1 MG/1
0.1 TABLET ORAL ONCE
Status: COMPLETED | OUTPATIENT
Start: 2023-02-07 | End: 2023-02-07

## 2023-02-07 RX ADMIN — DEXTROSE MONOHYDRATE 250 ML: 50 INJECTION, SOLUTION INTRAVENOUS at 08:55

## 2023-02-07 RX ADMIN — DEXAMETHASONE SODIUM PHOSPHATE 12 MG: 100 INJECTION INTRAMUSCULAR; INTRAVENOUS at 08:55

## 2023-02-07 RX ADMIN — ONDANSETRON 16 MG: 2 INJECTION INTRAMUSCULAR; INTRAVENOUS at 09:11

## 2023-02-07 RX ADMIN — CLONIDINE HYDROCHLORIDE 0.1 MG: 0.1 TABLET ORAL at 08:41

## 2023-02-07 RX ADMIN — OXALIPLATIN 340 MG: 5 INJECTION, SOLUTION, CONCENTRATE INTRAVENOUS at 09:32

## 2023-02-07 NOTE — PROGRESS NOTES
I have received Xeloda from [facility] and placed it in the [facility] at .

## 2023-02-09 ENCOUNTER — SPECIALTY PHARMACY (OUTPATIENT)
Dept: PHARMACY | Facility: HOSPITAL | Age: 58
End: 2023-02-09
Payer: MEDICAID

## 2023-02-09 NOTE — PROGRESS NOTES
Specialty Note ( Edward)    APRN dictation is noted     He continues to experience the expected cold sensitivity following oxaliplatin infusion.  Eating and drinking adequately.  Bowels moving regularly.  Denies fever or chills.  Denies nausea or vomiting.  Denies signs or symptoms of peripheral neuropathy.     His blood pressure is elevated today at 146/100.  He reports the week he was off Xeloda, he forgot to take his blood pressure medication.  He has resumed it this morning.  We will give him some clonidine in clinic today for blood pressure.  He denies dizziness, vision changes, headache, or chest pain.    Labs reviewed        2/7/2023  Day 1   WBC 3.40 - 10.80 10*3/mm3 4.77   Neutrophils Absolute 1.70 - 7.00 10*3/mm3 1.77   Hemoglobin 13.0 - 17.7 g/dL 12.5 (A)   Hematocrit 37.5 - 51.0 % 40.1   Platelets 140 - 450 10*3/mm3 169   Creatinine 0.76 - 1.27 mg/dL 0.83   BUN 6 - 20 mg/dL 9   Sodium 136 - 145 mmol/L 142   Potassium 3.5 - 5.2 mmol/L 4.3   Glucose 65 - 99 mg/dL 187 (A)   Calcium 8.6 - 10.5 mg/dL 9.2   Albumin 3.5 - 5.2 g/dL 4.0   Total Protein 6.0 - 8.5 g/dL 7.2   AST (SGOT) 1 - 40 U/L 29   ALT (SGPT) 1 - 41 U/L 18   Alkaline Phosphatase 39 - 117 U/L 78   Total Bilirubin 0.0 - 1.2 mg/dL 0.3     He picked up Capecitabine from [facility] center on 2/7/23.  Capecitabine 2150 mg po 14/21 days continues.

## 2023-02-15 NOTE — PROGRESS NOTES
.     REASON FOR FOLLOWUP :   Resected colon cancer, iron deficiency anemia    HISTORY OF PRESENT ILLNESS:  The patient is a 57 y.o. year old male  who is here for follow-up with the above-mentioned history.    Denies neuropathy.  Denies diarrhea as long as he is careful with what he eats.  Occasionally has nausea if he does not eat enough.  When he does have nausea, the antibiotics work.  Denies pain.    Past Medical History:   Diagnosis Date   • Chronic cough     SINCE COVID DIAGNOSIS 9/2021   • Colon cancer (HCC) 11/03/2022    Ascending colon invasive moderately differentiated adenocarcinoma   • Colon polyps 11/03/2022    Transverse colon: fragments of tubular adenoma, rectum: fragments of tubular adenoma and hyperplastic polyp   • History of COVID-19 09/2021   • Hypertension    • Iron deficiency anemia      Past Surgical History:   Procedure Laterality Date   • CHOLECYSTECTOMY WITH INTRAOPERATIVE CHOLANGIOGRAM N/A 11/11/2022    Procedure: LAPAROSCOPIC CHOLECYSTECTOMY;  Surgeon: Nigel Tolentino MD;  Location: [facility];  Service: General;  Laterality: N/A;   • COLON RESECTION Right 11/11/2022    Procedure: COLON RESECTION RIGHT;  Surgeon: Nigel Tolentino MD;  Location: [facility] OR;  Service: General;  Laterality: Right;   • COLONOSCOPY N/A 11/03/2022    Procedure: COLONOSCOPY into cecum with tattoo ink injection, bx's and cold bx/snare polypectomies;  Surgeon: Anup Oconnell MD;  Location: [facility] ENDOSCOPY;  Service: Gastroenterology;  Laterality: N/A;  pre: iron def anemia, heme positive stool  post: polyps, colon mass   • ENDOSCOPY N/A 11/03/2022    Procedure: ESOPHAGOGASTRODUODENOSCOPYr with bx;  Surgeon: Anup Oconnell MD;  Location: [facility] ENDOSCOPY;  Service: Gastroenterology;  Laterality: N/A;  pre:  iron def anemia, heme positive stool  post: gastritis    • INGUINAL HERNIA REPAIR Bilateral 1970   • LACERATION REPAIR Right     THUMB/ HAND       MEDICATIONS    Current Outpatient  Medications:   •  amLODIPine (NORVASC) 5 MG tablet, Take 1 tablet by mouth Daily., Disp: , Rfl:   •  Bismuth 262 MG chewable tablet, Chew 2 tablets 4 (Four) Times a Day., Disp: 112 tablet, Rfl: 0  •  capecitabine (XELODA) 150 MG chemo tablet, Take 1 tablet by mouth 2 (Two) Times a Day with 1 other capecitabine prescription for 2,150 mg total. Take for 14 days on, then off for 7 days., Disp: 28 tablet, Rfl: 0  •  capecitabine (XELODA) 500 MG chemo tablet, Take 4 tablets by mouth 2 (Two) Times a Day with 1 other capecitabine prescription for 2,150 mg total. Take for 14 days on, then off for 7 days., Disp: 112 tablet, Rfl: 0  •  Chlorcyclizine-Pseudoephed (Stahist AD) 25-60 MG tablet, Take 1 tablet by mouth As Needed., Disp: , Rfl:   •  ferrous sulfate 325 (65 FE) MG tablet, Take 1 tablet by mouth Daily With Breakfast., Disp: , Rfl:   •  fluticasone (FLONASE) 50 MCG/ACT nasal spray, 2 sprays into the nostril(s) as directed by provider As Needed., Disp: , Rfl:   •  meloxicam (MOBIC) 7.5 MG tablet, Take 1 tablet by mouth Daily., Disp: , Rfl:   •  ondansetron (Zofran) 4 MG tablet, Take 1 tablet by mouth Every 6 (Six) Hours As Needed for Nausea or Vomiting., Disp: 30 tablet, Rfl: 1  •  pantoprazole (PROTONIX) 40 MG EC tablet, Take 1 tablet by mouth Daily., Disp: , Rfl:   •  tetracycline (ACHROMYCIN,SUMYCIN) 500 MG capsule, Take 1 capsule by mouth 4 (Four) Times a Day., Disp: 56 capsule, Rfl: 0    ALLERGIES:   No Known Allergies    SOCIAL HISTORY:       Social History     Socioeconomic History   • Marital status: Single   Tobacco Use   • Smoking status: Never     Passive exposure: Never   • Smokeless tobacco: Never   Vaping Use   • Vaping Use: Never used   Substance and Sexual Activity   • Alcohol use: Yes     Comment: rarely   • Drug use: Never   • Sexual activity: Defer         FAMILY HISTORY:  Family History   Problem Relation Age of Onset   • Heart failure Mother    • Clotting disorder Father         DVT   •  "Hypertension Brother    • Colon cancer Neg Hx    • Crohn's disease Neg Hx    • Colon polyps Neg Hx    • Irritable bowel syndrome Neg Hx    • Ulcerative colitis Neg Hx    • Malig Hyperthermia Neg Hx        REVIEW OF SYSTEMS:  Review of Systems   Constitutional: Negative for activity change.   HENT: Negative for nosebleeds and trouble swallowing.    Respiratory: Negative for shortness of breath and wheezing.    Cardiovascular: Negative for chest pain and palpitations.   Gastrointestinal: Negative for diarrhea and nausea.   Genitourinary: Negative for dysuria and hematuria.   Musculoskeletal: Negative for arthralgias and myalgias.   Neurological: Negative for seizures and syncope.   Hematological: Negative for adenopathy. Does not bruise/bleed easily.   Psychiatric/Behavioral: Negative for confusion.              Vitals:    02/28/23 0851   BP: 148/98   Pulse: 78   Resp: 18   Temp: 98.2 °F (36.8 °C)   TempSrc: Temporal   SpO2: 97%   Weight: (!) 146 kg (321 lb 9.6 oz)   Height: 188 cm (74.02\")   PainSc: 0-No pain     Current Status 2/28/2023   ECOG score 0      PHYSICAL EXAM:        CONSTITUTIONAL:  Vital signs reviewed.  No distress, looks comfortable.  EYES:  Conjunctiva and lids unremarkable.  PERRLA  EARS,NOSE,MOUTH,THROAT:  Ears and nose appear unremarkable.  Lips, teeth, gums appear unremarkable.  RESPIRATORY:  Normal respiratory effort.  Lungs clear to auscultation bilaterally.  CARDIOVASCULAR:  Normal S1, S2.  No murmurs rubs or gallops.  No significant lower extremity edema.  GASTROINTESTINAL: Abdomen appears unremarkable.  Nontender.  No hepatomegaly.  No splenomegaly.  LYMPHATIC:  No cervical, supraclavicular, axillary lymphadenopathy.  SKIN:  Warm.  No rashes.  PSYCHIATRIC:  Normal judgment and insight.  Normal mood and affect.       RECENT LABS:        WBC   Date Value Ref Range Status   02/28/2023 4.78 3.40 - 10.80 10*3/mm3 Final   02/07/2023 4.77 3.40 - 10.80 10*3/mm3 Final   01/17/2023 5.44 3.40 - 10.80 " 10*3/mm3 Final   01/10/2023 5.67 3.40 - 10.80 10*3/mm3 Final   12/27/2022 8.46 3.40 - 10.80 10*3/mm3 Final   12/05/2022 6.39 3.40 - 10.80 10*3/mm3 Final   11/29/2022 4.09 3.40 - 10.80 10*3/mm3 Final   11/12/2022 13.53 (H) 3.40 - 10.80 10*3/mm3 Final   11/07/2022 7.22 3.40 - 10.80 10*3/mm3 Final   10/20/2022 6.24 3.40 - 10.80 10*3/mm3 Final     Hemoglobin   Date Value Ref Range Status   02/28/2023 12.0 (L) 13.0 - 17.7 g/dL Final   02/07/2023 12.5 (L) 13.0 - 17.7 g/dL Final   01/17/2023 12.4 (L) 13.0 - 17.7 g/dL Final   01/10/2023 12.2 (L) 13.0 - 17.7 g/dL Final   12/27/2022 11.9 (L) 13.0 - 17.7 g/dL Final   12/05/2022 11.1 (L) 13.0 - 17.7 g/dL Final   11/29/2022 10.1 (L) 13.0 - 17.7 g/dL Final   11/12/2022 8.2 (L) 13.0 - 17.7 g/dL Final   11/07/2022 8.4 (L) 13.0 - 17.7 g/dL Final   10/20/2022 9.0 (L) 13.0 - 17.7 g/dL Final     Platelets   Date Value Ref Range Status   02/28/2023 174 140 - 450 10*3/mm3 Final   02/07/2023 169 140 - 450 10*3/mm3 Final   01/17/2023 170 140 - 450 10*3/mm3 Final   01/10/2023 202 140 - 450 10*3/mm3 Final   12/27/2022 285 140 - 450 10*3/mm3 Final   12/05/2022 299 140 - 450 10*3/mm3 Final   11/29/2022 327 140 - 450 10*3/mm3 Final   11/12/2022 321 140 - 450 10*3/mm3 Final   11/07/2022 330 140 - 450 10*3/mm3 Final   10/20/2022 327 140 - 450 10*3/mm3 Final       Assessment & Plan   There are no diagnoses linked to this encounter.      Damián Diaz   *Ascending colon adenocarcinoma  · Discovered on colonoscopy 11/3/2022, Dr. Oconnell, for MINA work-up.  Invasive moderately differentiated adenocarcinoma.  · CT AP 11/7/2022: Circumferential malignancy ascending colon with adjacent mesenteric LAD.  4 mm RML nodule stable from 6/8/2022.  Radiologist felt likely benign but recommended continued follow-up.  Granulomatous calcifications both lower lobes.  · Resection 11/11/2022, Dr. Tolentino: Moderately differentiated adenocarcinoma of cecum, 5.2 cm.  Grade 2.  Invades through muscularis propria into  pericolic fat.  Margins negative.  No perineural invasion or lymphovascular invasion.  One of the 30 nodes positive.  · [id number], stage 3  · No deficiency of MMR proteins.  (pMMR) (consistent with STEFANIA)  · Per NCCN guidelines: Low risk stage III colon cancer preferred regimens are Capeox x3 months versus FOLFOX x3 to 6 months.  Reviewed these options with the patient.  He has chosen Capeox x3 months  · Adjuvant CAPEOX x4 cycles started 12/27/22  · Oxaliplatin D1.  Xeloda 850 mg/m2 per dose (2150 mg rounding for tablet strength), twice per day, D1-14/21 days.  Plan 4 cycles total, which is 3 months of therapy.  · C3 given 2/7/2023.  · C4 of 4 planned cycles: 2/28/2023.  No clinical signs of recurrence.    *RML pulmonary nodule  · Although only seen in hindsight, first seen on CT 6/8/2022, per CT chest 12/20/2022, when it was read as unchanged from 6/8/2022  · Check this again at CT in 3 months    *Iron deficiency anemia  · 6/21/2022: Hb 7.3.  Oral iron daily started.  · Patient states early October 2022 Hb around 8.  · 10/20/2022 (initial consult with me): Ferritin 9.  3% saturation.  Hb 9.  Patient states he cannot tolerate oral iron anymore due to constipation and abdominal cramping.    · Insurance requires Venofer instead of Injectafer  · Completed 1000 mg Venofer on 11/29/2022.  · 12/5/2022: Ferritin 124, 39% saturation.  · Hb 12    *Source of iron deficiency  · Colon cancer found and resected on 11/11/2022    *Microcytosis, likely due to iron deficiency  MCV 83.5, from 77.1, from 78.8    *Lightheadedness, dyspnea on exertion, fatigue.  Hopefully this will improve with IV iron.    *Class III obesity.  Being overweight can lead to cytopenias through hepatic steatosis.    Body mass index is 41.27 kg/m².  BMI 25 to <30 is overweight  BMI 30 to <35 is class 1 obesity  BMI 35 to <40 is class 2 obesity  BMI 40 or higher is class 3 obesity   · Ideal body weight 181 pounds  Remaining overweight.  Ideally, lose  weight    Plan  · MD CBC CMP CEA 3 to 5 weeks (this will be a check after completing chemo)  · MD 3 months.  1 week prior: CT CAP with contrast, CBC, CMP, CEA.  (This will be roughly 6 months from the prechemotherapy CT.  Plan CTs every 6 months x 5 years)  · CAPEOX x4 cycles (every 3 weeks completes today, 2/28/2023 with cycle 4 today.    We did more than just assess side effects of chemo.  For example, I ordered a CT and per his request discussed the benefits of weight loss

## 2023-02-23 DIAGNOSIS — C18.2 CANCER OF ASCENDING COLON: ICD-10-CM

## 2023-02-23 RX ORDER — CAPECITABINE 500 MG/1
2000 TABLET, FILM COATED ORAL 2 TIMES DAILY
Qty: 112 TABLET | Refills: 0 | Status: SHIPPED | OUTPATIENT
Start: 2023-02-23

## 2023-02-23 RX ORDER — CAPECITABINE 150 MG/1
150 TABLET, FILM COATED ORAL 2 TIMES DAILY
Qty: 28 TABLET | Refills: 0 | Status: SHIPPED | OUTPATIENT
Start: 2023-02-23

## 2023-02-24 ENCOUNTER — SPECIALTY PHARMACY (OUTPATIENT)
Dept: PHARMACY | Facility: HOSPITAL | Age: 58
End: 2023-02-24
Payer: MEDICAID

## 2023-02-24 NOTE — PROGRESS NOTES
Mr. Diaz has been informed that his capecitabine prescriptions have been transferred to [id number] due to the nationwide capecitabine shortage and [facility] being out of the medication.     He V/U.    Genna Bennett - Care Coordinator   2/24/2023  10:18 EST

## 2023-02-27 ENCOUNTER — DOCUMENTATION (OUTPATIENT)
Dept: PHARMACY | Facility: HOSPITAL | Age: 58
End: 2023-02-27
Payer: MEDICAID

## 2023-02-27 NOTE — PROGRESS NOTES
Per Roxy Herring with [id number], Mr. Diaz will receive his shipment of Capecitabine on 3/1/23. His copay is $0.    Genna Bennett - Care Coordinator   2/27/2023  15:09 EST

## 2023-02-28 ENCOUNTER — INFUSION (OUTPATIENT)
Dept: ONCOLOGY | Facility: HOSPITAL | Age: 58
End: 2023-02-28
Payer: MEDICAID

## 2023-02-28 ENCOUNTER — OFFICE VISIT (OUTPATIENT)
Dept: ONCOLOGY | Facility: CLINIC | Age: 58
End: 2023-02-28
Payer: MEDICAID

## 2023-02-28 VITALS
SYSTOLIC BLOOD PRESSURE: 148 MMHG | RESPIRATION RATE: 18 BRPM | OXYGEN SATURATION: 97 % | HEART RATE: 78 BPM | HEIGHT: 74 IN | DIASTOLIC BLOOD PRESSURE: 98 MMHG | BODY MASS INDEX: 40.43 KG/M2 | TEMPERATURE: 98.2 F | WEIGHT: 315 LBS

## 2023-02-28 DIAGNOSIS — C18.2 CANCER OF ASCENDING COLON: Primary | ICD-10-CM

## 2023-02-28 DIAGNOSIS — C18.2 CANCER OF ASCENDING COLON: ICD-10-CM

## 2023-02-28 LAB
ALBUMIN SERPL-MCNC: 3.7 G/DL (ref 3.5–5.2)
ALBUMIN/GLOB SERPL: 1.1 G/DL
ALP SERPL-CCNC: 75 U/L (ref 39–117)
ALT SERPL W P-5'-P-CCNC: 24 U/L (ref 1–41)
ANION GAP SERPL CALCULATED.3IONS-SCNC: 9.3 MMOL/L (ref 5–15)
AST SERPL-CCNC: 32 U/L (ref 1–40)
BASOPHILS # BLD AUTO: 0.04 10*3/MM3 (ref 0–0.2)
BASOPHILS NFR BLD AUTO: 0.8 % (ref 0–1.5)
BILIRUB SERPL-MCNC: 0.3 MG/DL (ref 0–1.2)
BUN SERPL-MCNC: 11 MG/DL (ref 6–20)
BUN/CREAT SERPL: 16.2 (ref 7–25)
CALCIUM SPEC-SCNC: 8.7 MG/DL (ref 8.6–10.5)
CHLORIDE SERPL-SCNC: 105 MMOL/L (ref 98–107)
CO2 SERPL-SCNC: 22.7 MMOL/L (ref 22–29)
CREAT SERPL-MCNC: 0.68 MG/DL (ref 0.76–1.27)
DEPRECATED RDW RBC AUTO: 72.5 FL (ref 37–54)
EGFRCR SERPLBLD CKD-EPI 2021: 109.8 ML/MIN/1.73
EOSINOPHIL # BLD AUTO: 0.11 10*3/MM3 (ref 0–0.4)
EOSINOPHIL NFR BLD AUTO: 2.3 % (ref 0.3–6.2)
ERYTHROCYTE [DISTWIDTH] IN BLOOD BY AUTOMATED COUNT: 23.9 % (ref 12.3–15.4)
GLOBULIN UR ELPH-MCNC: 3.3 GM/DL
GLUCOSE SERPL-MCNC: 214 MG/DL (ref 65–99)
HCT VFR BLD AUTO: 39.1 % (ref 37.5–51)
HGB BLD-MCNC: 12 G/DL (ref 13–17.7)
IMM GRANULOCYTES # BLD AUTO: 0.01 10*3/MM3 (ref 0–0.05)
IMM GRANULOCYTES NFR BLD AUTO: 0.2 % (ref 0–0.5)
LYMPHOCYTES # BLD AUTO: 2.33 10*3/MM3 (ref 0.7–3.1)
LYMPHOCYTES NFR BLD AUTO: 48.7 % (ref 19.6–45.3)
MCH RBC QN AUTO: 25.6 PG (ref 26.6–33)
MCHC RBC AUTO-ENTMCNC: 30.7 G/DL (ref 31.5–35.7)
MCV RBC AUTO: 83.5 FL (ref 79–97)
MONOCYTES # BLD AUTO: 0.46 10*3/MM3 (ref 0.1–0.9)
MONOCYTES NFR BLD AUTO: 9.6 % (ref 5–12)
NEUTROPHILS NFR BLD AUTO: 1.83 10*3/MM3 (ref 1.7–7)
NEUTROPHILS NFR BLD AUTO: 38.4 % (ref 42.7–76)
NRBC BLD AUTO-RTO: 0 /100 WBC (ref 0–0.2)
PLATELET # BLD AUTO: 174 10*3/MM3 (ref 140–450)
PMV BLD AUTO: 9.1 FL (ref 6–12)
POTASSIUM SERPL-SCNC: 3.9 MMOL/L (ref 3.5–5.2)
PROT SERPL-MCNC: 7 G/DL (ref 6–8.5)
RBC # BLD AUTO: 4.68 10*6/MM3 (ref 4.14–5.8)
SODIUM SERPL-SCNC: 137 MMOL/L (ref 136–145)
WBC NRBC COR # BLD: 4.78 10*3/MM3 (ref 3.4–10.8)

## 2023-02-28 PROCEDURE — 25010000002 DEXAMETHASONE SODIUM PHOSPHATE 100 MG/10ML SOLUTION: Performed by: INTERNAL MEDICINE

## 2023-02-28 PROCEDURE — 96375 TX/PRO/DX INJ NEW DRUG ADDON: CPT

## 2023-02-28 PROCEDURE — 85025 COMPLETE CBC W/AUTO DIFF WBC: CPT | Performed by: INTERNAL MEDICINE

## 2023-02-28 PROCEDURE — 80053 COMPREHEN METABOLIC PANEL: CPT | Performed by: INTERNAL MEDICINE

## 2023-02-28 PROCEDURE — 99214 OFFICE O/P EST MOD 30 MIN: CPT | Performed by: INTERNAL MEDICINE

## 2023-02-28 PROCEDURE — 25010000002 ONDANSETRON PER 1 MG: Performed by: INTERNAL MEDICINE

## 2023-02-28 PROCEDURE — 96415 CHEMO IV INFUSION ADDL HR: CPT

## 2023-02-28 PROCEDURE — 96413 CHEMO IV INFUSION 1 HR: CPT

## 2023-02-28 PROCEDURE — 25010000002 OXALIPLATIN PER 0.5 MG: Performed by: INTERNAL MEDICINE

## 2023-02-28 RX ORDER — DEXTROSE MONOHYDRATE 50 MG/ML
250 INJECTION, SOLUTION INTRAVENOUS ONCE
Status: COMPLETED | OUTPATIENT
Start: 2023-02-28 | End: 2023-02-28

## 2023-02-28 RX ORDER — FAMOTIDINE 10 MG/ML
20 INJECTION, SOLUTION INTRAVENOUS AS NEEDED
Status: CANCELLED | OUTPATIENT
Start: 2023-02-28

## 2023-02-28 RX ORDER — DEXTROSE MONOHYDRATE 50 MG/ML
250 INJECTION, SOLUTION INTRAVENOUS ONCE
Status: CANCELLED | OUTPATIENT
Start: 2023-02-28

## 2023-02-28 RX ORDER — DIPHENHYDRAMINE HYDROCHLORIDE 50 MG/ML
50 INJECTION INTRAMUSCULAR; INTRAVENOUS AS NEEDED
Status: CANCELLED | OUTPATIENT
Start: 2023-02-28

## 2023-02-28 RX ADMIN — DEXTROSE MONOHYDRATE 250 ML: 50 INJECTION, SOLUTION INTRAVENOUS at 10:05

## 2023-02-28 RX ADMIN — ONDANSETRON 16 MG: 2 INJECTION INTRAMUSCULAR; INTRAVENOUS at 10:06

## 2023-02-28 RX ADMIN — DEXAMETHASONE SODIUM PHOSPHATE 12 MG: 10 INJECTION, SOLUTION INTRAMUSCULAR; INTRAVENOUS at 10:23

## 2023-02-28 RX ADMIN — OXALIPLATIN 340 MG: 5 INJECTION, SOLUTION, CONCENTRATE INTRAVENOUS at 10:43

## 2023-03-03 ENCOUNTER — SPECIALTY PHARMACY (OUTPATIENT)
Dept: PHARMACY | Facility: HOSPITAL | Age: 58
End: 2023-03-03
Payer: MEDICAID

## 2023-03-17 ENCOUNTER — TELEPHONE (OUTPATIENT)
Dept: ONCOLOGY | Facility: CLINIC | Age: 58
End: 2023-03-17

## 2023-03-17 NOTE — TELEPHONE ENCOUNTER
Caller: [id number] - [place], [place] - [address] [facility] - [phone]  - [phone] FX    Relationship: Pharmacy    Best call back number: [phone]    What is the best time to reach you: ANYTIME    Who are you requesting to speak with (clinical staff, provider,  specific staff member): CLINICAL     What was the call regarding: [id number] CALLING TO REORDER PTS capecitabine (XELODA) 150 MG chemo tablet AND capecitabine (XELODA) 500 MG chemo tablet, WHEN I TRIED TO REORDER IT SAYS DR. SINGH WAS REFILLING ON 3/8/2023.    PLEASE CALL [id number] TO DISCUSS REFILLS FOR PT.     Do you require a callback: YES PLEASE

## 2023-03-27 NOTE — PROGRESS NOTES
.     REASON FOR FOLLOWUP :   Resected colon cancer, iron deficiency anemia    HISTORY OF PRESENT ILLNESS:  The patient is a 57 y.o. year old male  who is here for follow-up with the above-mentioned history.    Doing fine.  Basically feels back to normal.  Bowel movements mostly regular.  Taste is fine.  Denies neuropathy.    Past Medical History:   Diagnosis Date   • Chronic cough     SINCE COVID DIAGNOSIS 9/2021   • Colon cancer (HCC) 11/03/2022    Ascending colon invasive moderately differentiated adenocarcinoma   • Colon polyps 11/03/2022    Transverse colon: fragments of tubular adenoma, rectum: fragments of tubular adenoma and hyperplastic polyp   • History of COVID-19 09/2021   • Hypertension    • Iron deficiency anemia      Past Surgical History:   Procedure Laterality Date   • CHOLECYSTECTOMY WITH INTRAOPERATIVE CHOLANGIOGRAM N/A 11/11/2022    Procedure: LAPAROSCOPIC CHOLECYSTECTOMY;  Surgeon: Nigel Tolentino MD;  Location: [facility];  Service: General;  Laterality: N/A;   • COLON RESECTION Right 11/11/2022    Procedure: COLON RESECTION RIGHT;  Surgeon: Nigel Tolentino MD;  Location: [facility] OR;  Service: General;  Laterality: Right;   • COLONOSCOPY N/A 11/03/2022    Procedure: COLONOSCOPY into cecum with tattoo ink injection, bx's and cold bx/snare polypectomies;  Surgeon: Anup Oconnell MD;  Location: [facility] ENDOSCOPY;  Service: Gastroenterology;  Laterality: N/A;  pre: iron def anemia, heme positive stool  post: polyps, colon mass   • ENDOSCOPY N/A 11/03/2022    Procedure: ESOPHAGOGASTRODUODENOSCOPYr with bx;  Surgeon: Anup Oconnell MD;  Location: [facility] ENDOSCOPY;  Service: Gastroenterology;  Laterality: N/A;  pre:  iron def anemia, heme positive stool  post: gastritis    • INGUINAL HERNIA REPAIR Bilateral 1970   • LACERATION REPAIR Right     THUMB/ HAND       MEDICATIONS    Current Outpatient Medications:   •  amLODIPine (NORVASC) 5 MG tablet, Take 1 tablet by mouth Daily.,  Disp: , Rfl:   •  Bismuth 262 MG chewable tablet, Chew 2 tablets 4 (Four) Times a Day., Disp: 112 tablet, Rfl: 0  •  capecitabine (XELODA) 150 MG chemo tablet, Take 1 tablet by mouth 2 (Two) Times a Day with 1 other capecitabine prescription for 2,150 mg total. Take for 14 days on, then off for 7 days., Disp: 28 tablet, Rfl: 0  •  capecitabine (XELODA) 500 MG chemo tablet, Take 4 tablets by mouth 2 (Two) Times a Day with 1 other capecitabine prescription for 2,150 mg total. Take for 14 days on, then off for 7 days., Disp: 112 tablet, Rfl: 0  •  Chlorcyclizine-Pseudoephed (Stahist AD) 25-60 MG tablet, Take 1 tablet by mouth As Needed., Disp: , Rfl:   •  ferrous sulfate 325 (65 FE) MG tablet, Take 1 tablet by mouth Daily With Breakfast., Disp: , Rfl:   •  fluticasone (FLONASE) 50 MCG/ACT nasal spray, 2 sprays into the nostril(s) as directed by provider As Needed., Disp: , Rfl:   •  meloxicam (MOBIC) 7.5 MG tablet, Take 1 tablet by mouth Daily., Disp: , Rfl:   •  ondansetron (Zofran) 4 MG tablet, Take 1 tablet by mouth Every 6 (Six) Hours As Needed for Nausea or Vomiting., Disp: 30 tablet, Rfl: 1  •  pantoprazole (PROTONIX) 40 MG EC tablet, Take 1 tablet by mouth Daily., Disp: , Rfl:   •  tetracycline (ACHROMYCIN,SUMYCIN) 500 MG capsule, Take 1 capsule by mouth 4 (Four) Times a Day., Disp: 56 capsule, Rfl: 0    ALLERGIES:   No Known Allergies    SOCIAL HISTORY:       Social History     Socioeconomic History   • Marital status: Single   Tobacco Use   • Smoking status: Never     Passive exposure: Never   • Smokeless tobacco: Never   Vaping Use   • Vaping Use: Never used   Substance and Sexual Activity   • Alcohol use: Yes     Comment: rarely   • Drug use: Never   • Sexual activity: Defer         FAMILY HISTORY:  Family History   Problem Relation Age of Onset   • Heart failure Mother    • Clotting disorder Father         DVT   • Hypertension Brother    • Colon cancer Neg Hx    • Crohn's disease Neg Hx    • Colon polyps  "Neg Hx    • Irritable bowel syndrome Neg Hx    • Ulcerative colitis Neg Hx    • Malig Hyperthermia Neg Hx        REVIEW OF SYSTEMS:  Review of Systems   Constitutional: Negative for activity change.   HENT: Negative for nosebleeds and trouble swallowing.    Respiratory: Negative for shortness of breath and wheezing.    Cardiovascular: Negative for chest pain and palpitations.   Gastrointestinal: Negative for diarrhea and nausea.   Genitourinary: Negative for dysuria and hematuria.   Musculoskeletal: Negative for arthralgias and myalgias.   Neurological: Negative for seizures and syncope.   Hematological: Negative for adenopathy. Does not bruise/bleed easily.   Psychiatric/Behavioral: Negative for confusion.              Vitals:    03/28/23 0818   BP: 153/100   Pulse: 69   Resp: 16   Temp: 98.2 °F (36.8 °C)   TempSrc: Temporal   SpO2: 95%   Weight: (!) 144 kg (317 lb 6.4 oz)   Height: 188 cm (74.02\")   PainSc: 0-No pain     Current Status 3/28/2023   ECOG score 0      PHYSICAL EXAM:        CONSTITUTIONAL:  Vital signs reviewed.  No distress, looks comfortable.  EYES:  Conjunctiva and lids unremarkable.  PERRLA  EARS,NOSE,MOUTH,THROAT:  Ears and nose appear unremarkable.  Lips, teeth, gums appear unremarkable.  RESPIRATORY:  Normal respiratory effort.  Lungs clear to auscultation bilaterally.  CARDIOVASCULAR:  Normal S1, S2.  No murmurs rubs or gallops.  No significant lower extremity edema.  GASTROINTESTINAL: Abdomen appears unremarkable.  Nontender.  No hepatomegaly.  No splenomegaly.  LYMPHATIC:  No cervical, supraclavicular, axillary lymphadenopathy.  SKIN:  Warm.  No rashes.  PSYCHIATRIC:  Normal judgment and insight.  Normal mood and affect.        RECENT LABS:        WBC   Date Value Ref Range Status   03/28/2023 5.94 3.40 - 10.80 10*3/mm3 Final   02/28/2023 4.78 3.40 - 10.80 10*3/mm3 Final   02/07/2023 4.77 3.40 - 10.80 10*3/mm3 Final   01/17/2023 5.44 3.40 - 10.80 10*3/mm3 Final   01/10/2023 5.67 3.40 - 10.80 " 10*3/mm3 Final   12/27/2022 8.46 3.40 - 10.80 10*3/mm3 Final   12/05/2022 6.39 3.40 - 10.80 10*3/mm3 Final   11/29/2022 4.09 3.40 - 10.80 10*3/mm3 Final   11/12/2022 13.53 (H) 3.40 - 10.80 10*3/mm3 Final   11/07/2022 7.22 3.40 - 10.80 10*3/mm3 Final   10/20/2022 6.24 3.40 - 10.80 10*3/mm3 Final     Hemoglobin   Date Value Ref Range Status   03/28/2023 13.8 13.0 - 17.7 g/dL Final   02/28/2023 12.0 (L) 13.0 - 17.7 g/dL Final   02/07/2023 12.5 (L) 13.0 - 17.7 g/dL Final   01/17/2023 12.4 (L) 13.0 - 17.7 g/dL Final   01/10/2023 12.2 (L) 13.0 - 17.7 g/dL Final   12/27/2022 11.9 (L) 13.0 - 17.7 g/dL Final   12/05/2022 11.1 (L) 13.0 - 17.7 g/dL Final   11/29/2022 10.1 (L) 13.0 - 17.7 g/dL Final   11/12/2022 8.2 (L) 13.0 - 17.7 g/dL Final   11/07/2022 8.4 (L) 13.0 - 17.7 g/dL Final   10/20/2022 9.0 (L) 13.0 - 17.7 g/dL Final     Platelets   Date Value Ref Range Status   03/28/2023 200 140 - 450 10*3/mm3 Final   02/28/2023 174 140 - 450 10*3/mm3 Final   02/07/2023 169 140 - 450 10*3/mm3 Final   01/17/2023 170 140 - 450 10*3/mm3 Final   01/10/2023 202 140 - 450 10*3/mm3 Final   12/27/2022 285 140 - 450 10*3/mm3 Final   12/05/2022 299 140 - 450 10*3/mm3 Final   11/29/2022 327 140 - 450 10*3/mm3 Final   11/12/2022 321 140 - 450 10*3/mm3 Final   11/07/2022 330 140 - 450 10*3/mm3 Final   10/20/2022 327 140 - 450 10*3/mm3 Final       Assessment & Plan   Cancer of ascending colon (HCC)  - CBC & Differential  - Comprehensive Metabolic Panel  - CEA        Damián Diaz   *Ascending colon adenocarcinoma  · Discovered on colonoscopy 11/3/2022, Dr. Oconnell, for MINA work-up.  Invasive moderately differentiated adenocarcinoma.  · CT AP 11/7/2022: Circumferential malignancy ascending colon with adjacent mesenteric LAD.  4 mm RML nodule stable from 6/8/2022.  Radiologist felt likely benign but recommended continued follow-up.  Granulomatous calcifications both lower lobes.  · Resection 11/11/2022, Dr. Tolentino: Moderately differentiated  adenocarcinoma of cecum, 5.2 cm.  Grade 2.  Invades through muscularis propria into pericolic fat.  Margins negative.  No perineural invasion or lymphovascular invasion.  One of the 30 nodes positive.  · [id number], stage 3  · No deficiency of MMR proteins.  (pMMR) (consistent with STEFANIA)  · Per NCCN guidelines: Low risk stage III colon cancer preferred regimens are Capeox x3 months versus FOLFOX x3 to 6 months.  Reviewed these options with the patient.  He has chosen Capeox x3 months  · Adjuvant CAPEOX x4 cycles 12/27/22-2/28/2023  · Oxaliplatin D1.  Xeloda 850 mg/m2 per dose (2150 mg rounding for tablet strength), twice per day, D1-14/21 days.  4 cycles total, which is 3 months of therapy.  No clinical evidence of recurrence.  CT arranged.    *RML pulmonary nodule  · Although only seen in hindsight, first seen on CT 6/8/2022, per CT chest 12/20/2022, when it was read as unchanged from 6/8/2022  · Checking this again at CT in 3 months    *Iron deficiency anemia  · 6/21/2022: Hb 7.3.  Oral iron daily started.  · Patient states early October 2022 Hb around 8.  · 10/20/2022 (initial consult with me): Ferritin 9.  3% saturation.  Hb 9.  Patient states he cannot tolerate oral iron anymore due to constipation and abdominal cramping.    · Insurance requires Venofer instead of Injectafer  · Completed 1000 mg Venofer on 11/29/2022.  · 12/5/2022: Ferritin 124, 39% saturation.  · Hb 13.8    *Source of iron deficiency  · Colon cancer found and resected on 11/11/2022    *Microcytosis, likely due to iron deficiency  MCV 86.1, from 83.5, from 77.1, from 78.8    *Lightheadedness, dyspnea on exertion, fatigue.  Hopefully this will improve with IV iron.    *Class III obesity.  Being overweight can lead to cytopenias through hepatic steatosis.    Body mass index is 40.73 kg/m².  BMI 25 to <30 is overweight  BMI 30 to <35 is class 1 obesity  BMI 35 to <40 is class 2 obesity  BMI 40 or higher is class 3 obesity   · Ideal body weight 181  pounds  Remains overweight.  Ideally, lose weight    Plan  · Completed adjuvant therapy.  · MD end of May.  1 week prior: CT CAP with contrast, CBC, CMP, CEA.  (This will be roughly 6 months from the prechemotherapy CT.  Plan CTs every 6 months x 5 years)  · He asked about his next colonoscopy.  I told him that is typically 1 year from the last, which was 11/3/2022.  I told him this could be done through Dr. Oconnell or Dr. Tolentino    We did more today than just assess side effects of chemotherapy.  For example, reviewed the overall plan including the upcoming CT scans and plans for next colonoscopy.

## 2023-03-28 ENCOUNTER — OFFICE VISIT (OUTPATIENT)
Dept: ONCOLOGY | Facility: CLINIC | Age: 58
End: 2023-03-28
Payer: MEDICAID

## 2023-03-28 ENCOUNTER — LAB (OUTPATIENT)
Dept: OTHER | Facility: HOSPITAL | Age: 58
End: 2023-03-28
Payer: MEDICAID

## 2023-03-28 VITALS
HEIGHT: 74 IN | SYSTOLIC BLOOD PRESSURE: 153 MMHG | WEIGHT: 315 LBS | DIASTOLIC BLOOD PRESSURE: 100 MMHG | RESPIRATION RATE: 16 BRPM | HEART RATE: 69 BPM | BODY MASS INDEX: 40.43 KG/M2 | TEMPERATURE: 98.2 F | OXYGEN SATURATION: 95 %

## 2023-03-28 DIAGNOSIS — C18.2 CANCER OF ASCENDING COLON: ICD-10-CM

## 2023-03-28 DIAGNOSIS — C18.2 CANCER OF ASCENDING COLON: Primary | ICD-10-CM

## 2023-03-28 LAB
ALBUMIN SERPL-MCNC: 3.9 G/DL (ref 3.5–5.2)
ALBUMIN/GLOB SERPL: 1 G/DL
ALP SERPL-CCNC: 73 U/L (ref 39–117)
ALT SERPL W P-5'-P-CCNC: 26 U/L (ref 1–41)
ANION GAP SERPL CALCULATED.3IONS-SCNC: 9.4 MMOL/L (ref 5–15)
AST SERPL-CCNC: 32 U/L (ref 1–40)
BASOPHILS # BLD AUTO: 0.04 10*3/MM3 (ref 0–0.2)
BASOPHILS NFR BLD AUTO: 0.7 % (ref 0–1.5)
BILIRUB SERPL-MCNC: 0.3 MG/DL (ref 0–1.2)
BUN SERPL-MCNC: 10 MG/DL (ref 6–20)
BUN/CREAT SERPL: 12.7 (ref 7–25)
CALCIUM SPEC-SCNC: 9 MG/DL (ref 8.6–10.5)
CEA SERPL-MCNC: 1.66 NG/ML
CHLORIDE SERPL-SCNC: 107 MMOL/L (ref 98–107)
CO2 SERPL-SCNC: 23.6 MMOL/L (ref 22–29)
CREAT SERPL-MCNC: 0.79 MG/DL (ref 0.76–1.27)
DEPRECATED RDW RBC AUTO: 68.8 FL (ref 37–54)
EGFRCR SERPLBLD CKD-EPI 2021: 104.9 ML/MIN/1.73
EOSINOPHIL # BLD AUTO: 0.16 10*3/MM3 (ref 0–0.4)
EOSINOPHIL NFR BLD AUTO: 2.7 % (ref 0.3–6.2)
ERYTHROCYTE [DISTWIDTH] IN BLOOD BY AUTOMATED COUNT: 22 % (ref 12.3–15.4)
GLOBULIN UR ELPH-MCNC: 4.1 GM/DL
GLUCOSE SERPL-MCNC: 118 MG/DL (ref 65–99)
HCT VFR BLD AUTO: 43.5 % (ref 37.5–51)
HGB BLD-MCNC: 13.8 G/DL (ref 13–17.7)
IMM GRANULOCYTES # BLD AUTO: 0.04 10*3/MM3 (ref 0–0.05)
IMM GRANULOCYTES NFR BLD AUTO: 0.7 % (ref 0–0.5)
LYMPHOCYTES # BLD AUTO: 2.87 10*3/MM3 (ref 0.7–3.1)
LYMPHOCYTES NFR BLD AUTO: 48.3 % (ref 19.6–45.3)
MCH RBC QN AUTO: 27.3 PG (ref 26.6–33)
MCHC RBC AUTO-ENTMCNC: 31.7 G/DL (ref 31.5–35.7)
MCV RBC AUTO: 86.1 FL (ref 79–97)
MONOCYTES # BLD AUTO: 0.55 10*3/MM3 (ref 0.1–0.9)
MONOCYTES NFR BLD AUTO: 9.3 % (ref 5–12)
NEUTROPHILS NFR BLD AUTO: 2.28 10*3/MM3 (ref 1.7–7)
NEUTROPHILS NFR BLD AUTO: 38.3 % (ref 42.7–76)
NRBC BLD AUTO-RTO: 0 /100 WBC (ref 0–0.2)
PLATELET # BLD AUTO: 200 10*3/MM3 (ref 140–450)
PMV BLD AUTO: 9.4 FL (ref 6–12)
POTASSIUM SERPL-SCNC: 3.8 MMOL/L (ref 3.5–5.2)
PROT SERPL-MCNC: 8 G/DL (ref 6–8.5)
RBC # BLD AUTO: 5.05 10*6/MM3 (ref 4.14–5.8)
SODIUM SERPL-SCNC: 140 MMOL/L (ref 136–145)
WBC NRBC COR # BLD: 5.94 10*3/MM3 (ref 3.4–10.8)

## 2023-03-28 PROCEDURE — 82378 CARCINOEMBRYONIC ANTIGEN: CPT | Performed by: INTERNAL MEDICINE

## 2023-03-28 PROCEDURE — 1126F AMNT PAIN NOTED NONE PRSNT: CPT | Performed by: INTERNAL MEDICINE

## 2023-03-28 PROCEDURE — 85025 COMPLETE CBC W/AUTO DIFF WBC: CPT | Performed by: INTERNAL MEDICINE

## 2023-03-28 PROCEDURE — 80053 COMPREHEN METABOLIC PANEL: CPT | Performed by: INTERNAL MEDICINE

## 2023-03-28 PROCEDURE — 36415 COLL VENOUS BLD VENIPUNCTURE: CPT

## 2023-03-28 PROCEDURE — 99214 OFFICE O/P EST MOD 30 MIN: CPT | Performed by: INTERNAL MEDICINE

## 2023-03-30 ENCOUNTER — SPECIALTY PHARMACY (OUTPATIENT)
Dept: PHARMACY | Facility: HOSPITAL | Age: 58
End: 2023-03-30
Payer: MEDICAID

## 2023-04-17 ENCOUNTER — TRANSCRIBE ORDERS (OUTPATIENT)
Dept: ULTRASOUND IMAGING | Facility: HOSPITAL | Age: 58
End: 2023-04-17
Payer: MEDICAID

## 2023-04-17 ENCOUNTER — TRANSCRIBE ORDERS (OUTPATIENT)
Dept: CARDIOLOGY | Facility: HOSPITAL | Age: 58
End: 2023-04-17
Payer: MEDICAID

## 2023-04-17 DIAGNOSIS — R06.09 OTHER FORM OF DYSPNEA: Primary | ICD-10-CM

## 2023-04-19 ENCOUNTER — TRANSCRIBE ORDERS (OUTPATIENT)
Dept: PULMONOLOGY | Facility: HOSPITAL | Age: 58
End: 2023-04-19
Payer: MEDICAID

## 2023-04-19 DIAGNOSIS — R06.09 OTHER FORMS OF DYSPNEA: Primary | ICD-10-CM

## 2023-04-25 ENCOUNTER — TRANSCRIBE ORDERS (OUTPATIENT)
Dept: ADMINISTRATIVE | Facility: HOSPITAL | Age: 58
End: 2023-04-25
Payer: MEDICAID

## 2023-04-25 DIAGNOSIS — R06.00 DYSPNEA, UNSPECIFIED TYPE: Primary | ICD-10-CM

## 2023-05-04 ENCOUNTER — HOSPITAL ENCOUNTER (OUTPATIENT)
Dept: PULMONOLOGY | Facility: HOSPITAL | Age: 58
Discharge: HOME | End: 2023-05-04
Payer: MEDICAID

## 2023-05-04 DIAGNOSIS — R06.00 DYSPNEA, UNSPECIFIED TYPE: ICD-10-CM

## 2023-05-04 LAB
BDY SITE: ABNORMAL
HGB BLDA-MCNC: 15.4 G/DL (ref 14–18)
Lab: ABNORMAL
SAO2 % BLDCOA: 92.6 % (ref 94–99)

## 2023-05-04 PROCEDURE — 94729 DIFFUSING CAPACITY: CPT

## 2023-05-04 PROCEDURE — 94010 BREATHING CAPACITY TEST: CPT

## 2023-05-04 PROCEDURE — 82820 HEMOGLOBIN-OXYGEN AFFINITY: CPT

## 2023-05-04 PROCEDURE — 94726 PLETHYSMOGRAPHY LUNG VOLUMES: CPT

## 2023-05-16 ENCOUNTER — HOSPITAL ENCOUNTER (OUTPATIENT)
Dept: CT IMAGING | Facility: HOSPITAL | Age: 58
Discharge: HOME | End: 2023-05-16
Payer: MEDICAID

## 2023-05-16 ENCOUNTER — LAB (OUTPATIENT)
Dept: LAB | Facility: HOSPITAL | Age: 58
End: 2023-05-16
Payer: MEDICAID

## 2023-05-16 DIAGNOSIS — C18.2 CANCER OF ASCENDING COLON: ICD-10-CM

## 2023-05-16 LAB
ALBUMIN SERPL-MCNC: 4.2 G/DL (ref 3.5–5.2)
ALBUMIN/GLOB SERPL: 1.2 G/DL
ALP SERPL-CCNC: 84 U/L (ref 39–117)
ALT SERPL W P-5'-P-CCNC: 21 U/L (ref 1–41)
ANION GAP SERPL CALCULATED.3IONS-SCNC: 10 MMOL/L (ref 5–15)
AST SERPL-CCNC: 24 U/L (ref 1–40)
BASOPHILS # BLD AUTO: 0.05 10*3/MM3 (ref 0–0.2)
BASOPHILS NFR BLD AUTO: 0.6 % (ref 0–1.5)
BILIRUB SERPL-MCNC: 0.3 MG/DL (ref 0–1.2)
BUN SERPL-MCNC: 10 MG/DL (ref 6–20)
BUN/CREAT SERPL: 12.5 (ref 7–25)
CALCIUM SPEC-SCNC: 9.4 MG/DL (ref 8.6–10.5)
CEA SERPL-MCNC: 1.52 NG/ML
CHLORIDE SERPL-SCNC: 107 MMOL/L (ref 98–107)
CO2 SERPL-SCNC: 25 MMOL/L (ref 22–29)
CREAT SERPL-MCNC: 0.8 MG/DL (ref 0.76–1.27)
DEPRECATED RDW RBC AUTO: 48.5 FL (ref 37–54)
EGFRCR SERPLBLD CKD-EPI 2021: 104.5 ML/MIN/1.73
EOSINOPHIL # BLD AUTO: 0.23 10*3/MM3 (ref 0–0.4)
EOSINOPHIL NFR BLD AUTO: 2.8 % (ref 0.3–6.2)
ERYTHROCYTE [DISTWIDTH] IN BLOOD BY AUTOMATED COUNT: 15 % (ref 12.3–15.4)
GLOBULIN UR ELPH-MCNC: 3.6 GM/DL
GLUCOSE SERPL-MCNC: 148 MG/DL (ref 65–99)
HCT VFR BLD AUTO: 42.1 % (ref 37.5–51)
HGB BLD-MCNC: 14.2 G/DL (ref 13–17.7)
IMM GRANULOCYTES # BLD AUTO: 0.04 10*3/MM3 (ref 0–0.05)
IMM GRANULOCYTES NFR BLD AUTO: 0.5 % (ref 0–0.5)
LYMPHOCYTES # BLD AUTO: 3.35 10*3/MM3 (ref 0.7–3.1)
LYMPHOCYTES NFR BLD AUTO: 40.7 % (ref 19.6–45.3)
MCH RBC QN AUTO: 29.8 PG (ref 26.6–33)
MCHC RBC AUTO-ENTMCNC: 33.7 G/DL (ref 31.5–35.7)
MCV RBC AUTO: 88.3 FL (ref 79–97)
MONOCYTES # BLD AUTO: 0.52 10*3/MM3 (ref 0.1–0.9)
MONOCYTES NFR BLD AUTO: 6.3 % (ref 5–12)
NEUTROPHILS NFR BLD AUTO: 4.05 10*3/MM3 (ref 1.7–7)
NEUTROPHILS NFR BLD AUTO: 49.1 % (ref 42.7–76)
NRBC BLD AUTO-RTO: 0 /100 WBC (ref 0–0.2)
PLATELET # BLD AUTO: 213 10*3/MM3 (ref 140–450)
PMV BLD AUTO: 8.9 FL (ref 6–12)
POTASSIUM SERPL-SCNC: 3.9 MMOL/L (ref 3.5–5.2)
PROT SERPL-MCNC: 7.8 G/DL (ref 6–8.5)
RBC # BLD AUTO: 4.77 10*6/MM3 (ref 4.14–5.8)
SODIUM SERPL-SCNC: 142 MMOL/L (ref 136–145)
WBC NRBC COR # BLD: 8.24 10*3/MM3 (ref 3.4–10.8)

## 2023-05-16 PROCEDURE — 82378 CARCINOEMBRYONIC ANTIGEN: CPT | Performed by: INTERNAL MEDICINE

## 2023-05-16 PROCEDURE — 71260 CT THORAX DX C+: CPT

## 2023-05-16 PROCEDURE — 80053 COMPREHEN METABOLIC PANEL: CPT | Performed by: INTERNAL MEDICINE

## 2023-05-16 PROCEDURE — 25510000001 IOPAMIDOL 61 % SOLUTION: Performed by: INTERNAL MEDICINE

## 2023-05-16 PROCEDURE — 85025 COMPLETE CBC W/AUTO DIFF WBC: CPT

## 2023-05-16 PROCEDURE — 0 DIATRIZOATE MEGLUMINE & SODIUM PER 1 ML: Performed by: INTERNAL MEDICINE

## 2023-05-16 PROCEDURE — 36415 COLL VENOUS BLD VENIPUNCTURE: CPT

## 2023-05-16 PROCEDURE — 74177 CT ABD & PELVIS W/CONTRAST: CPT

## 2023-05-16 RX ADMIN — DIATRIZOATE MEGLUMINE AND DIATRIZOATE SODIUM 30 ML: 660; 100 LIQUID ORAL; RECTAL at 09:23

## 2023-05-16 RX ADMIN — IOPAMIDOL 100 ML: 612 INJECTION, SOLUTION INTRAVENOUS at 10:27

## 2023-05-22 ENCOUNTER — HOSPITAL ENCOUNTER (OUTPATIENT)
Dept: CARDIOLOGY | Facility: HOSPITAL | Age: 58
Discharge: HOME | End: 2023-05-22
Admitting: INTERNAL MEDICINE
Payer: MEDICAID

## 2023-05-22 VITALS
SYSTOLIC BLOOD PRESSURE: 141 MMHG | HEART RATE: 74 BPM | DIASTOLIC BLOOD PRESSURE: 77 MMHG | WEIGHT: 315 LBS | BODY MASS INDEX: 40.43 KG/M2 | HEIGHT: 74 IN

## 2023-05-22 DIAGNOSIS — R06.09 OTHER FORM OF DYSPNEA: ICD-10-CM

## 2023-05-22 LAB
AORTIC DIMENSIONLESS INDEX: 0.8 (DI)
ASCENDING AORTA: 4.3 CM
BH CV ECHO LEFT VENTRICLE GLOBAL LONGITUDINAL STRAIN: -17 %
BH CV ECHO MEAS - AO MAX PG: 11.4 MMHG
BH CV ECHO MEAS - AO MEAN PG: 6 MMHG
BH CV ECHO MEAS - AO V2 MAX: 169 CM/SEC
BH CV ECHO MEAS - AO V2 VTI: 30.6 CM
BH CV ECHO MEAS - AVA(I,D): 4 CM2
BH CV ECHO MEAS - EDV(CUBED): 138.4 ML
BH CV ECHO MEAS - EDV(MOD-SP2): 121 ML
BH CV ECHO MEAS - EDV(MOD-SP4): 137 ML
BH CV ECHO MEAS - EF(MOD-BP): 64.8 %
BH CV ECHO MEAS - EF(MOD-SP2): 65.3 %
BH CV ECHO MEAS - EF(MOD-SP4): 64.2 %
BH CV ECHO MEAS - ESV(CUBED): 48.2 ML
BH CV ECHO MEAS - ESV(MOD-SP2): 42 ML
BH CV ECHO MEAS - ESV(MOD-SP4): 49 ML
BH CV ECHO MEAS - FS: 29.6 %
BH CV ECHO MEAS - IVS/LVPW: 1.17 CM
BH CV ECHO MEAS - IVSD: 1.67 CM
BH CV ECHO MEAS - LAT PEAK E' VEL: 9.2 CM/SEC
BH CV ECHO MEAS - LV DIASTOLIC VOL/BSA (35-75): 51.8 CM2
BH CV ECHO MEAS - LV MASS(C)D: 356.9 GRAMS
BH CV ECHO MEAS - LV MAX PG: 5.5 MMHG
BH CV ECHO MEAS - LV MEAN PG: 3 MMHG
BH CV ECHO MEAS - LV SYSTOLIC VOL/BSA (12-30): 18.5 CM2
BH CV ECHO MEAS - LV V1 MAX: 117 CM/SEC
BH CV ECHO MEAS - LV V1 VTI: 24 CM
BH CV ECHO MEAS - LVIDD: 5.2 CM
BH CV ECHO MEAS - LVIDS: 3.6 CM
BH CV ECHO MEAS - LVOT AREA: 5.1 CM2
BH CV ECHO MEAS - LVOT DIAM: 2.6 CM
BH CV ECHO MEAS - LVPWD: 1.43 CM
BH CV ECHO MEAS - MED PEAK E' VEL: 5.5 CM/SEC
BH CV ECHO MEAS - MV A MAX VEL: 81.9 CM/SEC
BH CV ECHO MEAS - MV DEC SLOPE: 294.7 CM/SEC2
BH CV ECHO MEAS - MV DEC TIME: 0.28 MSEC
BH CV ECHO MEAS - MV E MAX VEL: 71 CM/SEC
BH CV ECHO MEAS - MV E/A: 0.87
BH CV ECHO MEAS - MV MAX PG: 3.4 MMHG
BH CV ECHO MEAS - MV MEAN PG: 1.37 MMHG
BH CV ECHO MEAS - MV P1/2T: 79.7 MSEC
BH CV ECHO MEAS - MV V2 VTI: 27.8 CM
BH CV ECHO MEAS - MVA(P1/2T): 2.8 CM2
BH CV ECHO MEAS - MVA(VTI): 4.4 CM2
BH CV ECHO MEAS - PA ACC TIME: 0.1 SEC
BH CV ECHO MEAS - PA PR(ACCEL): 33.1 MMHG
BH CV ECHO MEAS - PA V2 MAX: 118 CM/SEC
BH CV ECHO MEAS - PULM DIAS VEL: 48 CM/SEC
BH CV ECHO MEAS - PULM S/D: 1.27
BH CV ECHO MEAS - PULM SYS VEL: 61 CM/SEC
BH CV ECHO MEAS - QP/QS: 1.33
BH CV ECHO MEAS - RV MAX PG: 4.5 MMHG
BH CV ECHO MEAS - RV V1 MAX: 106.4 CM/SEC
BH CV ECHO MEAS - RV V1 VTI: 19 CM
BH CV ECHO MEAS - RVOT DIAM: 3.3 CM
BH CV ECHO MEAS - SI(MOD-SP2): 29.9 ML/M2
BH CV ECHO MEAS - SI(MOD-SP4): 33.3 ML/M2
BH CV ECHO MEAS - SV(LVOT): 122.6 ML
BH CV ECHO MEAS - SV(MOD-SP2): 79 ML
BH CV ECHO MEAS - SV(MOD-SP4): 88 ML
BH CV ECHO MEAS - SV(RVOT): 163.7 ML
BH CV ECHO MEASUREMENTS AVERAGE E/E' RATIO: 9.66
BH CV XLRA - RV BASE: 2.9 CM
BH CV XLRA - RV LENGTH: 8.9 CM
BH CV XLRA - RV MID: 3 CM
BH CV XLRA - TDI S': 12.1 CM/SEC
LEFT ATRIUM VOLUME INDEX: 25.8 ML/M2
MAXIMAL PREDICTED HEART RATE: 165 BPM
SINUS: 3.6 CM
STRESS TARGET HR: 140 BPM

## 2023-05-22 PROCEDURE — 25510000001 PERFLUTREN (DEFINITY) 8.476 MG IN SODIUM CHLORIDE (PF) 0.9 % 10 ML INJECTION: Performed by: INTERNAL MEDICINE

## 2023-05-22 PROCEDURE — 93306 TTE W/DOPPLER COMPLETE: CPT | Performed by: INTERNAL MEDICINE

## 2023-05-22 PROCEDURE — 93306 TTE W/DOPPLER COMPLETE: CPT

## 2023-05-22 RX ADMIN — SODIUM CHLORIDE 2 ML: 9 INJECTION INTRAMUSCULAR; INTRAVENOUS; SUBCUTANEOUS at 15:25

## 2023-05-22 NOTE — PROGRESS NOTES
.     REASON FOR FOLLOWUP :   Resected colon cancer, iron deficiency anemia    HISTORY OF PRESENT ILLNESS:  The patient is a 57 y.o. year old male  who is here for follow-up with the above-mentioned history.    States over the past few weeks he has had some tingling in his bilateral toes.  However, he has been on his feet more than usual.  This is new for him.  Denies bleeding.  Has some loose bowel movements since colon surgery but no significant change recently in bowel movements.  No nausea.  Eating well.  No pain other than some neck discomfort since waking up this morning    Past Medical History:   Diagnosis Date   • Chronic cough     SINCE COVID DIAGNOSIS 9/2021   • Colon cancer 11/03/2022    Ascending colon invasive moderately differentiated adenocarcinoma   • Colon polyps 11/03/2022    Transverse colon: fragments of tubular adenoma, rectum: fragments of tubular adenoma and hyperplastic polyp   • History of COVID-19 09/2021   • Hypertension    • Iron deficiency anemia      Past Surgical History:   Procedure Laterality Date   • CHOLECYSTECTOMY WITH INTRAOPERATIVE CHOLANGIOGRAM N/A 11/11/2022    Procedure: LAPAROSCOPIC CHOLECYSTECTOMY;  Surgeon: Nigel Tolentino MD;  Location: [facility] OR;  Service: General;  Laterality: N/A;   • COLON RESECTION Right 11/11/2022    Procedure: COLON RESECTION RIGHT;  Surgeon: Nigel Tolentino MD;  Location: [facility] OR;  Service: General;  Laterality: Right;   • COLONOSCOPY N/A 11/03/2022    Procedure: COLONOSCOPY into cecum with tattoo ink injection, bx's and cold bx/snare polypectomies;  Surgeon: Anup Oconnell MD;  Location: [facility] ENDOSCOPY;  Service: Gastroenterology;  Laterality: N/A;  pre: iron def anemia, heme positive stool  post: polyps, colon mass   • ENDOSCOPY N/A 11/03/2022    Procedure: ESOPHAGOGASTRODUODENOSCOPYr with bx;  Surgeon: Anup Oconnell MD;  Location: [facility] ENDOSCOPY;  Service: Gastroenterology;  Laterality: N/A;  pre:  iron def  anemia, heme positive stool  post: gastritis    • INGUINAL HERNIA REPAIR Bilateral 1970   • LACERATION REPAIR Right     THUMB/ HAND       MEDICATIONS    Current Outpatient Medications:   •  amLODIPine (NORVASC) 10 MG tablet, , Disp: , Rfl:   •  Bismuth 262 MG chewable tablet, Chew 2 tablets 4 (Four) Times a Day., Disp: 112 tablet, Rfl: 0  •  capecitabine (XELODA) 150 MG chemo tablet, Take 1 tablet by mouth 2 (Two) Times a Day with 1 other capecitabine prescription for 2,150 mg total. Take for 14 days on, then off for 7 days., Disp: 28 tablet, Rfl: 0  •  capecitabine (XELODA) 500 MG chemo tablet, Take 4 tablets by mouth 2 (Two) Times a Day with 1 other capecitabine prescription for 2,150 mg total. Take for 14 days on, then off for 7 days., Disp: 112 tablet, Rfl: 0  •  Chlorcyclizine-Pseudoephed (Stahist AD) 25-60 MG tablet, Take 1 tablet by mouth As Needed., Disp: , Rfl:   •  ferrous sulfate 325 (65 FE) MG tablet, Take 1 tablet by mouth Daily With Breakfast., Disp: , Rfl:   •  fluticasone (FLONASE) 50 MCG/ACT nasal spray, 2 sprays into the nostril(s) as directed by provider As Needed., Disp: , Rfl:   •  meloxicam (MOBIC) 7.5 MG tablet, Take 1 tablet by mouth Daily., Disp: , Rfl:   •  ondansetron (Zofran) 4 MG tablet, Take 1 tablet by mouth Every 6 (Six) Hours As Needed for Nausea or Vomiting., Disp: 30 tablet, Rfl: 1  •  pantoprazole (PROTONIX) 40 MG EC tablet, Take 1 tablet by mouth Daily., Disp: , Rfl:   •  tetracycline (ACHROMYCIN,SUMYCIN) 500 MG capsule, Take 1 capsule by mouth 4 (Four) Times a Day., Disp: 56 capsule, Rfl: 0  •  amLODIPine (NORVASC) 5 MG tablet, Take 1 tablet by mouth Daily., Disp: , Rfl:     ALLERGIES:   No Known Allergies    SOCIAL HISTORY:       Social History     Socioeconomic History   • Marital status: Single   Tobacco Use   • Smoking status: Never     Passive exposure: Never   • Smokeless tobacco: Never   Vaping Use   • Vaping Use: Never used   Substance and Sexual Activity   • Alcohol  "use: Yes     Comment: rarely   • Drug use: Never   • Sexual activity: Defer         FAMILY HISTORY:  Family History   Problem Relation Age of Onset   • Heart failure Mother    • Clotting disorder Father         DVT   • Hypertension Brother    • Colon cancer Neg Hx    • Crohn's disease Neg Hx    • Colon polyps Neg Hx    • Irritable bowel syndrome Neg Hx    • Ulcerative colitis Neg Hx    • Malig Hyperthermia Neg Hx        REVIEW OF SYSTEMS:  Review of Systems   Constitutional: Negative for activity change.   HENT: Negative for nosebleeds and trouble swallowing.    Respiratory: Negative for shortness of breath and wheezing.    Cardiovascular: Negative for chest pain and palpitations.   Gastrointestinal: Negative for diarrhea and nausea.   Genitourinary: Negative for dysuria and hematuria.   Musculoskeletal: Negative for arthralgias and myalgias.   Neurological: Negative for seizures and syncope.   Hematological: Negative for adenopathy. Does not bruise/bleed easily.   Psychiatric/Behavioral: Negative for confusion.              Vitals:    05/23/23 0918   BP: 137/90   Pulse: 82   Resp: 16   Temp: 98.2 °F (36.8 °C)   TempSrc: Temporal   SpO2: 94%   Weight: (!) 146 kg (322 lb 9.6 oz)   Height: 188 cm (74.02\")   PainSc: 0-No pain         5/23/2023     9:20 AM   Current Status   ECOG score 0      PHYSICAL EXAM:        CONSTITUTIONAL:  Vital signs reviewed.  No distress, looks comfortable.  EYES:  Conjunctiva and lids unremarkable.  PERRLA  EARS,NOSE,MOUTH,THROAT:  Ears and nose appear unremarkable.  Lips, teeth, gums appear unremarkable.  RESPIRATORY:  Normal respiratory effort.  Lungs clear to auscultation bilaterally.  CARDIOVASCULAR:  Normal S1, S2.  No murmurs rubs or gallops.  No significant lower extremity edema.  GASTROINTESTINAL: Abdomen appears unremarkable.  Nontender.  No hepatomegaly.  No splenomegaly.  LYMPHATIC:  No cervical, supraclavicular, axillary lymphadenopathy.  SKIN:  Warm.  No rashes.  PSYCHIATRIC:  " Normal judgment and insight.  Normal mood and affect.          RECENT LABS:        WBC   Date Value Ref Range Status   05/16/2023 8.24 3.40 - 10.80 10*3/mm3 Final   03/28/2023 5.94 3.40 - 10.80 10*3/mm3 Final   02/28/2023 4.78 3.40 - 10.80 10*3/mm3 Final   02/07/2023 4.77 3.40 - 10.80 10*3/mm3 Final   01/17/2023 5.44 3.40 - 10.80 10*3/mm3 Final   01/10/2023 5.67 3.40 - 10.80 10*3/mm3 Final   12/27/2022 8.46 3.40 - 10.80 10*3/mm3 Final   12/05/2022 6.39 3.40 - 10.80 10*3/mm3 Final   11/29/2022 4.09 3.40 - 10.80 10*3/mm3 Final   11/12/2022 13.53 (H) 3.40 - 10.80 10*3/mm3 Final   11/07/2022 7.22 3.40 - 10.80 10*3/mm3 Final   10/20/2022 6.24 3.40 - 10.80 10*3/mm3 Final     Hemoglobin   Date Value Ref Range Status   05/16/2023 14.2 13.0 - 17.7 g/dL Final   03/28/2023 13.8 13.0 - 17.7 g/dL Final   02/28/2023 12.0 (L) 13.0 - 17.7 g/dL Final   02/07/2023 12.5 (L) 13.0 - 17.7 g/dL Final   01/17/2023 12.4 (L) 13.0 - 17.7 g/dL Final   01/10/2023 12.2 (L) 13.0 - 17.7 g/dL Final   12/27/2022 11.9 (L) 13.0 - 17.7 g/dL Final   12/05/2022 11.1 (L) 13.0 - 17.7 g/dL Final   11/29/2022 10.1 (L) 13.0 - 17.7 g/dL Final   11/12/2022 8.2 (L) 13.0 - 17.7 g/dL Final   11/07/2022 8.4 (L) 13.0 - 17.7 g/dL Final   10/20/2022 9.0 (L) 13.0 - 17.7 g/dL Final     Platelets   Date Value Ref Range Status   05/16/2023 213 140 - 450 10*3/mm3 Final   03/28/2023 200 140 - 450 10*3/mm3 Final   02/28/2023 174 140 - 450 10*3/mm3 Final   02/07/2023 169 140 - 450 10*3/mm3 Final   01/17/2023 170 140 - 450 10*3/mm3 Final   01/10/2023 202 140 - 450 10*3/mm3 Final   12/27/2022 285 140 - 450 10*3/mm3 Final   12/05/2022 299 140 - 450 10*3/mm3 Final   11/29/2022 327 140 - 450 10*3/mm3 Final   11/12/2022 321 140 - 450 10*3/mm3 Final   11/07/2022 330 140 - 450 10*3/mm3 Final   10/20/2022 327 140 - 450 10*3/mm3 Final       Assessment & Plan   Cancer of ascending colon  - CT chest w contrast  - CT abdomen pelvis w contrast        Damián Diaz   *Ascending colon  adenocarcinoma  · Discovered on colonoscopy 11/3/2022, Dr. Oconnell, for [facility]A work-up.  Invasive moderately differentiated adenocarcinoma.  · CT AP 11/7/2022: Circumferential malignancy ascending colon with adjacent mesenteric LAD.  4 mm RML nodule stable from 6/8/2022.  Radiologist felt likely benign but recommended continued follow-up.  Granulomatous calcifications both lower lobes.  · Resection 11/11/2022, Dr. Tolentino: Moderately differentiated adenocarcinoma of cecum, 5.2 cm.  Grade 2.  Invades through muscularis propria into pericolic fat.  Margins negative.  No perineural invasion or lymphovascular invasion.  One of the 30 nodes positive.  · [id number], stage 3  · No deficiency of MMR proteins.  (pMMR) (consistent with STEFANIA)  · Per NCCN guidelines: Low risk stage III colon cancer preferred regimens are Capeox x3 months versus FOLFOX x3 to 6 months.  Reviewed these options with the patient.  He has chosen Capeox x3 months  · Adjuvant CAPEOX x4 cycles 12/27/22-2/28/2023  · Oxaliplatin D1.  Xeloda 850 mg/m2 per dose (2150 mg rounding for tablet strength), twice per day, D1-14/21 days.  4 cycles total, which is 3 months of therapy.  · CT 5/16/2023: 2 right mesenteric nodes near the anastomotic sites, anterior to lower pole of right kidney, mildly enlarged.  Radiologist stated these could be recurrence or reactive and recommended either follow-up CT 3 months or PET.  · Discussed with Dr. Tolentino.  He states these nodes cannot be reached by CT-guided needle biopsy.  I asked Dr. Tolentino if a PET scan is done and it shows activity in these 2 mesenteric nodes if he felt the patient should have an operation (knowing reactive nodes can show activity on PET).  Dr. Tolentino recommends not doing a PET scan and instead doing a follow-up CT in 3 months.  I agree.  Discussed all of this with the patient and he agrees as well.      *RML pulmonary nodule  · Although only seen in hindsight, first seen on CT 6/8/2022, per CT chest  12/20/2022, when it was read as unchanged from 6/8/2022  · CT 5/16/2023: No change in 4 mm RML nodule from 6/8/2022.    *Iron deficiency anemia  · 6/21/2022: Hb 7.3.  Oral iron daily started.  · Patient states early October 2022 Hb around 8.  · 10/20/2022 (initial consult with me): Ferritin 9.  3% saturation.  Hb 9.  Patient states he cannot tolerate oral iron anymore due to constipation and abdominal cramping.    · Insurance requires Venofer instead of Injectafer  · Completed 1000 mg Venofer on 11/29/2022.  · 12/5/2022: Ferritin 124, 39% saturation.  · Hb 14.2    *Source of iron deficiency  · Colon cancer found and resected on 11/11/2022    *Microcytosis, likely due to iron deficiency  MCV 88.3 86.1, from 83.5, from 77.1, from 78.8    *Lightheadedness, dyspnea on exertion, fatigue.  Hopefully this will improve with IV iron.    *Class III obesity.  Being overweight can lead to cytopenias through hepatic steatosis.    Body mass index is 41.4 kg/m².  BMI 25 to <30 is overweight  BMI 30 to <35 is class 1 obesity  BMI 35 to <40 is class 2 obesity  BMI 40 or higher is class 3 obesity   · Ideal body weight 181 pounds  Remaining overweight.  Ideally, lose weight    Plan  · MD 3 months.  1 week prior:  · CT CAP with contrast, CBC, CMP, CEA.   · Plan CTs every 6 months x 5 years)  · This upcoming CT will be 3 months from the last instead of the typical 6 months because of the 2 mesenteric nodes seen on May 2023 CT  · He asked about his next colonoscopy.  I told him that is typically 1 year from the last, which was 11/3/2022.  I told him this could be done through Dr. Oconnell or Dr. Tolentino    41 minutes.  Total time.  Same day.

## 2023-05-23 ENCOUNTER — OFFICE VISIT (OUTPATIENT)
Dept: ONCOLOGY | Facility: CLINIC | Age: 58
End: 2023-05-23
Payer: MEDICAID

## 2023-05-23 VITALS
HEIGHT: 74 IN | SYSTOLIC BLOOD PRESSURE: 137 MMHG | RESPIRATION RATE: 16 BRPM | HEART RATE: 82 BPM | TEMPERATURE: 98.2 F | DIASTOLIC BLOOD PRESSURE: 90 MMHG | WEIGHT: 315 LBS | BODY MASS INDEX: 40.43 KG/M2 | OXYGEN SATURATION: 94 %

## 2023-05-23 DIAGNOSIS — C18.2 CANCER OF ASCENDING COLON: Primary | ICD-10-CM

## 2023-05-23 RX ORDER — AMLODIPINE BESYLATE 10 MG/1
TABLET ORAL
COMMUNITY
Start: 2023-05-06

## 2023-09-01 ENCOUNTER — HOSPITAL ENCOUNTER (OUTPATIENT)
Facility: HOSPITAL | Age: 58
Discharge: HOME | End: 2023-09-01
Payer: MEDICAID

## 2023-09-01 ENCOUNTER — LAB (OUTPATIENT)
Facility: HOSPITAL | Age: 58
End: 2023-09-01
Payer: MEDICAID

## 2023-09-01 DIAGNOSIS — C18.2 CANCER OF ASCENDING COLON: ICD-10-CM

## 2023-09-01 LAB
ALBUMIN SERPL-MCNC: 4.5 G/DL (ref 3.5–5.2)
ALBUMIN/GLOB SERPL: 1.3 G/DL
ALP SERPL-CCNC: 86 U/L (ref 39–117)
ALT SERPL W P-5'-P-CCNC: 23 U/L (ref 1–41)
ANION GAP SERPL CALCULATED.3IONS-SCNC: 10.7 MMOL/L (ref 5–15)
AST SERPL-CCNC: 27 U/L (ref 1–40)
BASOPHILS # BLD AUTO: 0.04 10*3/MM3 (ref 0–0.2)
BASOPHILS NFR BLD AUTO: 0.5 % (ref 0–1.5)
BILIRUB SERPL-MCNC: 0.6 MG/DL (ref 0–1.2)
BUN SERPL-MCNC: 10 MG/DL (ref 6–20)
BUN/CREAT SERPL: 10.5 (ref 7–25)
CALCIUM SPEC-SCNC: 9.6 MG/DL (ref 8.6–10.5)
CEA SERPL-MCNC: 1.26 NG/ML
CHLORIDE SERPL-SCNC: 101 MMOL/L (ref 98–107)
CO2 SERPL-SCNC: 26.3 MMOL/L (ref 22–29)
CREAT SERPL-MCNC: 0.95 MG/DL (ref 0.76–1.27)
DEPRECATED RDW RBC AUTO: 45 FL (ref 37–54)
EGFRCR SERPLBLD CKD-EPI 2021: 94.5 ML/MIN/1.73
EOSINOPHIL # BLD AUTO: 0.16 10*3/MM3 (ref 0–0.4)
EOSINOPHIL NFR BLD AUTO: 2.1 % (ref 0.3–6.2)
ERYTHROCYTE [DISTWIDTH] IN BLOOD BY AUTOMATED COUNT: 15.2 % (ref 12.3–15.4)
GLOBULIN UR ELPH-MCNC: 3.5 GM/DL
GLUCOSE SERPL-MCNC: 104 MG/DL (ref 65–99)
HCT VFR BLD AUTO: 43 % (ref 37.5–51)
HGB BLD-MCNC: 14.8 G/DL (ref 13–17.7)
IMM GRANULOCYTES # BLD AUTO: 0.02 10*3/MM3 (ref 0–0.05)
IMM GRANULOCYTES NFR BLD AUTO: 0.3 % (ref 0–0.5)
LYMPHOCYTES # BLD AUTO: 3.37 10*3/MM3 (ref 0.7–3.1)
LYMPHOCYTES NFR BLD AUTO: 43.7 % (ref 19.6–45.3)
MCH RBC QN AUTO: 28.2 PG (ref 26.6–33)
MCHC RBC AUTO-ENTMCNC: 34.4 G/DL (ref 31.5–35.7)
MCV RBC AUTO: 82.1 FL (ref 79–97)
MONOCYTES # BLD AUTO: 0.59 10*3/MM3 (ref 0.1–0.9)
MONOCYTES NFR BLD AUTO: 7.6 % (ref 5–12)
NEUTROPHILS NFR BLD AUTO: 3.54 10*3/MM3 (ref 1.7–7)
NEUTROPHILS NFR BLD AUTO: 45.8 % (ref 42.7–76)
NRBC BLD AUTO-RTO: 0 /100 WBC (ref 0–0.2)
PLATELET # BLD AUTO: 221 10*3/MM3 (ref 140–450)
PMV BLD AUTO: 9 FL (ref 6–12)
POTASSIUM SERPL-SCNC: 3.8 MMOL/L (ref 3.5–5.2)
PROT SERPL-MCNC: 8 G/DL (ref 6–8.5)
RBC # BLD AUTO: 5.24 10*6/MM3 (ref 4.14–5.8)
SODIUM SERPL-SCNC: 138 MMOL/L (ref 136–145)
WBC NRBC COR # BLD: 7.72 10*3/MM3 (ref 3.4–10.8)

## 2023-09-01 PROCEDURE — 0 DIATRIZOATE MEGLUMINE & SODIUM PER 1 ML: Performed by: INTERNAL MEDICINE

## 2023-09-01 PROCEDURE — 80053 COMPREHEN METABOLIC PANEL: CPT | Performed by: INTERNAL MEDICINE

## 2023-09-01 PROCEDURE — 85025 COMPLETE CBC W/AUTO DIFF WBC: CPT | Performed by: INTERNAL MEDICINE

## 2023-09-01 PROCEDURE — 25510000001 IOPAMIDOL 61 % SOLUTION: Performed by: INTERNAL MEDICINE

## 2023-09-01 PROCEDURE — 82378 CARCINOEMBRYONIC ANTIGEN: CPT | Performed by: INTERNAL MEDICINE

## 2023-09-01 PROCEDURE — 74177 CT ABD & PELVIS W/CONTRAST: CPT

## 2023-09-01 PROCEDURE — 71260 CT THORAX DX C+: CPT

## 2023-09-01 RX ADMIN — DIATRIZOATE MEGLUMINE AND DIATRIZOATE SODIUM 30 ML: 600; 100 SOLUTION ORAL; RECTAL at 11:29

## 2023-09-01 RX ADMIN — IOPAMIDOL 100 ML: 612 INJECTION, SOLUTION INTRAVENOUS at 11:29

## 2023-09-02 ENCOUNTER — HOSPITAL ENCOUNTER (EMERGENCY)
Facility: HOSPITAL | Age: 58
Discharge: HOME | End: 2023-09-02
Attending: EMERGENCY MEDICINE
Payer: MEDICAID

## 2023-09-02 ENCOUNTER — APPOINTMENT (OUTPATIENT)
Dept: CT IMAGING | Facility: HOSPITAL | Age: 58
End: 2023-09-02
Payer: MEDICAID

## 2023-09-02 VITALS
HEIGHT: 74 IN | TEMPERATURE: 98.3 F | SYSTOLIC BLOOD PRESSURE: 175 MMHG | RESPIRATION RATE: 22 BRPM | WEIGHT: 312 LBS | BODY MASS INDEX: 40.04 KG/M2 | DIASTOLIC BLOOD PRESSURE: 109 MMHG | HEART RATE: 95 BPM | OXYGEN SATURATION: 95 %

## 2023-09-02 DIAGNOSIS — N20.1 RIGHT URETERAL STONE: Primary | ICD-10-CM

## 2023-09-02 LAB
ALBUMIN SERPL-MCNC: 4.6 G/DL (ref 3.5–5.2)
ALBUMIN/GLOB SERPL: 1.2 G/DL
ALP SERPL-CCNC: 86 U/L (ref 39–117)
ALT SERPL W P-5'-P-CCNC: 23 U/L (ref 1–41)
ANION GAP SERPL CALCULATED.3IONS-SCNC: 14.4 MMOL/L (ref 5–15)
AST SERPL-CCNC: 22 U/L (ref 1–40)
BACTERIA UR QL AUTO: ABNORMAL /HPF
BASOPHILS # BLD AUTO: 0.05 10*3/MM3 (ref 0–0.2)
BASOPHILS NFR BLD AUTO: 0.4 % (ref 0–1.5)
BILIRUB SERPL-MCNC: 0.2 MG/DL (ref 0–1.2)
BILIRUB UR QL STRIP: NEGATIVE
BUN SERPL-MCNC: 11 MG/DL (ref 6–20)
BUN/CREAT SERPL: 8.6 (ref 7–25)
CALCIUM SPEC-SCNC: 9.3 MG/DL (ref 8.6–10.5)
CHLORIDE SERPL-SCNC: 103 MMOL/L (ref 98–107)
CLARITY UR: ABNORMAL
CO2 SERPL-SCNC: 22.6 MMOL/L (ref 22–29)
COLOR UR: YELLOW
CREAT SERPL-MCNC: 1.28 MG/DL (ref 0.76–1.27)
DEPRECATED RDW RBC AUTO: 47.2 FL (ref 37–54)
EGFRCR SERPLBLD CKD-EPI 2021: 66.1 ML/MIN/1.73
EOSINOPHIL # BLD AUTO: 0.15 10*3/MM3 (ref 0–0.4)
EOSINOPHIL NFR BLD AUTO: 1.3 % (ref 0.3–6.2)
ERYTHROCYTE [DISTWIDTH] IN BLOOD BY AUTOMATED COUNT: 15.2 % (ref 12.3–15.4)
GLOBULIN UR ELPH-MCNC: 3.7 GM/DL
GLUCOSE SERPL-MCNC: 116 MG/DL (ref 65–99)
GLUCOSE UR STRIP-MCNC: NEGATIVE MG/DL
HCT VFR BLD AUTO: 45 % (ref 37.5–51)
HGB BLD-MCNC: 14.6 G/DL (ref 13–17.7)
HGB UR QL STRIP.AUTO: ABNORMAL
HOLD SPECIMEN: NORMAL
HOLD SPECIMEN: NORMAL
HYALINE CASTS UR QL AUTO: ABNORMAL /LPF
IMM GRANULOCYTES # BLD AUTO: 0.07 10*3/MM3 (ref 0–0.05)
IMM GRANULOCYTES NFR BLD AUTO: 0.6 % (ref 0–0.5)
KETONES UR QL STRIP: NEGATIVE
LEUKOCYTE ESTERASE UR QL STRIP.AUTO: NEGATIVE
LIPASE SERPL-CCNC: 36 U/L (ref 13–60)
LYMPHOCYTES # BLD AUTO: 4.59 10*3/MM3 (ref 0.7–3.1)
LYMPHOCYTES NFR BLD AUTO: 39.6 % (ref 19.6–45.3)
MCH RBC QN AUTO: 27.9 PG (ref 26.6–33)
MCHC RBC AUTO-ENTMCNC: 32.4 G/DL (ref 31.5–35.7)
MCV RBC AUTO: 85.9 FL (ref 79–97)
MONOCYTES # BLD AUTO: 0.85 10*3/MM3 (ref 0.1–0.9)
MONOCYTES NFR BLD AUTO: 7.3 % (ref 5–12)
NEUTROPHILS NFR BLD AUTO: 5.87 10*3/MM3 (ref 1.7–7)
NEUTROPHILS NFR BLD AUTO: 50.8 % (ref 42.7–76)
NITRITE UR QL STRIP: NEGATIVE
NRBC BLD AUTO-RTO: 0 /100 WBC (ref 0–0.2)
PH UR STRIP.AUTO: 7 [PH] (ref 4.5–8)
PLATELET # BLD AUTO: 239 10*3/MM3 (ref 140–450)
PMV BLD AUTO: 8.9 FL (ref 6–12)
POTASSIUM SERPL-SCNC: 3.7 MMOL/L (ref 3.5–5.2)
PROT SERPL-MCNC: 8.3 G/DL (ref 6–8.5)
PROT UR QL STRIP: ABNORMAL
RBC # BLD AUTO: 5.24 10*6/MM3 (ref 4.14–5.8)
RBC # UR STRIP: ABNORMAL /HPF
REF LAB TEST METHOD: ABNORMAL
SODIUM SERPL-SCNC: 140 MMOL/L (ref 136–145)
SP GR UR STRIP: 1.02 (ref 1–1.03)
SQUAMOUS #/AREA URNS HPF: ABNORMAL /HPF
UROBILINOGEN UR QL STRIP: ABNORMAL
WBC # UR STRIP: ABNORMAL /HPF
WBC NRBC COR # BLD: 11.58 10*3/MM3 (ref 3.4–10.8)
WHOLE BLOOD HOLD COAG: NORMAL
WHOLE BLOOD HOLD SPECIMEN: NORMAL
YEAST URNS QL MICRO: ABNORMAL /HPF

## 2023-09-02 PROCEDURE — 85025 COMPLETE CBC W/AUTO DIFF WBC: CPT

## 2023-09-02 PROCEDURE — 99284 EMERGENCY DEPT VISIT MOD MDM: CPT

## 2023-09-02 PROCEDURE — 81001 URINALYSIS AUTO W/SCOPE: CPT

## 2023-09-02 PROCEDURE — 80053 COMPREHEN METABOLIC PANEL: CPT

## 2023-09-02 PROCEDURE — 96374 THER/PROPH/DIAG INJ IV PUSH: CPT

## 2023-09-02 PROCEDURE — 83690 ASSAY OF LIPASE: CPT

## 2023-09-02 PROCEDURE — 25010000002 KETOROLAC TROMETHAMINE PER 15 MG: Performed by: EMERGENCY MEDICINE

## 2023-09-02 PROCEDURE — 87086 URINE CULTURE/COLONY COUNT: CPT | Performed by: EMERGENCY MEDICINE

## 2023-09-02 PROCEDURE — 96375 TX/PRO/DX INJ NEW DRUG ADDON: CPT

## 2023-09-02 PROCEDURE — 25010000002 ONDANSETRON PER 1 MG: Performed by: EMERGENCY MEDICINE

## 2023-09-02 PROCEDURE — 74176 CT ABD & PELVIS W/O CONTRAST: CPT

## 2023-09-02 RX ORDER — ONDANSETRON HYDROCHLORIDE 8 MG/1
8 TABLET, FILM COATED ORAL EVERY 8 HOURS PRN
Qty: 10 TABLET | Refills: 0 | Status: SHIPPED | OUTPATIENT
Start: 2023-09-02

## 2023-09-02 RX ORDER — ONDANSETRON HYDROCHLORIDE 8 MG/1
8 TABLET, FILM COATED ORAL EVERY 8 HOURS PRN
Qty: 10 TABLET | Refills: 0 | Status: SHIPPED | OUTPATIENT
Start: 2023-09-02 | End: 2023-09-02

## 2023-09-02 RX ORDER — SODIUM CHLORIDE 0.9 % (FLUSH) 0.9 %
10 SYRINGE (ML) INJECTION AS NEEDED
Status: DISCONTINUED | OUTPATIENT
Start: 2023-09-02 | End: 2023-09-03 | Stop reason: HOSPADM

## 2023-09-02 RX ORDER — TAMSULOSIN HYDROCHLORIDE 0.4 MG/1
0.4 CAPSULE ORAL ONCE
Status: COMPLETED | OUTPATIENT
Start: 2023-09-02 | End: 2023-09-02

## 2023-09-02 RX ORDER — TAMSULOSIN HYDROCHLORIDE 0.4 MG/1
1 CAPSULE ORAL DAILY
Qty: 30 CAPSULE | Refills: 0 | Status: SHIPPED | OUTPATIENT
Start: 2023-09-02

## 2023-09-02 RX ORDER — ONDANSETRON 2 MG/ML
4 INJECTION INTRAMUSCULAR; INTRAVENOUS ONCE
Status: COMPLETED | OUTPATIENT
Start: 2023-09-02 | End: 2023-09-02

## 2023-09-02 RX ORDER — TAMSULOSIN HYDROCHLORIDE 0.4 MG/1
1 CAPSULE ORAL DAILY
Qty: 30 CAPSULE | Refills: 0 | Status: SHIPPED | OUTPATIENT
Start: 2023-09-02 | End: 2023-09-02

## 2023-09-02 RX ORDER — KETOROLAC TROMETHAMINE 10 MG/1
10 TABLET, FILM COATED ORAL EVERY 6 HOURS PRN
Qty: 20 TABLET | Refills: 0 | Status: SHIPPED | OUTPATIENT
Start: 2023-09-02 | End: 2023-09-02

## 2023-09-02 RX ORDER — KETOROLAC TROMETHAMINE 30 MG/ML
15 INJECTION, SOLUTION INTRAMUSCULAR; INTRAVENOUS ONCE
Status: COMPLETED | OUTPATIENT
Start: 2023-09-02 | End: 2023-09-02

## 2023-09-02 RX ORDER — KETOROLAC TROMETHAMINE 10 MG/1
10 TABLET, FILM COATED ORAL EVERY 6 HOURS PRN
Qty: 20 TABLET | Refills: 0 | Status: SHIPPED | OUTPATIENT
Start: 2023-09-02

## 2023-09-02 RX ADMIN — KETOROLAC TROMETHAMINE 15 MG: 30 INJECTION, SOLUTION INTRAMUSCULAR; INTRAVENOUS at 19:46

## 2023-09-02 RX ADMIN — ONDANSETRON 4 MG: 2 INJECTION INTRAMUSCULAR; INTRAVENOUS at 18:55

## 2023-09-02 RX ADMIN — TAMSULOSIN HYDROCHLORIDE 0.4 MG: 0.4 CAPSULE ORAL at 22:17

## 2023-09-02 NOTE — ED PROVIDER NOTES
Subjective   History of Present Illness  History of Present Illness    Chief complaint: Side pain    Location: Right flank radiating to the right lower quadrant    Quality/Severity: Severe, sharp    Timing/Onset/Duration: Worse today    Modifying Factors: Nothing makes it better    Associated Symptoms: No headache.  No fever.  Patient's had some chills.  No cough sore throat earache or nasal congestion.  No chest pain or shortness of breath.  No diarrhea or burning when he urinates.  Patient has had nausea and vomiting.  Emesis has been nonbloody.    Narrative: This 57-year-old white male presents complaining of right lower abdominal pain rating to the flank.  Radiates to the right testicle.  This has been going on for the last 3 days.  Worse today.  Patient had a CT done yesterday but does not know the results.  Patient states he had another CT several days ago and it showed a kidney stone.    PCP:Moreno Dallas MD   Review of Systems   Constitutional:  Positive for chills. Negative for fever.   HENT:  Negative for congestion and sore throat.    Respiratory:  Negative for cough and shortness of breath.    Cardiovascular:  Negative for chest pain.   Gastrointestinal:  Positive for nausea. Negative for abdominal pain.   Genitourinary:  Positive for flank pain.   Skin:  Negative for rash.   Neurological:  Negative for headaches.       Past Medical History:   Diagnosis Date    Chronic cough     SINCE COVID DIAGNOSIS 9/2021    Colon cancer 11/03/2022    Ascending colon invasive moderately differentiated adenocarcinoma    Colon polyps 11/03/2022    Transverse colon: fragments of tubular adenoma, rectum: fragments of tubular adenoma and hyperplastic polyp    History of COVID-19 09/2021    Hypertension     Iron deficiency anemia        No Known Allergies    Past Surgical History:   Procedure Laterality Date    CHOLECYSTECTOMY WITH INTRAOPERATIVE CHOLANGIOGRAM N/A 11/11/2022    Procedure: LAPAROSCOPIC CHOLECYSTECTOMY;   Surgeon: Nigel Tolentino MD;  Location: [facility] MAIN OR;  Service: General;  Laterality: N/A;    COLON RESECTION Right 11/11/2022    Procedure: COLON RESECTION RIGHT;  Surgeon: Nigel Tolentino MD;  Location: [facility] MAIN OR;  Service: General;  Laterality: Right;    COLONOSCOPY N/A 11/03/2022    Procedure: COLONOSCOPY into cecum with tattoo ink injection, bx's and cold bx/snare polypectomies;  Surgeon: Anup Oconnell MD;  Location[facility] ENDOSCOPY;  Service: Gastroenterology;  Laterality: N/A;  pre: iron def anemia, heme positive stool  post: polyps, colon mass    ENDOSCOPY N/A 11/03/2022    Procedure: ESOPHAGOGASTRODUODENOSCOPYr with bx;  Surgeon: Anup Oconnell MD;  Location: [facility] ENDOSCOPY;  Service: Gastroenterology;  Laterality: N/A;  pre:  iron def anemia, heme positive stool  post: gastritis     INGUINAL HERNIA REPAIR Bilateral 1970    LACERATION REPAIR Right     THUMB/ HAND       Family History   Problem Relation Age of Onset    Heart failure Mother     Clotting disorder Father         DVT    Hypertension Brother     Colon cancer Neg Hx     Crohn's disease Neg Hx     Colon polyps Neg Hx     Irritable bowel syndrome Neg Hx     Ulcerative colitis Neg Hx     Malig Hyperthermia Neg Hx        Social History     Socioeconomic History    Marital status: Single   Tobacco Use    Smoking status: Never     Passive exposure: Never    Smokeless tobacco: Never   Vaping Use    Vaping Use: Never used   Substance and Sexual Activity    Alcohol use: Yes     Comment: rarely    Drug use: Never    Sexual activity: Defer           Objective   Physical Exam  Vitals (The temperature is 98.3 °F, pulse 95, respirations 22, /109, room air pulse ox 95%.) and nursing note reviewed.   Constitutional:       Appearance: He is well-developed.      Comments: Patient is writhing on the cart, cannot get comfortable   HENT:      Head: Normocephalic and atraumatic.   Cardiovascular:      Rate and Rhythm: Normal rate and  regular rhythm.      Heart sounds: Normal heart sounds. No murmur heard.    No friction rub. No gallop.   Pulmonary:      Effort: Pulmonary effort is normal.      Breath sounds: Normal breath sounds.   Abdominal:      General: Abdomen is flat. Bowel sounds are normal.      Palpations: Abdomen is soft.      Tenderness: There is abdominal tenderness (Mild right lower quadrant tenderness). There is no guarding or rebound.      Hernia: No hernia is present.   Skin:     General: Skin is warm and dry.      Coloration: Skin is pale.   Neurological:      General: No focal deficit present.      Mental Status: He is alert and oriented to person, place, and time.      Motor: No weakness.       Procedures           ED Course  ED Course as of 09/02/23 2007   Sat Sep 02, 2023   1916 The urinalysis shows large blood, trace protein, leukocyte negative, nitrite negative.  The urine microscopic shows 21-30 red blood cells, 6-12 WBCs, 1+ bacteria. [RC]   1916 The lipase is normal at 36. [RC]   1916 The white blood cell count is 11.58.  The CBC is otherwise unremarkable. [RC]   1917 The glucose is 116.  The creatinine is 1.28.  The CMP is otherwise unremarkable. [RC]      ED Course User Index  [RC] Valetne Lozano MD        19:19 EDT, 09/02/23:MPRESSION: CT done 9/1/23.  1. Stable pulmonary nodules, likely benign     2. Stable mesenteric lymph nodes status post right hemicolectomy     3. Additional stable incidental findings as above.         22:11 EDT, 09/02/23: The patient was reassessed.  He feels much better.  His vital signs were reviewed and are stable.  Abdominal exam: Soft, nontender, no masses, positive bowel sounds.    22:11 EDT, 09/02/23: The patient's diagnosis of right ureteral stone was discussed with him.  The patient should strain his urine and if he passes a stone place it in a specimen cup and take that with him to the urologist office.  The patient will be given a prescription for ketorolac, Zofran, and Flomax.   The patient should call Dr. Gallardo's office on Tuesday for a follow-up appointment next week.  The patient should return to the emergency department if there is pain not controlled with the ketorolac, vomiting not controlled with Zofran, fever, worse in any way at all.  All the patient's questions were answered he will be discharged in good condition.                                    Medical Decision Making  Amount and/or Complexity of Data Reviewed  Radiology: ordered.    Risk  Prescription drug management.        Final diagnoses:   Right ureteral stone       ED Disposition  ED Disposition       None            No follow-up provider specified.       Medication List      No changes were made to your prescriptions during this visit.            Valente Lozano MD  09/02/23 [id number]

## 2023-09-03 NOTE — DISCHARGE INSTRUCTIONS
Strain your urine.  If you pass a stone, place it in a specimen cup, and take that with you to Dr. Gallardo's office.  Call Dr. Gallardo's office on Tuesday for a follow-up appointment next week without fail.  Take the Flomax as prescribed.  Take the Zofran as needed as directed.  Take the TauroLock as needed as directed.  Return to the emergency department if there is vomiting not controlled to Zofran, fever, pain not controlled with the ketorolac, worse in any way at all.

## 2023-09-04 LAB — BACTERIA SPEC AEROBE CULT: NO GROWTH

## 2023-09-07 NOTE — PROGRESS NOTES
.     REASON FOR FOLLOWUP :   Resected colon cancer, iron deficiency anemia    HISTORY OF PRESENT ILLNESS:  The patient is a 57 y.o. year old male  who is here for follow-up with the above-mentioned history.    The day after his scheduled CT with us he went to the ER with significant pain and another CT was done which showed a kidney stone.  He states the pain has improved.  At that ER visit his creatinine was 1.28.  He has not had a repeat creatinine therefore we did 1 today which fortunately has returned to normal.    He notes several paternal uncles with death due to CAD.  His recent CT shows coronary calcifications.  He would like this evaluated.  States he has never had a stress test or cath.    No symptoms to suggest recurrence of colon cancer.  No bleeding    Past Medical History:   Diagnosis Date    Chronic cough     SINCE COVID DIAGNOSIS 9/2021    Colon cancer 11/03/2022    Ascending colon invasive moderately differentiated adenocarcinoma    Colon polyps 11/03/2022    Transverse colon: fragments of tubular adenoma, rectum: fragments of tubular adenoma and hyperplastic polyp    History of COVID-19 09/2021    Hypertension     Iron deficiency anemia      Past Surgical History:   Procedure Laterality Date    CHOLECYSTECTOMY WITH INTRAOPERATIVE CHOLANGIOGRAM N/A 11/11/2022    Procedure: LAPAROSCOPIC CHOLECYSTECTOMY;  Surgeon: Nigel Tolentino MD;  Location: [facility] OR;  Service: General;  Laterality: N/A;    COLON RESECTION Right 11/11/2022    Procedure: COLON RESECTION RIGHT;  Surgeon: Nigel Tolentino MD;  Location: [facility] OR;  Service: General;  Laterality: Right;    COLONOSCOPY N/A 11/03/2022    Procedure: COLONOSCOPY into cecum with tattoo ink injection, bx's and cold bx/snare polypectomies;  Surgeon: Anup Oconnell MD;  Location: [facility] ENDOSCOPY;  Service: Gastroenterology;  Laterality: N/A;  pre: iron def anemia, heme positive stool  post: polyps, colon mass    ENDOSCOPY N/A 11/03/2022     Procedure: ESOPHAGOGASTRODUODENOSCOPYr with bx;  Surgeon: Anup Oconnell MD;  Location: [facility] ENDOSCOPY;  Service: Gastroenterology;  Laterality: N/A;  pre:  iron def anemia, heme positive stool  post: gastritis     INGUINAL HERNIA REPAIR Bilateral 1970    LACERATION REPAIR Right     THUMB/ HAND       MEDICATIONS    Current Outpatient Medications:     amLODIPine (NORVASC) 10 MG tablet, , Disp: , Rfl:     Chlorcyclizine-Pseudoephed (Stahist AD) 25-60 MG tablet, Take 1 tablet by mouth As Needed., Disp: , Rfl:     ketorolac (TORADOL) 10 MG tablet, Take 1 tablet by mouth Every 6 (Six) Hours As Needed for Moderate Pain for up to 20 doses., Disp: 20 tablet, Rfl: 0    tamsulosin (FLOMAX) 0.4 MG capsule 24 hr capsule, Take 1 capsule by mouth Daily., Disp: 30 capsule, Rfl: 0    Bismuth 262 MG chewable tablet, Chew 2 tablets 4 (Four) Times a Day., Disp: 112 tablet, Rfl: 0    capecitabine (XELODA) 150 MG chemo tablet, Take 1 tablet by mouth 2 (Two) Times a Day with 1 other capecitabine prescription for 2,150 mg total. Take for 14 days on, then off for 7 days., Disp: 28 tablet, Rfl: 0    capecitabine (XELODA) 500 MG chemo tablet, Take 4 tablets by mouth 2 (Two) Times a Day with 1 other capecitabine prescription for 2,150 mg total. Take for 14 days on, then off for 7 days., Disp: 112 tablet, Rfl: 0    ferrous sulfate 325 (65 FE) MG tablet, Take 1 tablet by mouth Daily With Breakfast., Disp: , Rfl:     fluticasone (FLONASE) 50 MCG/ACT nasal spray, 2 sprays into the nostril(s) as directed by provider As Needed. (Patient not taking: Reported on 9/8/2023), Disp: , Rfl:     ondansetron (ZOFRAN) 8 MG tablet, Take 1 tablet by mouth Every 8 (Eight) Hours As Needed for Nausea or Vomiting. (Patient not taking: Reported on 9/8/2023), Disp: 10 tablet, Rfl: 0    pantoprazole (PROTONIX) 40 MG EC tablet, Take 1 tablet by mouth Daily. (Patient not taking: Reported on 9/8/2023), Disp: , Rfl:     tetracycline (ACHROMYCIN,SUMYCIN) 500 MG  "capsule, Take 1 capsule by mouth 4 (Four) Times a Day., Disp: 56 capsule, Rfl: 0    ALLERGIES:   No Known Allergies    SOCIAL HISTORY:       Social History     Socioeconomic History    Marital status: Single   Tobacco Use    Smoking status: Never     Passive exposure: Never    Smokeless tobacco: Never   Vaping Use    Vaping Use: Never used   Substance and Sexual Activity    Alcohol use: Yes     Comment: rarely    Drug use: Never    Sexual activity: Defer         FAMILY HISTORY:  Family History   Problem Relation Age of Onset    Heart failure Mother     Clotting disorder Father         DVT    Hypertension Brother     Colon cancer Neg Hx     Crohn's disease Neg Hx     Colon polyps Neg Hx     Irritable bowel syndrome Neg Hx     Ulcerative colitis Neg Hx     Malig Hyperthermia Neg Hx        REVIEW OF SYSTEMS:  Review of Systems   Constitutional:  Negative for activity change.   HENT:  Negative for nosebleeds and trouble swallowing.    Respiratory:  Negative for shortness of breath and wheezing.    Cardiovascular:  Negative for chest pain and palpitations.   Gastrointestinal:  Negative for diarrhea and nausea.   Genitourinary:  Negative for dysuria and hematuria.   Musculoskeletal:  Negative for arthralgias and myalgias.   Neurological:  Negative for seizures and syncope.   Hematological:  Negative for adenopathy. Does not bruise/bleed easily.   Psychiatric/Behavioral:  Negative for confusion.             Vitals:    09/08/23 0948   BP: (!) 155/104   Pulse: 75   Resp: 16   Temp: 97.7 °F (36.5 °C)   TempSrc: Temporal   SpO2: 97%   Weight: (!) 147 kg (323 lb)   Height: 188 cm (74.02\")   PainSc:   6   PainLoc: Back         9/8/2023     9:48 AM   Current Status   ECOG score 0      PHYSICAL EXAM:        CONSTITUTIONAL:  Vital signs reviewed.  No distress, looks comfortable.  EYES:  Conjunctiva and lids unremarkable.  PERRLA  EARS,NOSE,MOUTH,THROAT:  Ears and nose appear unremarkable.  Lips, teeth, gums appear " unremarkable.  RESPIRATORY:  Normal respiratory effort.  Lungs clear to auscultation bilaterally.  CARDIOVASCULAR:  Normal S1, S2.  No murmurs rubs or gallops.  No significant lower extremity edema.  GASTROINTESTINAL: Abdomen appears unremarkable.  Nontender.  No hepatomegaly.  No splenomegaly.  LYMPHATIC:  No cervical, supraclavicular, axillary lymphadenopathy.  SKIN:  Warm.  No rashes.  PSYCHIATRIC:  Normal judgment and insight.  Normal mood and affect.     RECENT LABS:        WBC   Date Value Ref Range Status   09/08/2023 7.78 3.40 - 10.80 10*3/mm3 Final   09/02/2023 11.58 (H) 3.40 - 10.80 10*3/mm3 Final   09/01/2023 7.72 3.40 - 10.80 10*3/mm3 Final   05/16/2023 8.24 3.40 - 10.80 10*3/mm3 Final   03/28/2023 5.94 3.40 - 10.80 10*3/mm3 Final   02/28/2023 4.78 3.40 - 10.80 10*3/mm3 Final   02/07/2023 4.77 3.40 - 10.80 10*3/mm3 Final   01/17/2023 5.44 3.40 - 10.80 10*3/mm3 Final   01/10/2023 5.67 3.40 - 10.80 10*3/mm3 Final   12/27/2022 8.46 3.40 - 10.80 10*3/mm3 Final   12/05/2022 6.39 3.40 - 10.80 10*3/mm3 Final   11/29/2022 4.09 3.40 - 10.80 10*3/mm3 Final   11/12/2022 13.53 (H) 3.40 - 10.80 10*3/mm3 Final   11/07/2022 7.22 3.40 - 10.80 10*3/mm3 Final   10/20/2022 6.24 3.40 - 10.80 10*3/mm3 Final     Hemoglobin   Date Value Ref Range Status   09/08/2023 14.6 13.0 - 17.7 g/dL Final   09/02/2023 14.6 13.0 - 17.7 g/dL Final   09/01/2023 14.8 13.0 - 17.7 g/dL Final   05/16/2023 14.2 13.0 - 17.7 g/dL Final   03/28/2023 13.8 13.0 - 17.7 g/dL Final   02/28/2023 12.0 (L) 13.0 - 17.7 g/dL Final   02/07/2023 12.5 (L) 13.0 - 17.7 g/dL Final   01/17/2023 12.4 (L) 13.0 - 17.7 g/dL Final   01/10/2023 12.2 (L) 13.0 - 17.7 g/dL Final   12/27/2022 11.9 (L) 13.0 - 17.7 g/dL Final   12/05/2022 11.1 (L) 13.0 - 17.7 g/dL Final   11/29/2022 10.1 (L) 13.0 - 17.7 g/dL Final   11/12/2022 8.2 (L) 13.0 - 17.7 g/dL Final   11/07/2022 8.4 (L) 13.0 - 17.7 g/dL Final   10/20/2022 9.0 (L) 13.0 - 17.7 g/dL Final     Platelets   Date Value Ref  Range Status   09/08/2023 209 140 - 450 10*3/mm3 Final   09/02/2023 239 140 - 450 10*3/mm3 Final   09/01/2023 221 140 - 450 10*3/mm3 Final   05/16/2023 213 140 - 450 10*3/mm3 Final   03/28/2023 200 140 - 450 10*3/mm3 Final   02/28/2023 174 140 - 450 10*3/mm3 Final   02/07/2023 169 140 - 450 10*3/mm3 Final   01/17/2023 170 140 - 450 10*3/mm3 Final   01/10/2023 202 140 - 450 10*3/mm3 Final   12/27/2022 285 140 - 450 10*3/mm3 Final   12/05/2022 299 140 - 450 10*3/mm3 Final   11/29/2022 327 140 - 450 10*3/mm3 Final   11/12/2022 321 140 - 450 10*3/mm3 Final   11/07/2022 330 140 - 450 10*3/mm3 Final   10/20/2022 327 140 - 450 10*3/mm3 Final       Assessment & Plan   Cancer of ascending colon  - Basic Metabolic Panel  - CT Chest With Contrast Diagnostic  - CT Abdomen Pelvis With Contrast  - Ambulatory Referral to Cardiology  - CBC & Differential  - Comprehensive Metabolic Panel  - CEA  - Basic Metabolic Panel        Damián Diaz   *Ascending colon adenocarcinoma  Discovered on colonoscopy 11/3/2022, Dr. Oconnell, for MINA work-up.  Invasive moderately differentiated adenocarcinoma.  CT AP 11/7/2022: Circumferential malignancy ascending colon with adjacent mesenteric LAD.  4 mm RML nodule stable from 6/8/2022.  Radiologist felt likely benign but recommended continued follow-up.  Granulomatous calcifications both lower lobes.  Resection 11/11/2022, Dr. Tolentino: Moderately differentiated adenocarcinoma of cecum, 5.2 cm.  Grade 2.  Invades through muscularis propria into pericolic fat.  Margins negative.  No perineural invasion or lymphovascular invasion.  One of the 30 nodes positive.  [id number], stage 3  No deficiency of MMR proteins.  (pMMR) (consistent with STEFANIA)  Per NCCN guidelines: Low risk stage III colon cancer preferred regimens are Capeox x3 months versus FOLFOX x3 to 6 months.  Reviewed these options with the patient.  He has chosen Capeox x3 months  Adjuvant CAPEOX x4 cycles 12/27/22-2/28/2023  Oxaliplatin D1.   Xeloda 850 mg/m2 per dose (2150 mg rounding for tablet strength), twice per day, D1-14/21 days.  4 cycles total, which is 3 months of therapy.  CT 5/16/2023: 2 right mesenteric nodes near the anastomotic sites, anterior to lower pole of right kidney, mildly enlarged.  Radiologist stated these could be recurrence or reactive and recommended either follow-up CT 3 months or PET.  Discussed with Dr. Tolentino.  He states these nodes cannot be reached by CT-guided needle biopsy.  I asked Dr. Tolentino if a PET scan is done and it shows activity in these 2 mesenteric nodes if he felt the patient should have an operation (knowing reactive nodes can show activity on PET).  Dr. Tolentino recommends not doing a PET scan and instead doing a follow-up CT in 3 months.  I agree.  Discussed all of this with the patient and he agrees as well.  CT 9/1/2023: Stable pulmonary nodules, no other recurrence.  Specifically the mesenteric nodes are read as stable, 9 and 6 mm in short axis.    *Pulmonary nodules  Although only seen in hindsight, first seen as a RML pulmonary nodule on CT 6/8/2022, per CT chest 12/20/2022, when it was read as unchanged from 6/8/2022  CT 5/16/2023: No change in 4 mm RML nodule from 6/8/2022.  CT 9/1/23: Stable pulmonary nodules.  For example TIFFANY 5 mm nodule.  Note previously only and RML pulmonary nodule was read but now in hindsight the radiologist states stable pulmonary nodules and specifically mentions an TIFFANY nodule at 5 mm.    *The day after his CT went to the ER with right flank pain radiating to RLQ.  CT repeated on 9/2/2023: Right ureteral stone was found.  He was sent home from the ER    *Iron deficiency anemia  6/21/2022: Hb 7.3.  Oral iron daily started.  Patient states early October 2022 Hb around 8.  10/20/2022 (initial consult with me): Ferritin 9.  3% saturation.  Hb 9.  Patient states he cannot tolerate oral iron anymore due to constipation and abdominal cramping.    Insurance requires Venofer  instead of Injectafer  Completed 1000 mg Venofer on 2022.  2022: Ferritin 124, 39% saturation.  Hb 14.2    *Source of iron deficiency  Colon cancer found and resected on 2022    *Microcytosis, likely due to iron deficiency  MCV 84.7    *Lightheadedness, dyspnea on exertion, fatigue.  Hopefully this will improve with IV iron.    *Acute kidney injury  Creatinine was up to 1.28 at early 2023 ER visit for kidney stones.  Because of this, creatinine repeated in our office, 2023 and has returned to baseline, 0.86.    *Coronary calcifications on CT, and several paternal uncles with CAD (dad  at the young age of 41 from PE)  2023: Referred to cardio oncology    *Class III obesity.  Being overweight can lead to cytopenias through hepatic steatosis.    Body mass index is 41.45 kg/m².  BMI 25 to <30 is overweight  BMI 30 to <35 is class 1 obesity  BMI 35 to <40 is class 2 obesity  BMI 40 or higher is class 3 obesity   Ideal body weight 181 pounds  Remains overweight.  Ideally, lose weight    Plan  Because of the new mesenteric nodes on CT 2023, the next CT, 2023 (which was stable) was done 3 months later instead of the typical 6 months later.  I would recommend doing the next CT in 3  months as well and if that looks stable then move back to the 6-month schedule  MD 3 months.  1 week prior:  CT CAP with contrast, CBC, CMP, CEA.   Plan CTs every 6 months x 5 years)  This upcoming CT will be 3 months from the last instead of the typical 6 months because of the 2 mesenteric nodes seen on May 2023 CT  He asked about his next colonoscopy.  I told him that is typically 1 year from the last, which was 11/3/2022.  I told him this could be done through Dr. Oconnell or Dr. Tolentino

## 2023-09-08 ENCOUNTER — LAB (OUTPATIENT)
Dept: OTHER | Facility: HOSPITAL | Age: 58
End: 2023-09-08
Payer: MEDICAID

## 2023-09-08 ENCOUNTER — OFFICE VISIT (OUTPATIENT)
Dept: ONCOLOGY | Facility: CLINIC | Age: 58
End: 2023-09-08
Payer: MEDICAID

## 2023-09-08 ENCOUNTER — APPOINTMENT (OUTPATIENT)
Dept: OTHER | Facility: HOSPITAL | Age: 58
End: 2023-09-08
Payer: MEDICAID

## 2023-09-08 VITALS
WEIGHT: 315 LBS | HEIGHT: 74 IN | SYSTOLIC BLOOD PRESSURE: 155 MMHG | HEART RATE: 75 BPM | BODY MASS INDEX: 40.43 KG/M2 | TEMPERATURE: 97.7 F | DIASTOLIC BLOOD PRESSURE: 104 MMHG | RESPIRATION RATE: 16 BRPM | OXYGEN SATURATION: 97 %

## 2023-09-08 DIAGNOSIS — C18.2 CANCER OF ASCENDING COLON: ICD-10-CM

## 2023-09-08 DIAGNOSIS — C18.2 CANCER OF ASCENDING COLON: Primary | ICD-10-CM

## 2023-09-08 LAB
ALBUMIN SERPL-MCNC: 4.5 G/DL (ref 3.5–5.2)
ALBUMIN/GLOB SERPL: 1.3 G/DL
ALP SERPL-CCNC: 87 U/L (ref 39–117)
ALT SERPL W P-5'-P-CCNC: 21 U/L (ref 1–41)
ANION GAP SERPL CALCULATED.3IONS-SCNC: 9.7 MMOL/L (ref 5–15)
AST SERPL-CCNC: 21 U/L (ref 1–40)
BASOPHILS # BLD AUTO: 0.04 10*3/MM3 (ref 0–0.2)
BASOPHILS NFR BLD AUTO: 0.5 % (ref 0–1.5)
BILIRUB SERPL-MCNC: 0.3 MG/DL (ref 0–1.2)
BUN SERPL-MCNC: 10 MG/DL (ref 6–20)
BUN/CREAT SERPL: 11.6 (ref 7–25)
CALCIUM SPEC-SCNC: 9.5 MG/DL (ref 8.6–10.5)
CEA SERPL-MCNC: 1.38 NG/ML
CHLORIDE SERPL-SCNC: 103 MMOL/L (ref 98–107)
CO2 SERPL-SCNC: 26.3 MMOL/L (ref 22–29)
CREAT SERPL-MCNC: 0.86 MG/DL (ref 0.76–1.27)
DEPRECATED RDW RBC AUTO: 47 FL (ref 37–54)
EGFRCR SERPLBLD CKD-EPI 2021: 102.3 ML/MIN/1.73
EOSINOPHIL # BLD AUTO: 0.14 10*3/MM3 (ref 0–0.4)
EOSINOPHIL NFR BLD AUTO: 1.8 % (ref 0.3–6.2)
ERYTHROCYTE [DISTWIDTH] IN BLOOD BY AUTOMATED COUNT: 15.3 % (ref 12.3–15.4)
GLOBULIN UR ELPH-MCNC: 3.5 GM/DL
GLUCOSE SERPL-MCNC: 118 MG/DL (ref 65–99)
HCT VFR BLD AUTO: 45.5 % (ref 37.5–51)
HGB BLD-MCNC: 14.6 G/DL (ref 13–17.7)
IMM GRANULOCYTES # BLD AUTO: 0.06 10*3/MM3 (ref 0–0.05)
IMM GRANULOCYTES NFR BLD AUTO: 0.8 % (ref 0–0.5)
LYMPHOCYTES # BLD AUTO: 3.11 10*3/MM3 (ref 0.7–3.1)
LYMPHOCYTES NFR BLD AUTO: 40 % (ref 19.6–45.3)
MCH RBC QN AUTO: 27.2 PG (ref 26.6–33)
MCHC RBC AUTO-ENTMCNC: 32.1 G/DL (ref 31.5–35.7)
MCV RBC AUTO: 84.7 FL (ref 79–97)
MONOCYTES # BLD AUTO: 0.6 10*3/MM3 (ref 0.1–0.9)
MONOCYTES NFR BLD AUTO: 7.7 % (ref 5–12)
NEUTROPHILS NFR BLD AUTO: 3.83 10*3/MM3 (ref 1.7–7)
NEUTROPHILS NFR BLD AUTO: 49.2 % (ref 42.7–76)
NRBC BLD AUTO-RTO: 0 /100 WBC (ref 0–0.2)
PLATELET # BLD AUTO: 209 10*3/MM3 (ref 140–450)
PMV BLD AUTO: 8.8 FL (ref 6–12)
POTASSIUM SERPL-SCNC: 4.3 MMOL/L (ref 3.5–5.2)
PROT SERPL-MCNC: 8 G/DL (ref 6–8.5)
RBC # BLD AUTO: 5.37 10*6/MM3 (ref 4.14–5.8)
SODIUM SERPL-SCNC: 139 MMOL/L (ref 136–145)
WBC NRBC COR # BLD: 7.78 10*3/MM3 (ref 3.4–10.8)

## 2023-09-08 PROCEDURE — 82378 CARCINOEMBRYONIC ANTIGEN: CPT | Performed by: INTERNAL MEDICINE

## 2023-09-08 PROCEDURE — 85025 COMPLETE CBC W/AUTO DIFF WBC: CPT | Performed by: INTERNAL MEDICINE

## 2023-09-08 PROCEDURE — 36415 COLL VENOUS BLD VENIPUNCTURE: CPT

## 2023-09-08 PROCEDURE — 99214 OFFICE O/P EST MOD 30 MIN: CPT | Performed by: INTERNAL MEDICINE

## 2023-09-08 PROCEDURE — 80053 COMPREHEN METABOLIC PANEL: CPT | Performed by: INTERNAL MEDICINE

## 2023-09-08 PROCEDURE — 1125F AMNT PAIN NOTED PAIN PRSNT: CPT | Performed by: INTERNAL MEDICINE

## 2023-09-11 ENCOUNTER — TELEPHONE (OUTPATIENT)
Dept: ONCOLOGY | Facility: HOSPITAL | Age: 58
End: 2023-09-11
Payer: MEDICAID

## 2023-09-11 NOTE — TELEPHONE ENCOUNTER
----- Message from Jesus Ansari II, MD sent at 9/8/2023 12:12 PM EDT -----  Hi April,    Please call him and tell him his lab work from today shows normal kidney function.    Thanks!

## 2023-09-22 LAB — CREAT BLDA-MCNC: 1 MG/DL (ref 0.6–1.3)

## 2023-09-27 ENCOUNTER — TELEPHONE (OUTPATIENT)
Dept: CARDIOLOGY | Facility: CLINIC | Age: 58
End: 2023-09-27

## 2023-09-27 ENCOUNTER — OFFICE VISIT (OUTPATIENT)
Dept: CARDIOLOGY | Facility: CLINIC | Age: 58
End: 2023-09-27
Payer: MEDICAID

## 2023-09-27 VITALS
SYSTOLIC BLOOD PRESSURE: 148 MMHG | RESPIRATION RATE: 16 BRPM | OXYGEN SATURATION: 99 % | HEART RATE: 65 BPM | DIASTOLIC BLOOD PRESSURE: 88 MMHG | WEIGHT: 315 LBS | HEIGHT: 74 IN | BODY MASS INDEX: 40.43 KG/M2

## 2023-09-27 DIAGNOSIS — R40.0 DAYTIME SOMNOLENCE: ICD-10-CM

## 2023-09-27 DIAGNOSIS — I71.21 ANEURYSM OF ASCENDING AORTA WITHOUT RUPTURE: ICD-10-CM

## 2023-09-27 DIAGNOSIS — Z82.0 FAMILY HISTORY OF SLEEP APNEA: ICD-10-CM

## 2023-09-27 DIAGNOSIS — R06.83 SNORING: ICD-10-CM

## 2023-09-27 DIAGNOSIS — I25.810 CORONARY ARTERY DISEASE INVOLVING CORONARY BYPASS GRAFT OF NATIVE HEART WITHOUT ANGINA PECTORIS: Primary | ICD-10-CM

## 2023-09-27 DIAGNOSIS — R73.09 ELEVATED GLUCOSE: ICD-10-CM

## 2023-09-27 PROCEDURE — 93000 ELECTROCARDIOGRAM COMPLETE: CPT | Performed by: INTERNAL MEDICINE

## 2023-09-27 PROCEDURE — 99204 OFFICE O/P NEW MOD 45 MIN: CPT | Performed by: INTERNAL MEDICINE

## 2023-09-27 PROCEDURE — 1159F MED LIST DOCD IN RCRD: CPT | Performed by: INTERNAL MEDICINE

## 2023-09-27 PROCEDURE — 1160F RVW MEDS BY RX/DR IN RCRD: CPT | Performed by: INTERNAL MEDICINE

## 2023-09-27 RX ORDER — LOSARTAN POTASSIUM 25 MG/1
25 TABLET ORAL DAILY
Qty: 30 TABLET | Refills: 6 | Status: SHIPPED | OUTPATIENT
Start: 2023-09-27

## 2023-09-27 RX ORDER — MELOXICAM 7.5 MG/1
7.5 TABLET ORAL
COMMUNITY

## 2023-09-27 RX ORDER — ASPIRIN 81 MG/1
81 TABLET ORAL DAILY
Qty: 30 TABLET | Refills: 6 | Status: SHIPPED | OUTPATIENT
Start: 2023-09-27

## 2023-09-27 NOTE — PROGRESS NOTES
"Date of Office Visit: 2023  Encounter Provider: Nusrat Perea MD  Place of Service: [facility] CARDIOLOGY  Patient Name: Damián Diaz  :1965    Chief complaint  Consult requested by Dr. Ansari for coronary artery calcification.    History of Present Illness  Patient is a 57-year-old gentleman with history of colon cancer, hypertension, iron deficiency anemia, family history of premature coronary artery disease (father  at 41 with pulmonary emboli who on a recent CT scan was noted to have coronary artery calcification.  He has a history of colon cancer diagnosed on 2022 he underwent resection at the time.  He completed adjuvant therapy with CapeOx (oxaliplatin and Xeloda).  Follow-up CT shows mildly enlarged mesenteric lymph nodes for which follow-up with pet imaging has been recommended.  This is felt to be stable.  He also has pulmonary nodules which are felt to be stable.    Patient had an echocardiogram on 2022 and then on 2023 with evidence of hypertensive heart disease, normal systolic function and a mildly dilated ascending aorta measuring 4.3 cm.  Last  CT scan of the chest (2023) with contrast showed calcified plaque in the mid LAD to my review of images.  RCA and circumflex less well seen.  Mediastinal vascular was \"felt to be normal\".  Right renal calculi noted with mild hydronephrosis on the right    Patient states he is fairly active walking 2 miles in 20 to 30 minutes 2-3 times a week.  He has had no chest pain, shortness of breath, palpitations, syncope near syncope.  Blood pressure at home as often as it is today when checked randomly.  His device he feels is stable has been checked.    Past Medical History:   Diagnosis Date    Chronic cough     SINCE COVID DIAGNOSIS 2021    Colon cancer 2022    Ascending colon invasive moderately differentiated adenocarcinoma    Colon polyps 2022    Transverse colon: fragments of tubular " adenoma, rectum: fragments of tubular adenoma and hyperplastic polyp    History of COVID-19 09/2021    Hypertension     Iron deficiency anemia      Past Surgical History:   Procedure Laterality Date    CHOLECYSTECTOMY WITH INTRAOPERATIVE CHOLANGIOGRAM N/A 11/11/2022    Procedure: LAPAROSCOPIC CHOLECYSTECTOMY;  Surgeon: Nigel Tolentino MD;  Location: [facility] OR;  Service: General;  Laterality: N/A;    COLON RESECTION Right 11/11/2022    Procedure: COLON RESECTION RIGHT;  Surgeon: Nigel Tolentino MD;  Location: [facility] MAIN OR;  Service: General;  Laterality: Right;    COLONOSCOPY N/A 11/03/2022    Procedure: COLONOSCOPY into cecum with tattoo ink injection, bx's and cold bx/snare polypectomies;  Surgeon: Anup Oconnell MD;  Location: [facility] ENDOSCOPY;  Service: Gastroenterology;  Laterality: N/A;  pre: iron def anemia, heme positive stool  post: polyps, colon mass    ENDOSCOPY N/A 11/03/2022    Procedure: ESOPHAGOGASTRODUODENOSCOPYr with bx;  Surgeon: Anup Oconnell MD;  Location: [facility] ENDOSCOPY;  Service: Gastroenterology;  Laterality: N/A;  pre:  iron def anemia, heme positive stool  post: gastritis     INGUINAL HERNIA REPAIR Bilateral 1970    LACERATION REPAIR Right     THUMB/ HAND     Outpatient Medications Prior to Visit   Medication Sig Dispense Refill    amLODIPine (NORVASC) 10 MG tablet       meloxicam (MOBIC) 7.5 MG tablet Take 1 tablet by mouth.      Bismuth 262 MG chewable tablet Chew 2 tablets 4 (Four) Times a Day. (Patient not taking: Reported on 9/27/2023) 112 tablet 0    capecitabine (XELODA) 150 MG chemo tablet Take 1 tablet by mouth 2 (Two) Times a Day with 1 other capecitabine prescription for 2,150 mg total. Take for 14 days on, then off for 7 days. (Patient not taking: Reported on 9/27/2023) 28 tablet 0    capecitabine (XELODA) 500 MG chemo tablet Take 4 tablets by mouth 2 (Two) Times a Day with 1 other capecitabine prescription for 2,150 mg total. Take for 14 days on, then off  for 7 days. (Patient not taking: Reported on 9/27/2023) 112 tablet 0    Chlorcyclizine-Pseudoephed (Stahist AD) 25-60 MG tablet Take 1 tablet by mouth As Needed. (Patient not taking: Reported on 9/27/2023)      ferrous sulfate 325 (65 FE) MG tablet Take 1 tablet by mouth Daily With Breakfast. (Patient not taking: Reported on 9/27/2023)      fluticasone (FLONASE) 50 MCG/ACT nasal spray 2 sprays into the nostril(s) as directed by provider As Needed. (Patient not taking: Reported on 9/8/2023)      ketorolac (TORADOL) 10 MG tablet Take 1 tablet by mouth Every 6 (Six) Hours As Needed for Moderate Pain for up to 20 doses. (Patient not taking: Reported on 9/27/2023) 20 tablet 0    ondansetron (ZOFRAN) 8 MG tablet Take 1 tablet by mouth Every 8 (Eight) Hours As Needed for Nausea or Vomiting. (Patient not taking: Reported on 9/8/2023) 10 tablet 0    pantoprazole (PROTONIX) 40 MG EC tablet Take 1 tablet by mouth Daily.      tamsulosin (FLOMAX) 0.4 MG capsule 24 hr capsule Take 1 capsule by mouth Daily. (Patient not taking: Reported on 9/27/2023) 30 capsule 0    tetracycline (ACHROMYCIN,SUMYCIN) 500 MG capsule Take 1 capsule by mouth 4 (Four) Times a Day. (Patient not taking: Reported on 9/27/2023) 56 capsule 0     No facility-administered medications prior to visit.       Allergies as of 09/27/2023    (No Known Allergies)     Social History     Socioeconomic History    Marital status: Single   Tobacco Use    Smoking status: Never     Passive exposure: Never    Smokeless tobacco: Never    Tobacco comments:     Caffeine: coffee    Vaping Use    Vaping Use: Never used   Substance and Sexual Activity    Alcohol use: Yes     Comment: rarely    Drug use: Never    Sexual activity: Defer     Family History   Problem Relation Age of Onset    Heart failure Mother     Clotting disorder Father         DVT    Lung cancer Sister     Hypertension Brother     Colon cancer Neg Hx     Crohn's disease Neg Hx     Colon polyps Neg Hx      "Irritable bowel syndrome Neg Hx     Ulcerative colitis Neg Hx     Malig Hyperthermia Neg Hx      Review of Systems   Constitutional: Negative for chills, fever, weight gain and weight loss.   Cardiovascular:  Negative for leg swelling.   Respiratory:  Negative for cough, snoring and wheezing.    Hematologic/Lymphatic: Negative for bleeding problem. Does not bruise/bleed easily.   Skin:  Negative for color change.   Musculoskeletal:  Negative for falls, joint pain and myalgias.   Gastrointestinal:  Negative for melena.   Genitourinary:  Negative for hematuria.   Neurological:  Negative for excessive daytime sleepiness.   Psychiatric/Behavioral:  Negative for depression. The patient is not nervous/anxious.       Objective:     Vitals:    09/27/23 0925   BP: 148/88   BP Location: Left arm   Patient Position: Sitting   Cuff Size: Adult   Pulse: 65   Resp: 16   SpO2: 99%   Weight: (!) 147 kg (325 lb)   Height: 188 cm (74\")     Body mass index is 41.73 kg/m².    Vitals reviewed.   Constitutional:       Appearance: Well-developed. Morbidly obese.   Eyes:      General: No scleral icterus.        Right eye: No discharge.      Conjunctiva/sclera: Conjunctivae normal.      Pupils: Pupils are equal, round, and reactive to light.   HENT:      Head: Normocephalic.      Nose: Nose normal.   Neck:      Thyroid: No thyromegaly.      Vascular: No JVD.   Pulmonary:      Effort: Pulmonary effort is normal. No respiratory distress.      Breath sounds: Normal breath sounds. No wheezing. No rales.   Cardiovascular:      Normal rate. Regular rhythm. Normal S1. Normal S2.       Murmurs: There is no murmur.      No gallop.    Pulses:     Intact distal pulses.      Carotid: 2+ bilaterally.     Radial: 2+ bilaterally.     Femoral: 2+ bilaterally.     Popliteal: 2+ bilaterally.     Dorsalis pedis: 2+ bilaterally.     Posterior tibial: 2+ bilaterally.  Edema:     Peripheral edema absent.   Abdominal:      General: Bowel sounds are normal. There " is no distension.      Palpations: Abdomen is soft.      Tenderness: There is no abdominal tenderness. There is no rebound.   Musculoskeletal: Normal range of motion.         General: No tenderness.      Cervical back: Normal range of motion and neck supple. Skin:     General: Skin is warm and dry.      Findings: No erythema or rash.   Neurological:      Mental Status: Alert and oriented to person, place, and time.   Psychiatric:         Behavior: Behavior normal.         Thought Content: Thought content normal.         Judgment: Judgment normal.     Lab Review:   Lab Results - Last 18 Months   Lab Units 09/08/23  1049 09/02/23  1830   WBC 10*3/mm3 7.78 11.58*   RBC 10*6/mm3 5.37 5.24   HEMOGLOBIN g/dL 14.6 14.6   HEMATOCRIT % 45.5 45.0   MCV fL 84.7 85.9   MCH pg 27.2 27.9   MCHC g/dL 32.1 32.4   RDW % 15.3 15.2   PLATELETS 10*3/mm3 209 239   NEUTROPHIL % % 49.2 50.8   LYMPHOCYTE % % 40.0 39.6   MONOCYTES % % 7.7 7.3   EOSINOPHIL % % 1.8 1.3   BASOPHIL % % 0.5 0.4   NEUTROS ABS 10*3/mm3 3.83 5.87   LYMPHS ABS 10*3/mm3 3.11* 4.59*   MONOS ABS 10*3/mm3 0.60 0.85   EOS ABS 10*3/mm3 0.14 0.15   BASOS ABS 10*3/mm3 0.04 0.05   RDW-SD fl 47.0 47.2   MPV fL 8.8 8.9       Lab Results - Last 18 Months   Lab Units 09/08/23  1049 09/02/23  1830   GLUCOSE mg/dL 118* 116*   BUN mg/dL 10 11   CREATININE mg/dL 0.86 1.28*   SODIUM mmol/L 139 140   POTASSIUM mmol/L 4.3 3.7   CHLORIDE mmol/L 103 103   CO2 mmol/L 26.3 22.6   CALCIUM mg/dL 9.5 9.3   TOTAL PROTEIN g/dL 8.0 8.3   ALBUMIN g/dL 4.5 4.6   ALT (SGPT) U/L 21 23   AST (SGOT) U/L 21 22   ALK PHOS U/L 87 86   BILIRUBIN mg/dL 0.3 0.2   GLOBULIN gm/dL 3.5 3.7   A/G RATIO g/dL 1.3 1.2   BUN / CREAT RATIO  11.6 8.6   ANION GAP mmol/L 9.7 14.4   EGFR mL/min/1.73 102.3 66.1       ECG 12 Lead    Date/Time: 9/27/2023 10:27 PM  Performed by: Nusrat Perea MD  Authorized by: Nusrat Perea MD   Comparison: compared with previous ECG   Similar to previous ECG  Rhythm: sinus  rhythm  Conduction: non-specific intraventricular conduction delay    Clinical impression: abnormal EKG      Assessment:       Diagnosis Plan   1. Coronary artery disease involving coronary bypass graft of native heart without angina pectoris  Stress Test With Myocardial Perfusion One Day    Lipid Panel      2. Aneurysm of ascending aorta without rupture        3. Snoring  Home Sleep Study      4. Family history of sleep apnea  Home Sleep Study      5. Daytime somnolence  Home Sleep Study      6. Elevated glucose  Hemoglobin A1c        Plan:       1.  Coronary artery disease with coronary artery calcification on recent CT imaging.  This is predominantly visualized in the LAD the circumflex and right coronary artery, partially visualized.  We will check an exercise Cardiolite stress test to assess for ischemia and will need further risk factor modification.  Also recommended he start enteric-coated aspirin 81 mg a day.  2.  Aortic valve calcification with family history of bicuspid aortic valve.  Patient's valve leaflet number cannot be determined.  There is also concern of ascending aortic aneurysm.  We will plan on annual physical exams and repeat echo in 2 -3 years.  3.  Probable ascending aortic aneurysm.  Noted by's echo measuring 4.3 cm with good image planes.  However not commented on various CTs performed in the past year.  We will ask radiology to review.  We will plan on repeat CT imaging as a CT angiogram of the chest to assess aortic aneurysm size.  4.  Hypertension with hypertensive heart disease.  We will add losartan milligrams daily he will start tracking his pressures.  We will blood pressure less than 1 130/80.  3.  Unknown lipid status.  We will check a lipid panel  4.  Colon cancer status post resection with chemotherapy.  5.  Elevated glucose.  Check hemoglobin A1c  7.  Probable sleep apnea.  We will check a home sleep study.      Time Spent: I spent 60 minutes caring for Damián on this date of  service. This time includes time spent by me in the following activities: preparing for the visit, reviewing tests, obtaining and/or reviewing a separately obtained history, performing a medically appropriate examination and/or evaluation, counseling and educating the patient/family/caregiver, ordering medications, tests, or procedures, documenting information in the medical record, and independently interpreting results and communicating that information with the patient/family/caregiver.   I spent 1 minutes on the separately reported service of ECG. This time is not included in the time used to support the E/M service also reported today.        Your medication list            Accurate as of September 27, 2023 10:32 PM. If you have any questions, ask your nurse or doctor.                START taking these medications        Instructions Last Dose Given Next Dose Due   aspirin 81 MG EC tablet  Started by: Nusrat Perea MD      Take 1 tablet by mouth Daily.       losartan 25 MG tablet  Commonly known as: Cozaar  Started by: Nusrat Perea MD      Take 1 tablet by mouth Daily.              CONTINUE taking these medications        Instructions Last Dose Given Next Dose Due   amLODIPine 10 MG tablet  Commonly known as: NORVASC           Bismuth 262 MG chewable tablet      Chew 2 tablets 4 (Four) Times a Day.       capecitabine 150 MG chemo tablet  Commonly known as: XELODA      Take 1 tablet by mouth 2 (Two) Times a Day with 1 other capecitabine prescription for 2,150 mg total. Take for 14 days on, then off for 7 days.       capecitabine 500 MG chemo tablet  Commonly known as: XELODA      Take 4 tablets by mouth 2 (Two) Times a Day with 1 other capecitabine prescription for 2,150 mg total. Take for 14 days on, then off for 7 days.       ferrous sulfate 325 (65 FE) MG tablet      Take 1 tablet by mouth Daily With Breakfast.       fluticasone 50 MCG/ACT nasal spray  Commonly known as: FLONASE      2 sprays into the  nostril(s) as directed by provider As Needed.       ketorolac 10 MG tablet  Commonly known as: TORADOL      Take 1 tablet by mouth Every 6 (Six) Hours As Needed for Moderate Pain for up to 20 doses.       meloxicam 7.5 MG tablet  Commonly known as: MOBIC      Take 1 tablet by mouth.       ondansetron 8 MG tablet  Commonly known as: ZOFRAN      Take 1 tablet by mouth Every 8 (Eight) Hours As Needed for Nausea or Vomiting.       pantoprazole 40 MG EC tablet  Commonly known as: PROTONIX      Take 1 tablet by mouth Daily.       Stahist AD 25-60 MG tablet  Generic drug: Chlorcyclizine-Pseudoephed      Take 1 tablet by mouth As Needed.       tamsulosin 0.4 MG capsule 24 hr capsule  Commonly known as: FLOMAX      Take 1 capsule by mouth Daily.       tetracycline 500 MG capsule  Commonly known as: ACHROMYCIN,SUMYCIN      Take 1 capsule by mouth 4 (Four) Times a Day.                 Where to Get Your Medications        These medications were sent to [place], [place] - [address] [phone] [facility] [phone]   [address] [facility] [place] [place] [address]      Phone: [phone]   aspirin 81 MG EC tablet  losartan 25 MG tablet         Patient is no longer taking -.  I corrected the med list to reflect this.  I did not stop these medications.      Dictated utilizing Dragon dictation

## 2023-09-27 NOTE — TELEPHONE ENCOUNTER
Please ask radiology to review prior Ct chest for thoracic aortic aneurysm. Need a response by end of the week if possible

## 2023-09-28 NOTE — TELEPHONE ENCOUNTER
I talked to patient and Dr. Ansari regarding CT findings now verifying ascending aortic and arch aneurysm.  Recommended to patient he avoid lifting more than 30 pounds.  We will change over from an exercise cardiac stress test a Lexiscan Cardiolite stress test to assess further for obstructive coronary disease with known coronary calcification.  In addition I recommended patient have screening of primary family relatives for aortic aneurysmal disease.  We will also ask radiology to review prior CT scans in the past year if this aneurysm has increased in size in the past year as implied by echo findings.  Echo from 7/2022 with an ascending aortic size of 3.9 and by 5/2023 was 4.3 cm.  Patient says blood pressure is already improved significantly with addition of valsartan and he will call additional readings    Jessa, please ask radiology to review prior CTs of the past year to determine if the aortic aneurysm has increased in the past year.  It did appear to do so by echo.

## 2023-10-09 ENCOUNTER — HOSPITAL ENCOUNTER (OUTPATIENT)
Dept: NUCLEAR MEDICINE | Facility: HOSPITAL | Age: 58
Discharge: HOME | End: 2023-10-09
Payer: MEDICAID

## 2023-10-09 ENCOUNTER — HOSPITAL ENCOUNTER (OUTPATIENT)
Dept: CARDIOLOGY | Facility: HOSPITAL | Age: 58
Discharge: HOME | End: 2023-10-09
Payer: MEDICAID

## 2023-10-09 DIAGNOSIS — I25.810 CORONARY ARTERY DISEASE INVOLVING CORONARY BYPASS GRAFT OF NATIVE HEART WITHOUT ANGINA PECTORIS: ICD-10-CM

## 2023-10-09 LAB
BH CV REST NUCLEAR ISOTOPE DOSE: 11.2 MCI
BH CV STRESS BP STAGE 1: NORMAL
BH CV STRESS COMMENTS STAGE 1: NORMAL
BH CV STRESS DOSE REGADENOSON STAGE 1: 0.4
BH CV STRESS DURATION MIN STAGE 1: 0
BH CV STRESS DURATION SEC STAGE 1: 30
BH CV STRESS HR STAGE 1: 66
BH CV STRESS NUCLEAR ISOTOPE DOSE: 32.6 MCI
BH CV STRESS O2 STAGE 1: 96
BH CV STRESS PROTOCOL 1: NORMAL
BH CV STRESS RECOVERY BP: NORMAL MMHG
BH CV STRESS RECOVERY HR: 72 BPM
BH CV STRESS RECOVERY O2: 96 %
BH CV STRESS STAGE 1: 1
LV EF NUC BP: 55 %
MAXIMAL PREDICTED HEART RATE: 165 BPM
PERCENT MAX PREDICTED HR: 52.12 %
STRESS BASELINE BP: NORMAL MMHG
STRESS BASELINE HR: 67 BPM
STRESS O2 SAT REST: 97 %
STRESS PERCENT HR: 61 %
STRESS POST ESTIMATED WORKLOAD: 1 METS
STRESS POST EXERCISE DUR SEC: 30 SEC
STRESS POST O2 SAT PEAK: 98 %
STRESS POST PEAK BP: NORMAL MMHG
STRESS POST PEAK HR: 86 BPM
STRESS TARGET HR: 140 BPM

## 2023-10-09 PROCEDURE — 0 TECHNETIUM SESTAMIBI: Performed by: INTERNAL MEDICINE

## 2023-10-09 PROCEDURE — 93018 CV STRESS TEST I&R ONLY: CPT | Performed by: INTERNAL MEDICINE

## 2023-10-09 PROCEDURE — 78452 HT MUSCLE IMAGE SPECT MULT: CPT | Performed by: INTERNAL MEDICINE

## 2023-10-09 PROCEDURE — A9500 TC99M SESTAMIBI: HCPCS | Performed by: INTERNAL MEDICINE

## 2023-10-09 PROCEDURE — 93016 CV STRESS TEST SUPVJ ONLY: CPT | Performed by: INTERNAL MEDICINE

## 2023-10-09 PROCEDURE — 93017 CV STRESS TEST TRACING ONLY: CPT

## 2023-10-09 PROCEDURE — 78452 HT MUSCLE IMAGE SPECT MULT: CPT

## 2023-10-09 PROCEDURE — 25010000002 REGADENOSON 0.4 MG/5ML SOLUTION: Performed by: INTERNAL MEDICINE

## 2023-10-09 RX ORDER — REGADENOSON 0.08 MG/ML
0.4 INJECTION, SOLUTION INTRAVENOUS
Status: COMPLETED | OUTPATIENT
Start: 2023-10-09 | End: 2023-10-09

## 2023-10-09 RX ADMIN — REGADENOSON 0.4 MG: 0.08 INJECTION, SOLUTION INTRAVENOUS at 09:41

## 2023-10-09 RX ADMIN — TECHNETIUM TC 99M SESTAMIBI 1 DOSE: 1 INJECTION INTRAVENOUS at 08:08

## 2023-10-09 RX ADMIN — TECHNETIUM TC 99M SESTAMIBI 1 DOSE: 1 INJECTION INTRAVENOUS at 09:41

## 2023-10-10 ENCOUNTER — TELEPHONE (OUTPATIENT)
Dept: CARDIOLOGY | Facility: CLINIC | Age: 58
End: 2023-10-10
Payer: MEDICAID

## 2023-10-10 NOTE — TELEPHONE ENCOUNTER
Please let him know that stress test was normal.  There is no evidence of tight blockage in the heart at this time.  Would continue current medications and call with questions or concerns

## 2023-10-10 NOTE — TELEPHONE ENCOUNTER
He will have to discuss this with disability provider. Could also discuss with PCP as they usually deal with more disability issues than we do.

## 2023-10-10 NOTE — TELEPHONE ENCOUNTER
Reviewed recommendations with Damián Diaz and the patient verbalized understanding of the recommendations.    Thank you,  Shanna FROST RN  Triage Nurse [facility]   10:44 EDT

## 2023-10-10 NOTE — TELEPHONE ENCOUNTER
Reviewed results and recommendations with Damián Diaz.  Patient verbalized understanding of results and recommendations.    Scheduled patient for 3 month f/u per last office note.    ,  Patient is asking if he still has lifting restrictions (30 lbs, minimize stress) due to his aneurysm still?  Explained to patient this will likely be an ongoing restriction but will send message to provider for confirmation.    Thank you,  Shanna FROST RN  Triage Nurse [facility]   09:34 EDT

## 2023-10-10 NOTE — TELEPHONE ENCOUNTER
Reviewed 's message with Damián Diaz and he verbalized understanding.  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  Patient stated with these lifting restrictions due to his aneurysm, he will be unable to return to work in construction and is asking if this will qualify him for disability?  Explained that patient may need to contact disability office but would send question to provider as requested.    Please let me know how you would like to proceed.    Thank you,  Shanna FROST RN  Triage Nurse [facility]   10:29 EDT

## 2023-10-16 ENCOUNTER — HOSPITAL ENCOUNTER (OUTPATIENT)
Dept: SLEEP MEDICINE | Facility: HOSPITAL | Age: 58
Discharge: HOME | End: 2023-10-16
Admitting: INTERNAL MEDICINE
Payer: MEDICAID

## 2023-10-16 DIAGNOSIS — R40.0 DAYTIME SOMNOLENCE: ICD-10-CM

## 2023-10-16 DIAGNOSIS — Z82.0 FAMILY HISTORY OF SLEEP APNEA: ICD-10-CM

## 2023-10-16 DIAGNOSIS — R06.83 SNORING: ICD-10-CM

## 2023-10-16 PROCEDURE — 95806 SLEEP STUDY UNATT&RESP EFFT: CPT

## 2023-10-30 ENCOUNTER — TELEPHONE (OUTPATIENT)
Dept: CARDIOLOGY | Facility: CLINIC | Age: 58
End: 2023-10-30
Payer: MEDICAID

## 2023-10-30 NOTE — TELEPHONE ENCOUNTER
Please let him know that home sleep study did show severe sleep apnea.  Sleep medicine will be reaching out to him to make appointment to discuss treatment options and next steps.  If he does not hear from anyone within 1 week please let us know so that we may facilitate appointment.

## 2023-10-30 NOTE — TELEPHONE ENCOUNTER
Notified patient of results, patient verbalizes understanding.  He has already heard from sleep medicine team.  He had no questions or needs from cardiology at this time.    Elaina Gonzalez RN  [facility] Triage  10/30/23 09:02 EDT

## 2023-11-01 RX ORDER — ONDANSETRON 4 MG/1
4 TABLET, FILM COATED ORAL EVERY 6 HOURS PRN
Qty: 30 TABLET | Refills: 0 | OUTPATIENT
Start: 2023-11-01

## 2023-11-07 ENCOUNTER — TELEPHONE (OUTPATIENT)
Dept: SLEEP MEDICINE | Facility: HOSPITAL | Age: 58
End: 2023-11-07
Payer: MEDICAID

## 2023-11-07 ENCOUNTER — OFFICE VISIT (OUTPATIENT)
Dept: SLEEP MEDICINE | Facility: HOSPITAL | Age: 58
End: 2023-11-07
Payer: MEDICAID

## 2023-11-07 VITALS — HEART RATE: 90 BPM | WEIGHT: 315 LBS | BODY MASS INDEX: 40.43 KG/M2 | OXYGEN SATURATION: 95 % | HEIGHT: 74 IN

## 2023-11-07 DIAGNOSIS — G47.33 OSA (OBSTRUCTIVE SLEEP APNEA): Primary | ICD-10-CM

## 2023-11-07 DIAGNOSIS — E66.01 CLASS 3 SEVERE OBESITY DUE TO EXCESS CALORIES WITH SERIOUS COMORBIDITY AND BODY MASS INDEX (BMI) OF 40.0 TO 44.9 IN ADULT: ICD-10-CM

## 2023-11-07 DIAGNOSIS — G47.36 HYPOXEMIA ASSOCIATED WITH SLEEP: ICD-10-CM

## 2023-11-07 PROBLEM — E66.813 CLASS 3 SEVERE OBESITY DUE TO EXCESS CALORIES WITH SERIOUS COMORBIDITY AND BODY MASS INDEX (BMI) OF 40.0 TO 44.9 IN ADULT: Status: ACTIVE | Noted: 2023-11-07

## 2023-11-07 PROCEDURE — G0463 HOSPITAL OUTPT CLINIC VISIT: HCPCS

## 2023-11-07 NOTE — PROGRESS NOTES
"[facility]ultation       Reason for Consultation: JUAN C    Patient Care Team:  Moreno Dallas MD as PCP - General (Family Medicine)  Delores Norman APRN as Nurse Practitioner (Nurse Practitioner)  Jesus Ansari II, MD as Consulting Physician (Hematology and Oncology)  Enrico Santana MD as Consulting Physician (Sleep Medicine)    Chief complaint: JUAN C    History of present illness:    Thank you for asking me to see your patient.  The patient is a 58 y.o. male who has a history of colon cancer, hypertension, iron deficiency anemia, family history of premature coronary artery disease (father  at 41 with pulmonary emboli who on a recent CT scan was noted to have coronary artery calcification.)  He has a history of colon cancer diagnosed 2022 and he underwent resection at the time.  He completed adjuvant therapy with CapeOx (oxaliplatin and Xeloda).  Follow-up CT shows mildly enlarged mesenteric lymph nodes. This is felt to be stable.  He also has pulmonary nodules which are felt to be stable.     Patient had an echocardiogram on 2022 and then on 2023 with evidence of hypertensive heart disease, normal systolic function and a mildly dilated ascending aorta measuring 4.3 cm.  Last  CT scan of the chest (2023) with contrast showed calcified plaque in the mid LAD. RCA and circumflex less well seen.  Mediastinal vascular was \"felt to be normal\".  Right renal calculi noted with mild hydronephrosis on the right     Patient states he is fairly active walking 2 miles in 20 to 30 minutes 2-3 times a week.  He has had no chest pain, shortness of breath, palpitations, syncope near syncope.       Due to complaints of snoring and hypersomnolence and due to the fact that the patient has a family history of JUAN C, cardiology recommended a home sleep study.    Home sleep study performed 10/16/2023.  Severe JUAN C with AHI 40 events per hour noted.  Low oxygen saturation 68% and " sleep-related hypoxia present for 114.6 minutes.  Sleep evaluation requested.    As stated, patient does report snoring.  However, he has not awaken gasping for air.  Presently, he states he really does not have that much complaints of hypersomnolence.  He goes to bed at 10 PM gets out of bed at 7 AM.  He will use the restroom twice during that time.    Review of Systems:    A complete review of systems was done and all were negative with the exception of the above    History:  Past Medical History:   Diagnosis Date    Chronic cough     SINCE COVID DIAGNOSIS 9/2021    Colon cancer 11/03/2022    Ascending colon invasive moderately differentiated adenocarcinoma    Colon polyps 11/03/2022    Transverse colon: fragments of tubular adenoma, rectum: fragments of tubular adenoma and hyperplastic polyp    History of COVID-19 09/2021    Hypertension     Hypoxemia associated with sleep     Iron deficiency anemia     JUAN C (obstructive sleep apnea) 10/16/2023    Home sleep study.  Weight 325 pounds.  Severe JUAN C with AHI 40 events per hour.  Low oxygen saturation 68% and sleep-related hypoxia present for 114.6 minutes   ,   Past Surgical History:   Procedure Laterality Date    CHOLECYSTECTOMY WITH INTRAOPERATIVE CHOLANGIOGRAM N/A 11/11/2022    Procedure: LAPAROSCOPIC CHOLECYSTECTOMY;  Surgeon: Nigel Tolentino MD;  Location: [facility] OR;  Service: General;  Laterality: N/A;    COLON RESECTION Right 11/11/2022    Procedure: COLON RESECTION RIGHT;  Surgeon: Nigel Tolentino MD;  Location: [facility] OR;  Service: General;  Laterality: Right;    COLONOSCOPY N/A 11/03/2022    Procedure: COLONOSCOPY into cecum with tattoo ink injection, bx's and cold bx/snare polypectomies;  Surgeon: Anup Oconnell MD;  Location: [facility] ENDOSCOPY;  Service: Gastroenterology;  Laterality: N/A;  pre: iron def anemia, heme positive stool  post: polyps, colon mass    ENDOSCOPY N/A 11/03/2022    Procedure: ESOPHAGOGASTRODUODENOSCOPYr with bx;   "Surgeon: Anup Oconnell MD;  Location: [facility] ENDOSCOPY;  Service: Gastroenterology;  Laterality: N/A;  pre:  iron def anemia, heme positive stool  post: gastritis     INGUINAL HERNIA REPAIR Bilateral 1970    LACERATION REPAIR Right     THUMB/ HAND   ,   Family History   Problem Relation Age of Onset    Heart failure Mother     Clotting disorder Father         DVT    Lung cancer Sister     Hypertension Brother     Colon cancer Neg Hx     Crohn's disease Neg Hx     Colon polyps Neg Hx     Irritable bowel syndrome Neg Hx     Ulcerative colitis Neg Hx     Malig Hyperthermia Neg Hx    , and   Social History     Socioeconomic History    Marital status: Single   Tobacco Use    Smoking status: Never     Passive exposure: Never    Smokeless tobacco: Never    Tobacco comments:     Caffeine: coffee    Vaping Use    Vaping Use: Never used   Substance and Sexual Activity    Alcohol use: Yes     Comment: rarely    Drug use: Never    Sexual activity: Defer     E-cigarette/Vaping    E-cigarette/Vaping Use Never User     Passive Exposure No     Counseling Given No      E-cigarette/Vaping Substances    Nicotine No     THC No     CBD No     Flavoring No      E-cigarette/Vaping Devices    Disposable No     Pre-filled or Refillable Cartridge No     Refillable Tank No     Pre-filled Pod No       Social History: 3 cups of coffee a day    Allergies:  Patient has no known allergies.     Medication Review: Reviewed    Vital Signs:    Vitals:    11/07/23 0811   Pulse: 90   SpO2: 95%   Weight: (!) 148 kg (326 lb 9.6 oz)   Height: 188 cm (74\")      Body mass index is 41.93 kg/m².  Neck Circumference: 21 inches      Physical Exam:    Constitutional:  Well developed 58 y.o. male that appears in no apparent distress.  Awake & oriented times 3.  Normal mood with normal recent and remote memory and normal judgement.  Eyes:  Conjunctivae normal.  Oropharynx: Moist mucous membranes without exudate and a large tongue and uvula and moderate " narrowing of the posterior pharyngeal opening class III Mallampati airway.    Neck: Trachea midline  Respiratory: Effort is not labored  Cardiovascular: Radial pulse regular  Musculoskeletal: Gait appears normal, no digital clubbing evident, no pre-tibial edema    Results Review: I reviewed the results of the home sleep study from 10/16/2023    Impression:   Severe obstructive sleep apnea with sleep-related hypoxia by home sleep study 10/16/2023, weight 235 pounds.  The patient denies significant complaints of hypersomnolence and his Meally Sleepiness Scale is normal at 5.    Plan:  Good sleep hygiene measures should be maintained.  Weight loss would be beneficial in this patient who has class III severe obesity by Body mass index is 41.93 kg/m².    Pathophysiology of JUAN C described to the patient.  Cardiovascular complications of untreated JUAN C also reviewed.  I also described how some chemotherapy can affect the heart and how sleep apnea can also affect the heart.    After reviewing all with the patient, taking into account his past medical history and present symptoms and signs and findings of this home sleep study, I would recommend auto titrating CPAP between 8 and 20 cm of water pressure.  An appropriate nasal interface should be selected.  After several weeks of auto CPAP usage, overnight oximetry on room air on auto CPAP should be obtained.  Once set up occurs, 6 weeks later, I will see the patient in follow-up to review downloads to assure compliance and efficacy.  I answered all the patient's questions.    Thank you for requesting me to assist in this patient's care.    Enrico Santana MD  Sleep Medicine  11/07/23  13:13 EST

## 2023-11-16 ENCOUNTER — TELEPHONE (OUTPATIENT)
Dept: ONCOLOGY | Facility: HOSPITAL | Age: 58
End: 2023-11-16
Payer: MEDICAID

## 2023-11-16 ENCOUNTER — TELEPHONE (OUTPATIENT)
Dept: ONCOLOGY | Facility: CLINIC | Age: 58
End: 2023-11-16

## 2023-11-16 DIAGNOSIS — C18.2 CANCER OF ASCENDING COLON: Primary | ICD-10-CM

## 2023-11-16 NOTE — TELEPHONE ENCOUNTER
Caller: Damián Diaz    Relationship to patient: Self    Best call back number: [phone]    Patient is needing: TO CHECK TO SEE IF HE HAS AN ORDER FOR HIS COLONSCOPY. PT REQUESTS TEXT WITH DR. LIZ'S AND DR. CUELLAR'S PHONE NUMBERS.

## 2023-11-16 NOTE — TELEPHONE ENCOUNTER
Phoned Damián and let him know I placed a referral to Dr. Oconnell's office and he can call them to schedule his colonoscopy. Sent him Dr. Oconnell's office number to his email per his request.

## 2023-11-27 ENCOUNTER — HOSPITAL ENCOUNTER (OUTPATIENT)
Dept: CT IMAGING | Facility: HOSPITAL | Age: 58
Discharge: HOME | End: 2023-11-27
Admitting: INTERNAL MEDICINE
Payer: MEDICAID

## 2023-11-27 DIAGNOSIS — C18.2 CANCER OF ASCENDING COLON: ICD-10-CM

## 2023-11-27 PROCEDURE — 71260 CT THORAX DX C+: CPT

## 2023-11-27 PROCEDURE — 74177 CT ABD & PELVIS W/CONTRAST: CPT

## 2023-11-27 PROCEDURE — 0 DIATRIZOATE MEGLUMINE & SODIUM PER 1 ML: Performed by: INTERNAL MEDICINE

## 2023-11-27 PROCEDURE — 25510000001 IOPAMIDOL PER 1 ML: Performed by: INTERNAL MEDICINE

## 2023-11-27 RX ADMIN — DIATRIZOATE MEGLUMINE AND DIATRIZOATE SODIUM 30 ML: 660; 100 LIQUID ORAL; RECTAL at 10:45

## 2023-11-27 RX ADMIN — IOPAMIDOL 100 ML: 755 INJECTION, SOLUTION INTRAVENOUS at 12:49

## 2023-11-30 NOTE — PROGRESS NOTES
.     REASON FOR FOLLOWUP :   Resected colon cancer, iron deficiency anemia    HISTORY OF PRESENT ILLNESS:  The patient is a 58 y.o. year old male  who is here for follow-up with the above-mentioned history.    No new problems.  Feeling fine.  Bowel movements regular.  No significant bleeding or nausea or complaints of SOA.  No problems eating or weight loss.    Past Medical History:   Diagnosis Date    Chronic cough     SINCE COVID DIAGNOSIS 9/2021    Colon cancer 11/03/2022    Ascending colon invasive moderately differentiated adenocarcinoma    Colon polyps 11/03/2022    Transverse colon: fragments of tubular adenoma, rectum: fragments of tubular adenoma and hyperplastic polyp    History of COVID-19 09/2021    Hypertension     Hypoxemia associated with sleep     Iron deficiency anemia     JUAN C (obstructive sleep apnea) 10/16/2023    Home sleep study.  Weight 325 pounds.  Severe JUAN C with AHI 40 events per hour.  Low oxygen saturation 68% and sleep-related hypoxia present for 114.6 minutes     Past Surgical History:   Procedure Laterality Date    CHOLECYSTECTOMY WITH INTRAOPERATIVE CHOLANGIOGRAM N/A 11/11/2022    Procedure: LAPAROSCOPIC CHOLECYSTECTOMY;  Surgeon: Nigel Tolentino MD;  Location: [facility] OR;  Service: General;  Laterality: N/A;    COLON RESECTION Right 11/11/2022    Procedure: COLON RESECTION RIGHT;  Surgeon: Nigel Tolentino MD;  Location: [facility] MAIN OR;  Service: General;  Laterality: Right;    COLONOSCOPY N/A 11/03/2022    Procedure: COLONOSCOPY into cecum with tattoo ink injection, bx's and cold bx/snare polypectomies;  Surgeon: Anup Oconnell MD;  Location: [facility] ENDOSCOPY;  Service: Gastroenterology;  Laterality: N/A;  pre: iron def anemia, heme positive stool  post: polyps, colon mass    ENDOSCOPY N/A 11/03/2022    Procedure: ESOPHAGOGASTRODUODENOSCOPYr with bx;  Surgeon: Anup Oconnell MD;  Location: [facility] ENDOSCOPY;  Service: Gastroenterology;  Laterality: N/A;  pre:  iron  def anemia, heme positive stool  post: gastritis     INGUINAL HERNIA REPAIR Bilateral 1970    LACERATION REPAIR Right     THUMB/ HAND       MEDICATIONS    Current Outpatient Medications:     amLODIPine (NORVASC) 10 MG tablet, , Disp: , Rfl:     aspirin 81 MG EC tablet, Take 1 tablet by mouth Daily., Disp: 30 tablet, Rfl: 6    losartan (Cozaar) 25 MG tablet, Take 1 tablet by mouth Daily., Disp: 30 tablet, Rfl: 6    meloxicam (MOBIC) 7.5 MG tablet, Take 1 tablet by mouth., Disp: , Rfl:     pantoprazole (PROTONIX) 40 MG EC tablet, Take 1 tablet by mouth Daily., Disp: , Rfl:     Bismuth 262 MG chewable tablet, Chew 2 tablets 4 (Four) Times a Day. (Patient not taking: Reported on 9/27/2023), Disp: 112 tablet, Rfl: 0    capecitabine (XELODA) 150 MG chemo tablet, Take 1 tablet by mouth 2 (Two) Times a Day with 1 other capecitabine prescription for 2,150 mg total. Take for 14 days on, then off for 7 days. (Patient not taking: Reported on 9/27/2023), Disp: 28 tablet, Rfl: 0    capecitabine (XELODA) 500 MG chemo tablet, Take 4 tablets by mouth 2 (Two) Times a Day with 1 other capecitabine prescription for 2,150 mg total. Take for 14 days on, then off for 7 days. (Patient not taking: Reported on 9/27/2023), Disp: 112 tablet, Rfl: 0    Chlorcyclizine-Pseudoephed (Stahist AD) 25-60 MG tablet, Take 1 tablet by mouth As Needed. (Patient not taking: Reported on 9/27/2023), Disp: , Rfl:     ferrous sulfate 325 (65 FE) MG tablet, Take 1 tablet by mouth Daily With Breakfast. (Patient not taking: Reported on 9/27/2023), Disp: , Rfl:     fluticasone (FLONASE) 50 MCG/ACT nasal spray, 2 sprays into the nostril(s) as directed by provider As Needed. (Patient not taking: Reported on 9/8/2023), Disp: , Rfl:     ketorolac (TORADOL) 10 MG tablet, Take 1 tablet by mouth Every 6 (Six) Hours As Needed for Moderate Pain for up to 20 doses. (Patient not taking: Reported on 9/27/2023), Disp: 20 tablet, Rfl: 0    ondansetron (ZOFRAN) 8 MG tablet, Take  1 tablet by mouth Every 8 (Eight) Hours As Needed for Nausea or Vomiting. (Patient not taking: Reported on 9/8/2023), Disp: 10 tablet, Rfl: 0    tamsulosin (FLOMAX) 0.4 MG capsule 24 hr capsule, Take 1 capsule by mouth Daily. (Patient not taking: Reported on 9/27/2023), Disp: 30 capsule, Rfl: 0    tetracycline (ACHROMYCIN,SUMYCIN) 500 MG capsule, Take 1 capsule by mouth 4 (Four) Times a Day. (Patient not taking: Reported on 9/27/2023), Disp: 56 capsule, Rfl: 0    ALLERGIES:   No Known Allergies    SOCIAL HISTORY:       Social History     Socioeconomic History    Marital status: Single   Tobacco Use    Smoking status: Never     Passive exposure: Never    Smokeless tobacco: Never    Tobacco comments:     Caffeine: coffee    Vaping Use    Vaping Use: Never used   Substance and Sexual Activity    Alcohol use: Yes     Comment: rarely    Drug use: Never    Sexual activity: Defer         FAMILY HISTORY:  Family History   Problem Relation Age of Onset    Heart failure Mother     Clotting disorder Father         DVT    Lung cancer Sister     Hypertension Brother     Colon cancer Neg Hx     Crohn's disease Neg Hx     Colon polyps Neg Hx     Irritable bowel syndrome Neg Hx     Ulcerative colitis Neg Hx     Malig Hyperthermia Neg Hx        REVIEW OF SYSTEMS:  Review of Systems   Constitutional:  Negative for activity change.   HENT:  Negative for nosebleeds and trouble swallowing.    Respiratory:  Negative for shortness of breath and wheezing.    Cardiovascular:  Negative for chest pain and palpitations.   Gastrointestinal:  Negative for diarrhea and nausea.   Genitourinary:  Negative for dysuria and hematuria.   Musculoskeletal:  Negative for arthralgias and myalgias.   Neurological:  Negative for seizures and syncope.   Hematological:  Negative for adenopathy. Does not bruise/bleed easily.   Psychiatric/Behavioral:  Negative for confusion.               Vitals:    12/01/23 1058   BP: 144/88   Pulse: 78   Resp: 16   Temp: 98.7  "°F (37.1 °C)   TempSrc: Temporal   SpO2: 95%   Weight: (!) 150 kg (330 lb 6.4 oz)   Height: 188 cm (74.02\")   PainSc: 0-No pain           12/1/2023    10:59 AM   Current Status   ECOG score 0      PHYSICAL EXAM:        CONSTITUTIONAL:  Vital signs reviewed.  No distress, looks comfortable.  EYES:  Conjunctiva and lids unremarkable.  PERRLA  EARS,NOSE,MOUTH,THROAT:  Ears and nose appear unremarkable.  Lips, teeth, gums appear unremarkable.  RESPIRATORY:  Normal respiratory effort.  Lungs clear to auscultation bilaterally.  CARDIOVASCULAR:  Normal S1, S2.  No murmurs rubs or gallops.  No significant lower extremity edema.  GASTROINTESTINAL: Abdomen appears unremarkable.  Nontender.  No hepatomegaly.  No splenomegaly.  LYMPHATIC:  No cervical, supraclavicular, axillary lymphadenopathy.  SKIN:  Warm.  No rashes.  PSYCHIATRIC:  Normal judgment and insight.  Normal mood and affect.        RECENT LABS:        WBC   Date Value Ref Range Status   09/08/2023 7.78 3.40 - 10.80 10*3/mm3 Final   09/02/2023 11.58 (H) 3.40 - 10.80 10*3/mm3 Final   09/01/2023 7.72 3.40 - 10.80 10*3/mm3 Final   05/16/2023 8.24 3.40 - 10.80 10*3/mm3 Final   03/28/2023 5.94 3.40 - 10.80 10*3/mm3 Final   02/28/2023 4.78 3.40 - 10.80 10*3/mm3 Final   02/07/2023 4.77 3.40 - 10.80 10*3/mm3 Final   01/17/2023 5.44 3.40 - 10.80 10*3/mm3 Final   01/10/2023 5.67 3.40 - 10.80 10*3/mm3 Final   12/27/2022 8.46 3.40 - 10.80 10*3/mm3 Final   12/05/2022 6.39 3.40 - 10.80 10*3/mm3 Final   11/29/2022 4.09 3.40 - 10.80 10*3/mm3 Final   11/12/2022 13.53 (H) 3.40 - 10.80 10*3/mm3 Final   11/07/2022 7.22 3.40 - 10.80 10*3/mm3 Final   10/20/2022 6.24 3.40 - 10.80 10*3/mm3 Final     Hemoglobin   Date Value Ref Range Status   09/08/2023 14.6 13.0 - 17.7 g/dL Final   09/02/2023 14.6 13.0 - 17.7 g/dL Final   09/01/2023 14.8 13.0 - 17.7 g/dL Final   05/16/2023 14.2 13.0 - 17.7 g/dL Final   03/28/2023 13.8 13.0 - 17.7 g/dL Final   02/28/2023 12.0 (L) 13.0 - 17.7 g/dL Final "   02/07/2023 12.5 (L) 13.0 - 17.7 g/dL Final   01/17/2023 12.4 (L) 13.0 - 17.7 g/dL Final   01/10/2023 12.2 (L) 13.0 - 17.7 g/dL Final   12/27/2022 11.9 (L) 13.0 - 17.7 g/dL Final   12/05/2022 11.1 (L) 13.0 - 17.7 g/dL Final   11/29/2022 10.1 (L) 13.0 - 17.7 g/dL Final   11/12/2022 8.2 (L) 13.0 - 17.7 g/dL Final   11/07/2022 8.4 (L) 13.0 - 17.7 g/dL Final   10/20/2022 9.0 (L) 13.0 - 17.7 g/dL Final     Platelets   Date Value Ref Range Status   09/08/2023 209 140 - 450 10*3/mm3 Final   09/02/2023 239 140 - 450 10*3/mm3 Final   09/01/2023 221 140 - 450 10*3/mm3 Final   05/16/2023 213 140 - 450 10*3/mm3 Final   03/28/2023 200 140 - 450 10*3/mm3 Final   02/28/2023 174 140 - 450 10*3/mm3 Final   02/07/2023 169 140 - 450 10*3/mm3 Final   01/17/2023 170 140 - 450 10*3/mm3 Final   01/10/2023 202 140 - 450 10*3/mm3 Final   12/27/2022 285 140 - 450 10*3/mm3 Final   12/05/2022 299 140 - 450 10*3/mm3 Final   11/29/2022 327 140 - 450 10*3/mm3 Final   11/12/2022 321 140 - 450 10*3/mm3 Final   11/07/2022 330 140 - 450 10*3/mm3 Final   10/20/2022 327 140 - 450 10*3/mm3 Final       Assessment & Plan   There are no diagnoses linked to this encounter.        Damián Diaz   *Ascending colon adenocarcinoma  Discovered on colonoscopy 11/3/2022, Dr. Oconnell, for MINA work-up.  Invasive moderately differentiated adenocarcinoma.  CT AP 11/7/2022: Circumferential malignancy ascending colon with adjacent mesenteric LAD.  4 mm RML nodule stable from 6/8/2022.  Radiologist felt likely benign but recommended continued follow-up.  Granulomatous calcifications both lower lobes.  Resection 11/11/2022, Dr. Tolentino: Moderately differentiated adenocarcinoma of cecum, 5.2 cm.  Grade 2.  Invades through muscularis propria into pericolic fat.  Margins negative.  No perineural invasion or lymphovascular invasion.  One of the 30 nodes positive.  [id number], stage 3  No deficiency of MMR proteins.  (pMMR) (consistent with STEFANIA)  Per NCCN guidelines: Low risk  stage III colon cancer preferred regimens are Capeox x3 months versus FOLFOX x3 to 6 months.  Reviewed these options with the patient.  He has chosen Capeox x3 months  Adjuvant CAPEOX x4 cycles 12/27/22-2/28/2023  Oxaliplatin D1.  Xeloda 850 mg/m2 per dose (2150 mg rounding for tablet strength), twice per day, D1-14/21 days.  4 cycles total, which is 3 months of therapy.  CT 5/16/2023: 2 right mesenteric nodes near the anastomotic sites, anterior to lower pole of right kidney, mildly enlarged.  Radiologist stated these could be recurrence or reactive and recommended either follow-up CT 3 months or PET.  Discussed with Dr. Tolentino.  He states these nodes cannot be reached by CT-guided needle biopsy.  I asked Dr. Tolentino if a PET scan is done and it shows activity in these 2 mesenteric nodes if he felt the patient should have an operation (knowing reactive nodes can show activity on PET).  Dr. Tolentino recommends not doing a PET scan and instead doing a follow-up CT in 3 months.  I agree.  Discussed all of this with the patient and he agrees as well.  CT 9/1/2023: Stable pulmonary nodules, no other recurrence.  Specifically the mesenteric nodes are read as stable, 9 and 6 mm in short axis.  CT 11/27/2023: Previously mentioned tiny RML nodule is now thought to represent a calcified granuloma.  Additional benign calcified granulomas elsewhere in both lungs.  Stable 2 small mesenteric nodes near the anastomosis.  Return to 6-month follow-up CT plan.    *Pulmonary nodules  Although only seen in hindsight, first seen as a RML pulmonary nodule on CT 6/8/2022, per CT chest 12/20/2022, when it was read as unchanged from 6/8/2022  CT 5/16/2023: No change in 4 mm RML nodule from 6/8/2022.  CT 9/1/23: Stable pulmonary nodules.  For example TIFFANY 5 mm nodule.  Note previously only and RML pulmonary nodule was read but now in hindsight the radiologist states stable pulmonary nodules and specifically mentions an TIFFANY nodule at 5  mm.  CT 2023: This was read as stable benign calcified granulomas bilateral lungs    *The day after his CT went to the ER with right flank pain radiating to RLQ.  CT repeated on 2023: Right ureteral stone was found.  He was sent home from the ER    *Iron deficiency anemia  2022: Hb 7.3.  Oral iron daily started.  Patient states early 2022 Hb around 8.  10/20/2022 (initial consult with me): Ferritin 9.  3% saturation.  Hb 9.  Patient states he cannot tolerate oral iron anymore due to constipation and abdominal cramping.    Insurance requires Venofer instead of Injectafer  Completed 1000 mg Venofer on 2022.  2022: Ferritin 124, 39% saturation.  Hb pending    *Source of iron deficiency  Colon cancer found and resected on 2022    *Microcytosis, likely due to iron deficiency  MCV pending    *Lightheadedness, dyspnea on exertion, fatigue.  Hopefully this will improve with IV iron.    *Acute kidney injury  Creatinine was up to 1.28 at early 2023 ER visit for kidney stones.  Because of this, creatinine repeated in our office, 2023 and has returned to baseline, 0.86.  Creatinine pending    *Coronary calcifications on CT, and several paternal uncles with CAD (dad  at the young age of 41 from PE)  2023: Referred to cardio oncology    *Class III obesity.  Being overweight can lead to cytopenias through hepatic steatosis.    Body mass index is 42.4 kg/m².  BMI 25 to <30 is overweight  BMI 30 to <35 is class 1 obesity  BMI 35 to <40 is class 2 obesity  BMI 40 or higher is class 3 obesity   Ideal body weight 181 pounds  Remaining overweight.  Ideally, lose weight    *Aortic aneurysm  Following with cardio oncology    Plan  Labs were not done with CT  MD 6 months.  1 week prior:  CT CAP with contrast, CBC, CMP, CEA.   Cardio oncology has requested CT chest be CT chest angiogram, to follow the aneurysm  Plan CTs every 6 months x 5 years)  He asked about his next  colonoscopy.  I told him that is typically 1 year from the last, which was 11/3/2022.  He was told this can be done by Dr. Oconnell or Dr. Tolentino.  We have referred him to Dr. Tolentino for this.

## 2023-12-01 ENCOUNTER — APPOINTMENT (OUTPATIENT)
Dept: OTHER | Facility: HOSPITAL | Age: 58
End: 2023-12-01
Payer: MEDICAID

## 2023-12-01 ENCOUNTER — LAB (OUTPATIENT)
Dept: OTHER | Facility: HOSPITAL | Age: 58
End: 2023-12-01
Payer: MEDICAID

## 2023-12-01 ENCOUNTER — OFFICE VISIT (OUTPATIENT)
Dept: ONCOLOGY | Facility: CLINIC | Age: 58
End: 2023-12-01
Payer: MEDICAID

## 2023-12-01 VITALS
HEIGHT: 74 IN | SYSTOLIC BLOOD PRESSURE: 144 MMHG | WEIGHT: 315 LBS | RESPIRATION RATE: 16 BRPM | OXYGEN SATURATION: 95 % | DIASTOLIC BLOOD PRESSURE: 88 MMHG | TEMPERATURE: 98.7 F | HEART RATE: 78 BPM | BODY MASS INDEX: 40.43 KG/M2

## 2023-12-01 DIAGNOSIS — C18.2 CANCER OF ASCENDING COLON: Primary | ICD-10-CM

## 2023-12-01 DIAGNOSIS — C18.2 CANCER OF ASCENDING COLON: ICD-10-CM

## 2023-12-01 LAB
ALBUMIN SERPL-MCNC: 4.3 G/DL (ref 3.5–5.2)
ALBUMIN/GLOB SERPL: 1.2 G/DL
ALP SERPL-CCNC: 92 U/L (ref 39–117)
ALT SERPL W P-5'-P-CCNC: 22 U/L (ref 1–41)
ANION GAP SERPL CALCULATED.3IONS-SCNC: 10.7 MMOL/L (ref 5–15)
AST SERPL-CCNC: 23 U/L (ref 1–40)
BASOPHILS # BLD AUTO: 0.04 10*3/MM3 (ref 0–0.2)
BASOPHILS NFR BLD AUTO: 0.5 % (ref 0–1.5)
BILIRUB SERPL-MCNC: 0.4 MG/DL (ref 0–1.2)
BUN SERPL-MCNC: 12 MG/DL (ref 6–20)
BUN/CREAT SERPL: 13.6 (ref 7–25)
CALCIUM SPEC-SCNC: 9.7 MG/DL (ref 8.6–10.5)
CEA SERPL-MCNC: 1.42 NG/ML
CHLORIDE SERPL-SCNC: 104 MMOL/L (ref 98–107)
CO2 SERPL-SCNC: 26.3 MMOL/L (ref 22–29)
CREAT SERPL-MCNC: 0.88 MG/DL (ref 0.76–1.27)
DEPRECATED RDW RBC AUTO: 45.1 FL (ref 37–54)
EGFRCR SERPLBLD CKD-EPI 2021: 100.9 ML/MIN/1.73
EOSINOPHIL # BLD AUTO: 0.16 10*3/MM3 (ref 0–0.4)
EOSINOPHIL NFR BLD AUTO: 1.9 % (ref 0.3–6.2)
ERYTHROCYTE [DISTWIDTH] IN BLOOD BY AUTOMATED COUNT: 14.6 % (ref 12.3–15.4)
GLOBULIN UR ELPH-MCNC: 3.7 GM/DL
GLUCOSE SERPL-MCNC: 96 MG/DL (ref 65–99)
HCT VFR BLD AUTO: 47.3 % (ref 37.5–51)
HGB BLD-MCNC: 15.3 G/DL (ref 13–17.7)
IMM GRANULOCYTES # BLD AUTO: 0.04 10*3/MM3 (ref 0–0.05)
IMM GRANULOCYTES NFR BLD AUTO: 0.5 % (ref 0–0.5)
LYMPHOCYTES # BLD AUTO: 3.77 10*3/MM3 (ref 0.7–3.1)
LYMPHOCYTES NFR BLD AUTO: 44.8 % (ref 19.6–45.3)
MCH RBC QN AUTO: 27.5 PG (ref 26.6–33)
MCHC RBC AUTO-ENTMCNC: 32.3 G/DL (ref 31.5–35.7)
MCV RBC AUTO: 85.1 FL (ref 79–97)
MONOCYTES # BLD AUTO: 0.73 10*3/MM3 (ref 0.1–0.9)
MONOCYTES NFR BLD AUTO: 8.7 % (ref 5–12)
NEUTROPHILS NFR BLD AUTO: 3.67 10*3/MM3 (ref 1.7–7)
NEUTROPHILS NFR BLD AUTO: 43.6 % (ref 42.7–76)
NRBC BLD AUTO-RTO: 0 /100 WBC (ref 0–0.2)
PLATELET # BLD AUTO: 227 10*3/MM3 (ref 140–450)
PMV BLD AUTO: 9.1 FL (ref 6–12)
POTASSIUM SERPL-SCNC: 3.9 MMOL/L (ref 3.5–5.2)
PROT SERPL-MCNC: 8 G/DL (ref 6–8.5)
RBC # BLD AUTO: 5.56 10*6/MM3 (ref 4.14–5.8)
SODIUM SERPL-SCNC: 141 MMOL/L (ref 136–145)
WBC NRBC COR # BLD AUTO: 8.41 10*3/MM3 (ref 3.4–10.8)

## 2023-12-01 PROCEDURE — 99214 OFFICE O/P EST MOD 30 MIN: CPT | Performed by: INTERNAL MEDICINE

## 2023-12-01 PROCEDURE — 36415 COLL VENOUS BLD VENIPUNCTURE: CPT

## 2023-12-01 PROCEDURE — 80053 COMPREHEN METABOLIC PANEL: CPT | Performed by: INTERNAL MEDICINE

## 2023-12-01 PROCEDURE — 85025 COMPLETE CBC W/AUTO DIFF WBC: CPT | Performed by: INTERNAL MEDICINE

## 2023-12-01 PROCEDURE — 82378 CARCINOEMBRYONIC ANTIGEN: CPT | Performed by: INTERNAL MEDICINE

## 2023-12-01 PROCEDURE — 1126F AMNT PAIN NOTED NONE PRSNT: CPT | Performed by: INTERNAL MEDICINE

## 2023-12-06 ENCOUNTER — TELEPHONE (OUTPATIENT)
Dept: ONCOLOGY | Facility: HOSPITAL | Age: 58
End: 2023-12-06
Payer: MEDICAID

## 2023-12-06 NOTE — TELEPHONE ENCOUNTER
Phoned Mr. Diaz to inform him [employer] is NOT part of the plans that [facility] providers will out of network with. [employer] commercial and [employer] replacement are out of network with [facility]. [employer] is in-network with both the hospital and [facility]. He should expect a call from Dr. Tolentino's office to schedule his appt. He appreciated the phone call.

## 2023-12-28 NOTE — PROGRESS NOTES
"Date of Office Visit: 2023  Encounter Provider: ROSEMARIE Ariza  Place of Service: [facility]  Patient Name: Damián Diaz  :1965    Chief complaint  Thoracic aortic dilation, coronary artery calcification    History of Present Illness  Patient is a 58 y.o. year old male  patient of Dr. Perea. Past medical history includes colon cancer, hypertension, iron deficiency anemia, family history of premature coronary artery disease (father  at 41 with pulmonary emboli who on a recent CT scan was noted to have coronary artery calcification.  He has a history of colon cancer diagnosed on 2022 he underwent resection at the time.  He completed adjuvant therapy with CapeOx (oxaliplatin and Xeloda).  Follow-up CT shows mildly enlarged mesenteric lymph nodes for which follow-up with pet imaging has been recommended.  This is felt to be stable.  He also has pulmonary nodules which are felt to be stable.     Patient had an echocardiogram on 2022 and then on 2023 with evidence of hypertensive heart disease, normal systolic function and a mildly dilated ascending aorta measuring 4.3 cm.  Last  CT scan of the chest (2023) with contrast showed calcified plaque in the mid LAD to my review of images.  RCA and circumflex less well seen.  Mediastinal vasculature was \"felt to be normal\".  Right renal calculi noted with mild hydronephrosis on the right. Radiology was asked to review CT scan from 2023 and found enlargement of the ascending aorta measuring 4.4 cm in diameter this was felt to be stable since prior CT scan 2022.  Lexiscan Cardiolite stress test was negative for ischemia.    Interval history  Patient presents today for routine follow-up.  I will visit with him for the first time today and have reviewed his medical record.  Since last visit he has been using CPAP consistently.  He denies palpitations, shortness of breath, edema, dizziness, " "chest pain or chest pressure, fatigue, syncope or presyncope.  He is walking 2 miles 2-3 times per week and denies exertional symptoms.  Blood pressure in office today is significantly elevated at 144/104 but he reports this is due to stress from traffic on the way to the office.  He has not been checking BP regularly at home. He has only been taking losartan sporadically as \"it makes him feel weird\". He has not been checking BP at those times.    Past Medical History:   Diagnosis Date    Chronic cough     SINCE COVID DIAGNOSIS 9/2021    Colon cancer 11/03/2022    Ascending colon invasive moderately differentiated adenocarcinoma    Colon polyps 11/03/2022    Transverse colon: fragments of tubular adenoma, rectum: fragments of tubular adenoma and hyperplastic polyp    History of COVID-19 09/2021    Hypertension     Hypoxemia associated with sleep     Iron deficiency anemia     JUAN C (obstructive sleep apnea) 10/16/2023    Home sleep study.  Weight 325 pounds.  Severe JUAN C with AHI 40 events per hour.  Low oxygen saturation 68% and sleep-related hypoxia present for 114.6 minutes     Past Surgical History:   Procedure Laterality Date    CHOLECYSTECTOMY WITH INTRAOPERATIVE CHOLANGIOGRAM N/A 11/11/2022    Procedure: LAPAROSCOPIC CHOLECYSTECTOMY;  Surgeon: Nigel Tolentino MD;  Location: [facility] OR;  Service: General;  Laterality: N/A;    COLON RESECTION Right 11/11/2022    Procedure: COLON RESECTION RIGHT;  Surgeon: Nigel Tolentino MD;  Location: [facility] OR;  Service: General;  Laterality: Right;    COLONOSCOPY N/A 11/03/2022    Procedure: COLONOSCOPY into cecum with tattoo ink injection, bx's and cold bx/snare polypectomies;  Surgeon: Anup Oconnell MD;  Location: [facility] ENDOSCOPY;  Service: Gastroenterology;  Laterality: N/A;  pre: iron def anemia, heme positive stool  post: polyps, colon mass    ENDOSCOPY N/A 11/03/2022    Procedure: ESOPHAGOGASTRODUODENOSCOPYr with bx;  Surgeon: Anup Oconnell MD;  " Location: [facility] ENDOSCOPY;  Service: Gastroenterology;  Laterality: N/A;  pre:  iron def anemia, heme positive stool  post: gastritis     INGUINAL HERNIA REPAIR Bilateral 1970    LACERATION REPAIR Right     THUMB/ HAND     Outpatient Medications Prior to Visit   Medication Sig Dispense Refill    amLODIPine (NORVASC) 10 MG tablet       aspirin 81 MG EC tablet Take 1 tablet by mouth Daily. 30 tablet 6    hydroCHLOROthiazide (HYDRODIURIL) 25 MG tablet       losartan (Cozaar) 25 MG tablet Take 1 tablet by mouth Daily. 30 tablet 6    meloxicam (MOBIC) 7.5 MG tablet Take 1 tablet by mouth.      Bismuth 262 MG chewable tablet Chew 2 tablets 4 (Four) Times a Day. 112 tablet 0    capecitabine (XELODA) 150 MG chemo tablet Take 1 tablet by mouth 2 (Two) Times a Day with 1 other capecitabine prescription for 2,150 mg total. Take for 14 days on, then off for 7 days. 28 tablet 0    capecitabine (XELODA) 500 MG chemo tablet Take 4 tablets by mouth 2 (Two) Times a Day with 1 other capecitabine prescription for 2,150 mg total. Take for 14 days on, then off for 7 days. 112 tablet 0    Chlorcyclizine-Pseudoephed (Stahist AD) 25-60 MG tablet Take 1 tablet by mouth As Needed. (Patient not taking: Reported on 9/27/2023)      ferrous sulfate 325 (65 FE) MG tablet Take 1 tablet by mouth Daily With Breakfast. (Patient not taking: Reported on 9/27/2023)      fluticasone (FLONASE) 50 MCG/ACT nasal spray 2 sprays into the nostril(s) as directed by provider As Needed. (Patient not taking: Reported on 9/8/2023)      ketorolac (TORADOL) 10 MG tablet Take 1 tablet by mouth Every 6 (Six) Hours As Needed for Moderate Pain for up to 20 doses. (Patient not taking: Reported on 9/27/2023) 20 tablet 0    ondansetron (ZOFRAN) 8 MG tablet Take 1 tablet by mouth Every 8 (Eight) Hours As Needed for Nausea or Vomiting. (Patient not taking: Reported on 9/8/2023) 10 tablet 0    pantoprazole (PROTONIX) 40 MG EC tablet Take 1 tablet by mouth Daily. (Patient not  "taking: Reported on 12/29/2023)      tamsulosin (FLOMAX) 0.4 MG capsule 24 hr capsule Take 1 capsule by mouth Daily. (Patient not taking: Reported on 9/27/2023) 30 capsule 0    tetracycline (ACHROMYCIN,SUMYCIN) 500 MG capsule Take 1 capsule by mouth 4 (Four) Times a Day. (Patient not taking: Reported on 9/27/2023) 56 capsule 0     No facility-administered medications prior to visit.       Allergies as of 12/29/2023    (No Known Allergies)     Social History     Socioeconomic History    Marital status: Single   Tobacco Use    Smoking status: Never     Passive exposure: Never    Smokeless tobacco: Never    Tobacco comments:     Caffeine: coffee    Vaping Use    Vaping Use: Never used   Substance and Sexual Activity    Alcohol use: Yes     Comment: rarely    Drug use: Never    Sexual activity: Defer     Family History   Problem Relation Age of Onset    Heart failure Mother     Clotting disorder Father         DVT    Lung cancer Sister     Hypertension Brother     Colon cancer Neg Hx     Crohn's disease Neg Hx     Colon polyps Neg Hx     Irritable bowel syndrome Neg Hx     Ulcerative colitis Neg Hx     Malig Hyperthermia Neg Hx      Review of Systems   Constitutional: Negative for malaise/fatigue.   Cardiovascular:  Negative for chest pain, claudication, dyspnea on exertion, leg swelling, near-syncope, orthopnea, palpitations, paroxysmal nocturnal dyspnea and syncope.   Respiratory:  Negative for shortness of breath.    Neurological:  Negative for brief paralysis, dizziness, headaches and light-headedness.   All other systems reviewed and are negative.       Objective:     Vitals:    12/29/23 1006   BP: (!) 144/104   BP Location: Right arm   Patient Position: Sitting   Cuff Size: Large Adult   Pulse: 61   Weight: (!) 147 kg (325 lb)   Height: 188 cm (74.02\")     Body mass index is 41.7 kg/m².    Vitals reviewed.   Constitutional:       General: Not in acute distress.     Appearance: Well-developed and not in distress. " "Not diaphoretic.   HENT:      Head: Normocephalic.   Pulmonary:      Effort: Pulmonary effort is normal. No respiratory distress.      Breath sounds: Normal breath sounds. No wheezing. No rhonchi. No rales.   Cardiovascular:      Normal rate. Regular rhythm.      Murmurs: There is no murmur.   Pulses:     Radial: 2+ bilaterally.  Edema:     Peripheral edema absent.   Skin:     General: Skin is warm and dry. There is no cyanosis.      Findings: No rash.   Neurological:      Mental Status: Alert and oriented to person, place, and time.   Psychiatric:         Behavior: Behavior normal. Behavior is cooperative.         Thought Content: Thought content normal.         Judgment: Judgment normal.       Lab Review:     Lab Results   Component Value Date     12/01/2023     09/08/2023    K 3.9 12/01/2023    K 4.3 09/08/2023     12/01/2023     09/08/2023    CO2 26.3 12/01/2023    CO2 26.3 09/08/2023    BUN 12 12/01/2023    BUN 10 09/08/2023    CREATININE 0.88 12/01/2023    CREATININE 0.86 09/08/2023    GLUCOSE 96 12/01/2023    GLUCOSE 118 (H) 09/08/2023    CALCIUM 9.7 12/01/2023    CALCIUM 9.5 09/08/2023    ALBUMIN 4.3 12/01/2023    ALBUMIN 4.5 09/08/2023    BILITOT 0.4 12/01/2023    BILITOT 0.3 09/08/2023    AST 23 12/01/2023    AST 21 09/08/2023    ALT 22 12/01/2023    ALT 21 09/08/2023     Lab Results   Component Value Date    WBC 8.41 12/01/2023    WBC 7.78 09/08/2023    HGB 15.3 12/01/2023    HGB 14.6 09/08/2023    HCT 47.3 12/01/2023    HCT 45.5 09/08/2023    MCV 85.1 12/01/2023    MCV 84.7 09/08/2023     12/01/2023     09/08/2023     No results found for: \"PROBNP\", \"BNP\"  No results found for: \"CKTOTAL\", \"CKMB\", \"CKMBINDEX\", \"TROPONINI\", \"TROPONINT\"  No results found for: \"TSH\"          ECG 12 Lead    Date/Time: 12/29/2023 10:26 AM  Performed by: Stephanie Glass APRN    Authorized by: Stephanie Glass APRN  Comparison: compared with previous ECG   Similar to previous " ECG  Rhythm: sinus rhythm  Rate: normal  BPM: 61  Conduction: non-specific intraventricular conduction delay  QRS axis: normal  Comments: Similar to prior.  No new ischemic changes        Assessment:       Diagnosis Plan   1. Coronary artery disease involving coronary bypass graft of native heart without angina pectoris  ECG 12 Lead      2. Aneurysm of ascending aorta without rupture  ECG 12 Lead      3. Cancer of ascending colon  ECG 12 Lead      4. Dyspnea, unspecified type  ECG 12 Lead        Plan:       1.  Coronary artery disease with coronary artery calcification on recent CT imaging.  This is predominantly visualized in the LAD the circumflex and right coronary artery, partially visualized.  Stress test negative for ischemia at a good workload.   2.  Aortic valve calcification with family history of bicuspid aortic valve.  Patient's valve leaflet number cannot be determined.  There is also concern of ascending aortic aneurysm.  We will plan on annual physical exams and repeat echo in 2 -3 years.   3.  Probable ascending aortic aneurysm.  Noted by's echo measuring 4.3 cm with good image planes.  However not commented on various CTs performed in the past year. CT scan 11/2023 showed stable aorta.  4.  Hypertension with hypertensive heart disease.  Reinforced goal BP <130/80. Asked him to start losartan 12.5 mg BID and to start checking BP at home 1-2 hours after medication. He will call in 1-2 weeks with an update on BP.  3.  Hyperlipidemia. On statin per PCP  4.  Colon cancer status post resection with chemotherapy.  5.  Elevated glucose.  Check hemoglobin A1c  7.  Probable sleep apnea.  Now on CPAP and following with sleep medicine      Time Spent: I spent 30 minutes caring for Damián on this date of service. This time includes time spent by me in the following activities: preparing for the visit, reviewing tests, performing a medically appropriate examination and/or evaluations, counseling and educating the  patient/family/caregiver, ordering medications, tests, or procedures, documenting information in the medical record, and independently interpreting results and communicating that information with the patient/family/caregiver.   I spent 1 minutes on the separately reported service of ECG. This time is not included in the time used to support the E/M service also reported today.        Your medication list            Accurate as of December 29, 2023 10:46 AM. If you have any questions, ask your nurse or doctor.                CONTINUE taking these medications        Instructions Last Dose Given Next Dose Due   amLODIPine 10 MG tablet  Commonly known as: NORVASC           aspirin 81 MG EC tablet      Take 1 tablet by mouth Daily.       hydroCHLOROthiazide 25 MG tablet  Commonly known as: HYDRODIURIL           losartan 25 MG tablet  Commonly known as: Cozaar      Take 1 tablet by mouth Daily.       meloxicam 7.5 MG tablet  Commonly known as: MOBIC      Take 1 tablet by mouth.                Patient is no longer taking -.  I corrected the med list to reflect this.  I did not stop these medications.    Return in about 3 months (around 3/29/2024) for with Dr. Perea.      Dictated utilizing Dragon dictation

## 2023-12-29 ENCOUNTER — OFFICE VISIT (OUTPATIENT)
Dept: CARDIOLOGY | Facility: CLINIC | Age: 58
End: 2023-12-29
Payer: MEDICAID

## 2023-12-29 VITALS
WEIGHT: 315 LBS | BODY MASS INDEX: 40.43 KG/M2 | SYSTOLIC BLOOD PRESSURE: 144 MMHG | HEART RATE: 61 BPM | DIASTOLIC BLOOD PRESSURE: 104 MMHG | HEIGHT: 74 IN

## 2023-12-29 DIAGNOSIS — I71.21 ANEURYSM OF ASCENDING AORTA WITHOUT RUPTURE: ICD-10-CM

## 2023-12-29 DIAGNOSIS — I25.810 CORONARY ARTERY DISEASE INVOLVING CORONARY BYPASS GRAFT OF NATIVE HEART WITHOUT ANGINA PECTORIS: Primary | ICD-10-CM

## 2023-12-29 DIAGNOSIS — C18.2 CANCER OF ASCENDING COLON: ICD-10-CM

## 2023-12-29 DIAGNOSIS — R06.00 DYSPNEA, UNSPECIFIED TYPE: ICD-10-CM

## 2023-12-29 PROCEDURE — 1159F MED LIST DOCD IN RCRD: CPT | Performed by: NURSE PRACTITIONER

## 2023-12-29 PROCEDURE — 93000 ELECTROCARDIOGRAM COMPLETE: CPT | Performed by: NURSE PRACTITIONER

## 2023-12-29 PROCEDURE — 99214 OFFICE O/P EST MOD 30 MIN: CPT | Performed by: NURSE PRACTITIONER

## 2023-12-29 PROCEDURE — 1160F RVW MEDS BY RX/DR IN RCRD: CPT | Performed by: NURSE PRACTITIONER

## 2023-12-29 RX ORDER — HYDROCHLOROTHIAZIDE 25 MG/1
TABLET ORAL
COMMUNITY
Start: 2023-12-28

## 2024-01-18 ENCOUNTER — TELEPHONE (OUTPATIENT)
Dept: SLEEP MEDICINE | Facility: HOSPITAL | Age: 59
End: 2024-01-18
Payer: MEDICAID

## 2024-01-18 NOTE — TELEPHONE ENCOUNTER
Reached out to [facility] to see if they had received CPAP order. They had not , refaxed orders , have them pulling the rest of his info to expedite process

## 2024-02-15 ENCOUNTER — OFFICE VISIT (OUTPATIENT)
Dept: SURGERY | Facility: CLINIC | Age: 59
End: 2024-02-15
Payer: MEDICAID

## 2024-02-15 VITALS
HEIGHT: 74 IN | DIASTOLIC BLOOD PRESSURE: 82 MMHG | WEIGHT: 315 LBS | BODY MASS INDEX: 40.43 KG/M2 | SYSTOLIC BLOOD PRESSURE: 142 MMHG

## 2024-02-15 DIAGNOSIS — C18.2 CANCER OF ASCENDING COLON: Primary | ICD-10-CM

## 2024-02-15 PROCEDURE — 99213 OFFICE O/P EST LOW 20 MIN: CPT | Performed by: SURGERY

## 2024-02-15 PROCEDURE — 1159F MED LIST DOCD IN RCRD: CPT | Performed by: SURGERY

## 2024-02-15 PROCEDURE — 1160F RVW MEDS BY RX/DR IN RCRD: CPT | Performed by: SURGERY

## 2024-02-17 PROBLEM — E66.01 MORBID (SEVERE) OBESITY DUE TO EXCESS CALORIES: Status: ACTIVE | Noted: 2024-02-17

## 2024-02-17 NOTE — H&P (VIEW-ONLY)
ASSESSMENT/PLAN:    58-year-old gentleman status post laparoscopic right colectomy and cholecystectomy for ascending colon cancer and gallstones 11/11/2022.  Pathology T3 N1a tumor.  Presents for surveillance colonoscopy.  He understands the rationale for the procedure, the nature of the procedure and the risks and wishes to proceed as outlined.    CC:     Follow-up colon cancer    INTERVAL HISTORY:    Doing well, reporting no significant problems.    PHYSICAL EXAM:   Constitutional: No acute distress  Vital signs:   Blood pressure 142/82  Weight 330 pounds  Height 74 inches  BMI 42  Abdomen: Soft, nondistended, nontender    Class 3 Severe Obesity (BMI >=40). Obesity-related health conditions include the following: obstructive sleep apnea and hypertension. Obesity is unchanged. BMI is is above average; BMI management plan is completed. We discussed portion control and increasing exercise.       GERMAINE MIGUEL M.D.

## 2024-02-28 ENCOUNTER — ANESTHESIA EVENT (OUTPATIENT)
Dept: PERIOP | Facility: HOSPITAL | Age: 59
End: 2024-02-28
Payer: MEDICAID

## 2024-02-29 ENCOUNTER — ANESTHESIA (OUTPATIENT)
Dept: PERIOP | Facility: HOSPITAL | Age: 59
End: 2024-02-29
Payer: MEDICAID

## 2024-02-29 ENCOUNTER — HOSPITAL ENCOUNTER (OUTPATIENT)
Facility: HOSPITAL | Age: 59
Setting detail: HOSPITAL OUTPATIENT SURGERY
Discharge: HOME | End: 2024-02-29
Attending: SURGERY | Admitting: SURGERY
Payer: MEDICAID

## 2024-02-29 VITALS
WEIGHT: 315 LBS | BODY MASS INDEX: 40.43 KG/M2 | HEIGHT: 74 IN | SYSTOLIC BLOOD PRESSURE: 169 MMHG | RESPIRATION RATE: 28 BRPM | TEMPERATURE: 97.9 F | OXYGEN SATURATION: 96 % | DIASTOLIC BLOOD PRESSURE: 121 MMHG | HEART RATE: 67 BPM

## 2024-02-29 PROCEDURE — 25010000002 PROPOFOL 10 MG/ML EMULSION: Performed by: NURSE ANESTHETIST, CERTIFIED REGISTERED

## 2024-02-29 PROCEDURE — 25810000003 LACTATED RINGERS PER 1000 ML: Performed by: NURSE ANESTHETIST, CERTIFIED REGISTERED

## 2024-02-29 PROCEDURE — 45378 DIAGNOSTIC COLONOSCOPY: CPT | Performed by: SURGERY

## 2024-02-29 RX ORDER — LIDOCAINE HYDROCHLORIDE 20 MG/ML
INJECTION, SOLUTION INFILTRATION; PERINEURAL AS NEEDED
Status: DISCONTINUED | OUTPATIENT
Start: 2024-02-29 | End: 2024-02-29 | Stop reason: SURG

## 2024-02-29 RX ORDER — SODIUM CHLORIDE, SODIUM LACTATE, POTASSIUM CHLORIDE, CALCIUM CHLORIDE 600; 310; 30; 20 MG/100ML; MG/100ML; MG/100ML; MG/100ML
100 INJECTION, SOLUTION INTRAVENOUS ONCE
Status: DISCONTINUED | OUTPATIENT
Start: 2024-02-29 | End: 2024-02-29 | Stop reason: HOSPADM

## 2024-02-29 RX ORDER — SODIUM CHLORIDE, SODIUM LACTATE, POTASSIUM CHLORIDE, CALCIUM CHLORIDE 600; 310; 30; 20 MG/100ML; MG/100ML; MG/100ML; MG/100ML
9 INJECTION, SOLUTION INTRAVENOUS CONTINUOUS PRN
Status: DISCONTINUED | OUTPATIENT
Start: 2024-02-29 | End: 2024-02-29 | Stop reason: HOSPADM

## 2024-02-29 RX ORDER — SODIUM CHLORIDE 0.9 % (FLUSH) 0.9 %
10 SYRINGE (ML) INJECTION AS NEEDED
Status: DISCONTINUED | OUTPATIENT
Start: 2024-02-29 | End: 2024-02-29 | Stop reason: HOSPADM

## 2024-02-29 RX ORDER — ONDANSETRON 2 MG/ML
4 INJECTION INTRAMUSCULAR; INTRAVENOUS ONCE AS NEEDED
Status: DISCONTINUED | OUTPATIENT
Start: 2024-02-29 | End: 2024-02-29 | Stop reason: HOSPADM

## 2024-02-29 RX ORDER — PROPOFOL 10 MG/ML
VIAL (ML) INTRAVENOUS AS NEEDED
Status: DISCONTINUED | OUTPATIENT
Start: 2024-02-29 | End: 2024-02-29 | Stop reason: SURG

## 2024-02-29 RX ORDER — PROPOFOL 10 MG/ML
VIAL (ML) INTRAVENOUS AS NEEDED
Status: DISCONTINUED | OUTPATIENT
Start: 2024-02-29 | End: 2024-02-29

## 2024-02-29 RX ORDER — SODIUM CHLORIDE 9 MG/ML
40 INJECTION, SOLUTION INTRAVENOUS AS NEEDED
Status: DISCONTINUED | OUTPATIENT
Start: 2024-02-29 | End: 2024-02-29 | Stop reason: HOSPADM

## 2024-02-29 RX ORDER — SODIUM CHLORIDE 0.9 % (FLUSH) 0.9 %
10 SYRINGE (ML) INJECTION EVERY 12 HOURS SCHEDULED
Status: DISCONTINUED | OUTPATIENT
Start: 2024-02-29 | End: 2024-02-29 | Stop reason: HOSPADM

## 2024-02-29 RX ADMIN — PROPOFOL 20 MG: 10 INJECTION, EMULSION INTRAVENOUS at 16:01

## 2024-02-29 RX ADMIN — PROPOFOL 20 MG: 10 INJECTION, EMULSION INTRAVENOUS at 16:03

## 2024-02-29 RX ADMIN — LIDOCAINE HYDROCHLORIDE 100 MG: 20 INJECTION, SOLUTION INFILTRATION; PERINEURAL at 15:56

## 2024-02-29 RX ADMIN — PROPOFOL 20 MG: 10 INJECTION, EMULSION INTRAVENOUS at 15:59

## 2024-02-29 RX ADMIN — PROPOFOL 120 MG: 10 INJECTION, EMULSION INTRAVENOUS at 15:56

## 2024-02-29 RX ADMIN — SODIUM CHLORIDE, POTASSIUM CHLORIDE, SODIUM LACTATE AND CALCIUM CHLORIDE 9 ML/HR: 600; 310; 30; 20 INJECTION, SOLUTION INTRAVENOUS at 13:33

## 2024-02-29 NOTE — ANESTHESIA POSTPROCEDURE EVALUATION
Patient: Damián Diaz    Procedure Summary       Date: 02/29/24 Room / Location: [facility] ENDOSCOPY 2 /  [facility]    Anesthesia Start: 1553 Anesthesia Stop: 1611    Procedure: COLONOSCOPY Diagnosis:       Cancer of ascending colon      (Cancer of ascending colon [C18.2])    Surgeons: Nigel Tolentino MD Provider: Henri Rivero CRNA    Anesthesia Type: MAC ASA Status: 3            Anesthesia Type: MAC    Vitals  Vitals Value Taken Time   /114 02/29/24 1630   Temp 97.9 °F (36.6 °C) 02/29/24 1611   Pulse 69 02/29/24 1636   Resp 30 02/29/24 1611   SpO2 95 % 02/29/24 1636   Vitals shown include unfiled device data.        Post Anesthesia Care and Evaluation    Patient location during evaluation: bedside  Patient participation: complete - patient participated  Level of consciousness: awake and alert  Pain score: 0  Pain management: adequate    Airway patency: patent  Anesthetic complications: No anesthetic complications  PONV Status: none  Cardiovascular status: acceptable  Respiratory status: acceptable  Hydration status: acceptable  No anesthesia care post op

## 2024-02-29 NOTE — ANESTHESIA PREPROCEDURE EVALUATION
Anesthesia Evaluation     Patient summary reviewed and Nursing notes reviewed   no history of anesthetic complications:   NPO Solid Status: > 8 hours  NPO Liquid Status: > 4 hours           Airway   Mallampati: II  TM distance: >3 FB  Neck ROM: limited  Large neck circumference  Dental - normal exam     Pulmonary - normal exam    breath sounds clear to auscultation  (+) ,sleep apnea  (-) not a smoker  Cardiovascular - normal exam  Exercise tolerance: good (4-7 METS)    ECG reviewed  PT is on anticoagulation therapy  Rhythm: regular  Rate: normal    (+) hypertension, CAD (oronary artery disease involving coronary bypass graft of native heart without angina pectoris)    ROS comment: ECG 12 Lead     Date/Time: 12/29/2023 10:26 AM  Performed by: Stephanie Glass APRN     Authorized by: Stephanie Glass APRN  Comparison: compared with previous ECG   Similar to previous ECG  Rhythm: sinus rhythm  Rate: normal  BPM: 61  Conduction: non-specific intraventricular conduction delay  QRS axis: normal  Comments: Similar to prior.  No new ischemic changes    05/2023 ECHO:    Interpretation Summary     ·  Left ventricular systolic function is normal. Calculated left ventricular EF = 64.8%  ·  Left ventricular wall thickness is consistent with moderate to severe concentric hypertrophy.  ·  Left ventricular diastolic function is consistent with (grade I) impaired relaxation.  ·  Estimated right ventricular systolic pressure from tricuspid regurgitation is normal (<35 mmHg).  ·  Mild dilation of the ascending aorta is present.      10/2023 Stress Test    Interpretation Summary     ·  Left ventricular ejection fraction is normal (Calculated EF = 55%).  ·  Myocardial perfusion imaging indicates a normal myocardial perfusion study with no evidence of ischemia.  ·  Impressions are consistent with a low risk study.      Neuro/Psych- negative ROS  GI/Hepatic/Renal/Endo    (+) obesity (BMI 42), morbid obesity    Musculoskeletal      (+) arthralgias, back pain, chronic pain  Abdominal   (+) obese    Abdomen: soft.  Bowel sounds: normal.   Substance History - negative use  (-) alcohol use, drug use     OB/GYN          Other   arthritis,   history of cancer (Colon s/p Colon Resection, chemo)                      Anesthesia Plan    ASA 3     MAC     intravenous induction     Anesthetic plan, risks, benefits, and alternatives have been provided, discussed and informed consent has been obtained with: patient.  Pre-procedure education provided  Use of blood products discussed with patient  Consented to blood products.    Plan discussed with CRNA.        CODE STATUS:

## 2024-04-24 ENCOUNTER — OFFICE VISIT (OUTPATIENT)
Dept: CARDIOLOGY | Facility: CLINIC | Age: 59
End: 2024-04-24
Payer: MEDICAID

## 2024-04-24 VITALS
SYSTOLIC BLOOD PRESSURE: 156 MMHG | HEIGHT: 74 IN | BODY MASS INDEX: 40.43 KG/M2 | HEART RATE: 69 BPM | WEIGHT: 315 LBS | OXYGEN SATURATION: 98 % | DIASTOLIC BLOOD PRESSURE: 84 MMHG | RESPIRATION RATE: 20 BRPM

## 2024-04-24 DIAGNOSIS — G47.33 OSA (OBSTRUCTIVE SLEEP APNEA): ICD-10-CM

## 2024-04-24 DIAGNOSIS — C18.2 CANCER OF ASCENDING COLON: ICD-10-CM

## 2024-04-24 DIAGNOSIS — E66.01 CLASS 3 SEVERE OBESITY DUE TO EXCESS CALORIES WITH SERIOUS COMORBIDITY AND BODY MASS INDEX (BMI) OF 40.0 TO 44.9 IN ADULT: ICD-10-CM

## 2024-04-24 DIAGNOSIS — I25.810 CORONARY ARTERY DISEASE INVOLVING CORONARY BYPASS GRAFT OF NATIVE HEART WITHOUT ANGINA PECTORIS: Primary | ICD-10-CM

## 2024-04-24 RX ORDER — NEBIVOLOL 2.5 MG/1
2.5 TABLET ORAL DAILY
Qty: 30 TABLET | Refills: 6 | Status: SHIPPED | OUTPATIENT
Start: 2024-04-24

## 2024-04-25 ENCOUNTER — TELEPHONE (OUTPATIENT)
Dept: CARDIOLOGY | Facility: CLINIC | Age: 59
End: 2024-04-25
Payer: MEDICAID

## 2024-05-24 ENCOUNTER — HOSPITAL ENCOUNTER (OUTPATIENT)
Dept: CT IMAGING | Facility: HOSPITAL | Age: 59
Discharge: HOME | End: 2024-05-24
Payer: MEDICAID

## 2024-05-24 DIAGNOSIS — C18.2 CANCER OF ASCENDING COLON: ICD-10-CM

## 2024-05-24 PROCEDURE — 0 DIATRIZOATE MEGLUMINE & SODIUM PER 1 ML: Performed by: INTERNAL MEDICINE

## 2024-05-24 PROCEDURE — 71275 CT ANGIOGRAPHY CHEST: CPT

## 2024-05-24 PROCEDURE — 25510000001 IOPAMIDOL PER 1 ML: Performed by: INTERNAL MEDICINE

## 2024-05-24 PROCEDURE — 74177 CT ABD & PELVIS W/CONTRAST: CPT

## 2024-05-24 RX ADMIN — DIATRIZOATE MEGLUMINE AND DIATRIZOATE SODIUM 30 ML: 660; 100 LIQUID ORAL; RECTAL at 10:00

## 2024-05-24 RX ADMIN — IOPAMIDOL 100 ML: 755 INJECTION, SOLUTION INTRAVENOUS at 11:30

## 2024-05-30 NOTE — PROGRESS NOTES
.     REASON FOR FOLLOWUP :   Resected colon cancer, iron deficiency anemia    HISTORY OF PRESENT ILLNESS:  The patient is a 58 y.o. year old male  who is here for follow-up with the above-mentioned history.    No new problems.  Feeling fine.  In fact, feeling better than he has for a while.  Playing golf regularly with his friends.  No bleeding.  No problems with bowel movements.  No unusual areas of pain.  No SOA.  No problems eating    Past Medical History:   Diagnosis Date    Chronic cough     SINCE COVID DIAGNOSIS 9/2021    Colon cancer 11/03/2022    Ascending colon invasive moderately differentiated adenocarcinoma    Colon polyps 11/03/2022    Transverse colon: fragments of tubular adenoma, rectum: fragments of tubular adenoma and hyperplastic polyp    History of COVID-19 09/2021    Hypertension     Hypoxemia associated with sleep     Iron deficiency anemia     JUAN C (obstructive sleep apnea) 10/16/2023    Home sleep study.  Weight 325 pounds.  Severe JUAN C with AHI 40 events per hour.  Low oxygen saturation 68% and sleep-related hypoxia present for 114.6 minutes     Past Surgical History:   Procedure Laterality Date    CHOLECYSTECTOMY WITH INTRAOPERATIVE CHOLANGIOGRAM N/A 11/11/2022    Procedure: LAPAROSCOPIC CHOLECYSTECTOMY;  Surgeon: Nigel Tolentino MD;  Location: [facility] OR;  Service: General;  Laterality: N/A;    COLON RESECTION Right 11/11/2022    Procedure: COLON RESECTION RIGHT;  Surgeon: Nigel Tolentino MD;  Location: [facility] OR;  Service: General;  Laterality: Right;    COLONOSCOPY N/A 11/03/2022    Procedure: COLONOSCOPY into cecum with tattoo ink injection, bx's and cold bx/snare polypectomies;  Surgeon: Anup Oconnell MD;  Location: [facility] ENDOSCOPY;  Service: Gastroenterology;  Laterality: N/A;  pre: iron def anemia, heme positive stool  post: polyps, colon mass    COLONOSCOPY N/A 2/29/2024    Procedure: COLONOSCOPY;  Surgeon: Nigel Tolentino MD;  Location: [facility] OR;  Service:  Gastroenterology;  Laterality: N/A;  normal exam    ENDOSCOPY N/A 11/03/2022    Procedure: ESOPHAGOGASTRODUODENOSCOPYr with bx;  Surgeon: Anup Oconnell MD;  Location: [facility] ENDOSCOPY;  Service: Gastroenterology;  Laterality: N/A;  pre:  iron def anemia, heme positive stool  post: gastritis     INGUINAL HERNIA REPAIR Bilateral 1970    LACERATION REPAIR Right     THUMB/ HAND       MEDICATIONS    Current Outpatient Medications:     amLODIPine (NORVASC) 10 MG tablet, , Disp: , Rfl:     aspirin 81 MG EC tablet, Take 1 tablet by mouth Daily., Disp: 30 tablet, Rfl: 6    hydroCHLOROthiazide (HYDRODIURIL) 25 MG tablet, , Disp: , Rfl:     losartan (Cozaar) 25 MG tablet, Take 1 tablet by mouth Daily., Disp: 30 tablet, Rfl: 6    meloxicam (MOBIC) 7.5 MG tablet, Take 1 tablet by mouth., Disp: , Rfl:     nebivolol (Bystolic) 2.5 MG tablet, Take 1 tablet by mouth Daily., Disp: 30 tablet, Rfl: 6    ALLERGIES:   No Known Allergies    SOCIAL HISTORY:       Social History     Socioeconomic History    Marital status: Single   Tobacco Use    Smoking status: Never     Passive exposure: Never    Smokeless tobacco: Former     Types: Chew    Tobacco comments:     Caffeine: coffee    Vaping Use    Vaping status: Never Used   Substance and Sexual Activity    Alcohol use: Yes     Comment: rarely    Drug use: Never    Sexual activity: Defer     Partners: Female         FAMILY HISTORY:  Family History   Problem Relation Age of Onset    Heart failure Mother     Clotting disorder Father         DVT    Lung cancer Sister     Hypertension Brother     Colon cancer Neg Hx     Crohn's disease Neg Hx     Colon polyps Neg Hx     Irritable bowel syndrome Neg Hx     Ulcerative colitis Neg Hx     Malig Hyperthermia Neg Hx        REVIEW OF SYSTEMS:  Review of Systems   Constitutional:  Negative for activity change.   HENT:  Negative for nosebleeds and trouble swallowing.    Respiratory:  Negative for shortness of breath and wheezing.    Cardiovascular:  " Negative for chest pain and palpitations.   Gastrointestinal:  Negative for diarrhea and nausea.   Genitourinary:  Negative for dysuria and hematuria.   Musculoskeletal:  Negative for arthralgias and myalgias.   Neurological:  Negative for seizures and syncope.   Hematological:  Negative for adenopathy. Does not bruise/bleed easily.   Psychiatric/Behavioral:  Negative for confusion.               Vitals:    05/31/24 1119   BP: 144/92   Pulse: 68   Resp: 16   Temp: 97.6 °F (36.4 °C)   TempSrc: Oral   SpO2: 96%   Weight: (!) 150 kg (330 lb 8 oz)   Height: 188 cm (74.02\")   PainSc: 0-No pain             5/31/2024    11:21 AM   Current Status   ECOG score 0      PHYSICAL EXAM:        CONSTITUTIONAL:  Vital signs reviewed.  No distress, looks comfortable.  EYES:  Conjunctiva and lids unremarkable.  PERRLA  EARS,NOSE,MOUTH,THROAT:  Ears and nose appear unremarkable.  Lips, teeth, gums appear unremarkable.  RESPIRATORY:  Normal respiratory effort.  Lungs clear to auscultation bilaterally.  CARDIOVASCULAR:  Normal S1, S2.  No murmurs rubs or gallops.  No significant lower extremity edema.  GASTROINTESTINAL: Abdomen appears unremarkable.  Nontender.  No hepatomegaly.  No splenomegaly.  LYMPHATIC:  No cervical, supraclavicular, axillary lymphadenopathy.  SKIN:  Warm.  No rashes.  PSYCHIATRIC:  Normal judgment and insight.  Normal mood and affect.        RECENT LABS:        WBC   Date Value Ref Range Status   05/31/2024 7.09 3.40 - 10.80 10*3/mm3 Final   12/01/2023 8.41 3.40 - 10.80 10*3/mm3 Final   09/08/2023 7.78 3.40 - 10.80 10*3/mm3 Final   09/02/2023 11.58 (H) 3.40 - 10.80 10*3/mm3 Final   09/01/2023 7.72 3.40 - 10.80 10*3/mm3 Final   05/16/2023 8.24 3.40 - 10.80 10*3/mm3 Final   03/28/2023 5.94 3.40 - 10.80 10*3/mm3 Final   02/28/2023 4.78 3.40 - 10.80 10*3/mm3 Final   02/07/2023 4.77 3.40 - 10.80 10*3/mm3 Final   01/17/2023 5.44 3.40 - 10.80 10*3/mm3 Final   01/10/2023 5.67 3.40 - 10.80 10*3/mm3 Final   12/27/2022 8.46 " 3.40 - 10.80 10*3/mm3 Final   12/05/2022 6.39 3.40 - 10.80 10*3/mm3 Final   11/29/2022 4.09 3.40 - 10.80 10*3/mm3 Final   11/12/2022 13.53 (H) 3.40 - 10.80 10*3/mm3 Final   11/07/2022 7.22 3.40 - 10.80 10*3/mm3 Final   10/20/2022 6.24 3.40 - 10.80 10*3/mm3 Final     Hemoglobin   Date Value Ref Range Status   05/31/2024 15.5 13.0 - 17.7 g/dL Final   12/01/2023 15.3 13.0 - 17.7 g/dL Final   09/08/2023 14.6 13.0 - 17.7 g/dL Final   09/02/2023 14.6 13.0 - 17.7 g/dL Final   09/01/2023 14.8 13.0 - 17.7 g/dL Final   05/16/2023 14.2 13.0 - 17.7 g/dL Final   03/28/2023 13.8 13.0 - 17.7 g/dL Final   02/28/2023 12.0 (L) 13.0 - 17.7 g/dL Final   02/07/2023 12.5 (L) 13.0 - 17.7 g/dL Final   01/17/2023 12.4 (L) 13.0 - 17.7 g/dL Final   01/10/2023 12.2 (L) 13.0 - 17.7 g/dL Final   12/27/2022 11.9 (L) 13.0 - 17.7 g/dL Final   12/05/2022 11.1 (L) 13.0 - 17.7 g/dL Final   11/29/2022 10.1 (L) 13.0 - 17.7 g/dL Final   11/12/2022 8.2 (L) 13.0 - 17.7 g/dL Final   11/07/2022 8.4 (L) 13.0 - 17.7 g/dL Final   10/20/2022 9.0 (L) 13.0 - 17.7 g/dL Final     Platelets   Date Value Ref Range Status   05/31/2024 210 140 - 450 10*3/mm3 Final   12/01/2023 227 140 - 450 10*3/mm3 Final   09/08/2023 209 140 - 450 10*3/mm3 Final   09/02/2023 239 140 - 450 10*3/mm3 Final   09/01/2023 221 140 - 450 10*3/mm3 Final   05/16/2023 213 140 - 450 10*3/mm3 Final   03/28/2023 200 140 - 450 10*3/mm3 Final   02/28/2023 174 140 - 450 10*3/mm3 Final   02/07/2023 169 140 - 450 10*3/mm3 Final   01/17/2023 170 140 - 450 10*3/mm3 Final   01/10/2023 202 140 - 450 10*3/mm3 Final   12/27/2022 285 140 - 450 10*3/mm3 Final   12/05/2022 299 140 - 450 10*3/mm3 Final   11/29/2022 327 140 - 450 10*3/mm3 Final   11/12/2022 321 140 - 450 10*3/mm3 Final   11/07/2022 330 140 - 450 10*3/mm3 Final   10/20/2022 327 140 - 450 10*3/mm3 Final       Assessment & Plan   There are no diagnoses linked to this encounter.        Damián Diaz   *Ascending colon adenocarcinoma  Discovered on  colonoscopy 11/3/2022, Dr. Oconnell, for MINA work-up.  Invasive moderately differentiated adenocarcinoma.  CT AP 11/7/2022: Circumferential malignancy ascending colon with adjacent mesenteric LAD.  4 mm RML nodule stable from 6/8/2022.  Radiologist felt likely benign but recommended continued follow-up.  Granulomatous calcifications both lower lobes.  Resection 11/11/2022, Dr. Tolentino: Moderately differentiated adenocarcinoma of cecum, 5.2 cm.  Grade 2.  Invades through muscularis propria into pericolic fat.  Margins negative.  No perineural invasion or lymphovascular invasion.  One of the 30 nodes positive.  [id number], stage 3  No deficiency of MMR proteins.  (pMMR) (consistent with STEFANIA)  Per NCCN guidelines: Low risk stage III colon cancer preferred regimens are Capeox x3 months versus FOLFOX x3 to 6 months.  Reviewed these options with the patient.  He has chosen Capeox x3 months  Adjuvant CAPEOX x4 cycles 12/27/22-2/28/2023  Oxaliplatin D1.  Xeloda 850 mg/m2 per dose (2150 mg rounding for tablet strength), twice per day, D1-14/21 days.  4 cycles total, which is 3 months of therapy.  CT 5/16/2023: 2 right mesenteric nodes near the anastomotic sites, anterior to lower pole of right kidney, mildly enlarged.  Radiologist stated these could be recurrence or reactive and recommended either follow-up CT 3 months or PET.  Discussed with Dr. Tolentino.  He states these nodes cannot be reached by CT-guided needle biopsy.  I asked Dr. Tolentino if a PET scan is done and it shows activity in these 2 mesenteric nodes if he felt the patient should have an operation (knowing reactive nodes can show activity on PET).  Dr. Tolentino recommends not doing a PET scan and instead doing a follow-up CT in 3 months.  I agree.  Discussed all of this with the patient and he agrees as well.  CT 9/1/2023: Stable pulmonary nodules, no other recurrence.  Specifically the mesenteric nodes are read as stable, 9 and 6 mm in short axis.  CT 11/27/2023:  Previously mentioned tiny RML nodule is now thought to represent a calcified granuloma.  Additional benign calcified granulomas elsewhere in both lungs.  Stable 2 small mesenteric nodes near the anastomosis.  Return to 6-month follow-up CT plan.  CT 5/24/2024: Stable RML 4 mm nodule.  2 small stable nodes lateral to anastomosis of right hemicolectomy.  Radiologist stated likely benign but recommend continued follow-up CT.    *Pulmonary nodules  Although only seen in hindsight, first seen as a RML pulmonary nodule on CT 6/8/2022, per CT chest 12/20/2022, when it was read as unchanged from 6/8/2022  CT 5/16/2023: No change in 4 mm RML nodule from 6/8/2022.  CT 9/1/23: Stable pulmonary nodules.  For example TIFFANY 5 mm nodule.  Note previously only and RML pulmonary nodule was read but now in hindsight the radiologist states stable pulmonary nodules and specifically mentions an TIFFANY nodule at 5 mm.  CT 11/27/2023: This was read as stable benign calcified granulomas bilateral lungs  CT 5/24/2024: Stable RML 4 mm nodule    *The day after his CT went to the ER with right flank pain radiating to RLQ.  CT repeated on 9/2/2023: Right ureteral stone was found.  He was sent home from the ER    *Iron deficiency anemia  6/21/2022: Hb 7.3.  Oral iron daily started.  Patient states early October 2022 Hb around 8.  10/20/2022 (initial consult with me): Ferritin 9.  3% saturation.  Hb 9.  Patient states he cannot tolerate oral iron anymore due to constipation and abdominal cramping.    Insurance requires Venofer instead of Injectafer  Completed 1000 mg Venofer on 11/29/2022.  12/5/2022: Ferritin 124, 39% saturation.  Hb 15.5    *Source of iron deficiency  Colon cancer found and resected on 11/11/2022    *Microcytosis, likely due to iron deficiency  MCV pending    *Lightheadedness, dyspnea on exertion, fatigue.  Hopefully this will improve with IV iron.    *Acute kidney injury  Creatinine was up to 1.28 at early September 2023 ER visit  for kidney stones.  Because of this, creatinine repeated in our office, 2023 and has returned to baseline, 0.86.  Creatinine pending    *Coronary calcifications on CT, and several paternal uncles with CAD (dad  at the young age of 41 from PE)  2023: Referred to cardio oncology    *Class III obesity.  Being overweight can lead to cytopenias through hepatic steatosis.    Body mass index is 42.42 kg/m².  BMI 25 to <30 is overweight  BMI 30 to <35 is class 1 obesity  BMI 35 to <40 is class 2 obesity  BMI 40 or higher is class 3 obesity   Ideal body weight 181 pounds  Remains overweight.  Ideally, lose weight    *Aortic aneurysm  CT chest angiogram : Ascending aorta 4.2 cm.  Following with cardio oncology    Plan  Labs were not done with CT.  Await remaining labs.  MD 6 months.  1 week prior:  CT CAP with contrast, CBC, CMP, CEA.   Cardio oncology has requested CT chest be CT chest angiogram, to follow the aneurysm  Plan CTs every 6 months x 5 years)  Last colonoscopy 2024: Unremarkable.  Dr. Tolentino recommended the next 3 years later

## 2024-05-31 ENCOUNTER — LAB (OUTPATIENT)
Dept: OTHER | Facility: HOSPITAL | Age: 59
End: 2024-05-31
Payer: MEDICAID

## 2024-05-31 ENCOUNTER — OFFICE VISIT (OUTPATIENT)
Dept: ONCOLOGY | Facility: CLINIC | Age: 59
End: 2024-05-31
Payer: MEDICAID

## 2024-05-31 ENCOUNTER — TELEPHONE (OUTPATIENT)
Dept: ONCOLOGY | Facility: CLINIC | Age: 59
End: 2024-05-31
Payer: MEDICAID

## 2024-05-31 VITALS
DIASTOLIC BLOOD PRESSURE: 92 MMHG | TEMPERATURE: 97.6 F | HEIGHT: 74 IN | SYSTOLIC BLOOD PRESSURE: 144 MMHG | RESPIRATION RATE: 16 BRPM | OXYGEN SATURATION: 96 % | HEART RATE: 68 BPM | WEIGHT: 315 LBS | BODY MASS INDEX: 40.43 KG/M2

## 2024-05-31 DIAGNOSIS — C18.2 CANCER OF ASCENDING COLON: Primary | ICD-10-CM

## 2024-05-31 DIAGNOSIS — C18.2 CANCER OF ASCENDING COLON: ICD-10-CM

## 2024-05-31 LAB
ALBUMIN SERPL-MCNC: 4.3 G/DL (ref 3.5–5.2)
ALBUMIN/GLOB SERPL: 1.3 G/DL
ALP SERPL-CCNC: 83 U/L (ref 39–117)
ALT SERPL W P-5'-P-CCNC: 25 U/L (ref 1–41)
ANION GAP SERPL CALCULATED.3IONS-SCNC: 8.4 MMOL/L (ref 5–15)
AST SERPL-CCNC: 24 U/L (ref 1–40)
BASOPHILS # BLD AUTO: 0.04 10*3/MM3 (ref 0–0.2)
BASOPHILS NFR BLD AUTO: 0.6 % (ref 0–1.5)
BILIRUB SERPL-MCNC: 0.3 MG/DL (ref 0–1.2)
BUN SERPL-MCNC: 16 MG/DL (ref 6–20)
BUN/CREAT SERPL: 17.4 (ref 7–25)
CALCIUM SPEC-SCNC: 9.5 MG/DL (ref 8.6–10.5)
CEA SERPL-MCNC: 1.34 NG/ML
CHLORIDE SERPL-SCNC: 103 MMOL/L (ref 98–107)
CO2 SERPL-SCNC: 27.6 MMOL/L (ref 22–29)
CREAT SERPL-MCNC: 0.92 MG/DL (ref 0.76–1.27)
DEPRECATED RDW RBC AUTO: 43.9 FL (ref 37–54)
EGFRCR SERPLBLD CKD-EPI 2021: 97.6 ML/MIN/1.73
EOSINOPHIL # BLD AUTO: 0.18 10*3/MM3 (ref 0–0.4)
EOSINOPHIL NFR BLD AUTO: 2.5 % (ref 0.3–6.2)
ERYTHROCYTE [DISTWIDTH] IN BLOOD BY AUTOMATED COUNT: 13.8 % (ref 12.3–15.4)
GLOBULIN UR ELPH-MCNC: 3.4 GM/DL
GLUCOSE SERPL-MCNC: 87 MG/DL (ref 65–99)
HCT VFR BLD AUTO: 45.7 % (ref 37.5–51)
HGB BLD-MCNC: 15.5 G/DL (ref 13–17.7)
IMM GRANULOCYTES # BLD AUTO: 0.03 10*3/MM3 (ref 0–0.05)
IMM GRANULOCYTES NFR BLD AUTO: 0.4 % (ref 0–0.5)
LYMPHOCYTES # BLD AUTO: 3.38 10*3/MM3 (ref 0.7–3.1)
LYMPHOCYTES NFR BLD AUTO: 47.7 % (ref 19.6–45.3)
MCH RBC QN AUTO: 29.5 PG (ref 26.6–33)
MCHC RBC AUTO-ENTMCNC: 33.9 G/DL (ref 31.5–35.7)
MCV RBC AUTO: 87 FL (ref 79–97)
MONOCYTES # BLD AUTO: 0.6 10*3/MM3 (ref 0.1–0.9)
MONOCYTES NFR BLD AUTO: 8.5 % (ref 5–12)
NEUTROPHILS NFR BLD AUTO: 2.86 10*3/MM3 (ref 1.7–7)
NEUTROPHILS NFR BLD AUTO: 40.3 % (ref 42.7–76)
NRBC BLD AUTO-RTO: 0 /100 WBC (ref 0–0.2)
PLATELET # BLD AUTO: 210 10*3/MM3 (ref 140–450)
PMV BLD AUTO: 9 FL (ref 6–12)
POTASSIUM SERPL-SCNC: 3.9 MMOL/L (ref 3.5–5.2)
PROT SERPL-MCNC: 7.7 G/DL (ref 6–8.5)
RBC # BLD AUTO: 5.25 10*6/MM3 (ref 4.14–5.8)
SODIUM SERPL-SCNC: 139 MMOL/L (ref 136–145)
WBC NRBC COR # BLD AUTO: 7.09 10*3/MM3 (ref 3.4–10.8)

## 2024-05-31 PROCEDURE — 36415 COLL VENOUS BLD VENIPUNCTURE: CPT

## 2024-05-31 PROCEDURE — 99214 OFFICE O/P EST MOD 30 MIN: CPT | Performed by: INTERNAL MEDICINE

## 2024-05-31 PROCEDURE — 85025 COMPLETE CBC W/AUTO DIFF WBC: CPT | Performed by: INTERNAL MEDICINE

## 2024-05-31 PROCEDURE — 1126F AMNT PAIN NOTED NONE PRSNT: CPT | Performed by: INTERNAL MEDICINE

## 2024-05-31 PROCEDURE — 80053 COMPREHEN METABOLIC PANEL: CPT | Performed by: INTERNAL MEDICINE

## 2024-05-31 PROCEDURE — 82378 CARCINOEMBRYONIC ANTIGEN: CPT | Performed by: INTERNAL MEDICINE

## 2024-05-31 NOTE — TELEPHONE ENCOUNTER
Per Dr. Ansari: Leonid Chowdary,     Please call him and let him know his labs looked good today.     Thanks!

## 2024-07-30 ENCOUNTER — OFFICE VISIT (OUTPATIENT)
Dept: CARDIOLOGY | Facility: CLINIC | Age: 59
End: 2024-07-30
Payer: MEDICAID

## 2024-07-30 VITALS
HEIGHT: 74 IN | RESPIRATION RATE: 18 BRPM | OXYGEN SATURATION: 98 % | HEART RATE: 62 BPM | BODY MASS INDEX: 40.43 KG/M2 | SYSTOLIC BLOOD PRESSURE: 138 MMHG | DIASTOLIC BLOOD PRESSURE: 84 MMHG | WEIGHT: 315 LBS

## 2024-07-30 DIAGNOSIS — E66.01 CLASS 3 SEVERE OBESITY DUE TO EXCESS CALORIES WITH SERIOUS COMORBIDITY AND BODY MASS INDEX (BMI) OF 40.0 TO 44.9 IN ADULT: ICD-10-CM

## 2024-07-30 DIAGNOSIS — C18.2 CANCER OF ASCENDING COLON: ICD-10-CM

## 2024-07-30 DIAGNOSIS — I25.810 CORONARY ARTERY DISEASE INVOLVING CORONARY BYPASS GRAFT OF NATIVE HEART WITHOUT ANGINA PECTORIS: Primary | ICD-10-CM

## 2024-07-30 DIAGNOSIS — I71.21 ANEURYSM OF ASCENDING AORTA WITHOUT RUPTURE: ICD-10-CM

## 2024-07-30 DIAGNOSIS — G47.33 OSA (OBSTRUCTIVE SLEEP APNEA): ICD-10-CM

## 2024-07-30 PROCEDURE — 1160F RVW MEDS BY RX/DR IN RCRD: CPT | Performed by: NURSE PRACTITIONER

## 2024-07-30 PROCEDURE — 93000 ELECTROCARDIOGRAM COMPLETE: CPT | Performed by: NURSE PRACTITIONER

## 2024-07-30 PROCEDURE — 1159F MED LIST DOCD IN RCRD: CPT | Performed by: NURSE PRACTITIONER

## 2024-07-30 PROCEDURE — 99214 OFFICE O/P EST MOD 30 MIN: CPT | Performed by: NURSE PRACTITIONER

## 2024-07-30 RX ORDER — LOSARTAN POTASSIUM 50 MG/1
50 TABLET ORAL DAILY
Qty: 90 TABLET | Refills: 3 | Status: SHIPPED | OUTPATIENT
Start: 2024-07-30

## 2024-07-30 NOTE — PROGRESS NOTES
"Date of Office Visit: 2024  Encounter Provider: ROSEMARIE Ariza  Place of Service: [facility] CARDIOLOGY  Patient Name: Damián Diaz  :1965    Chief complaint  Hypertension, coronary artery disease, aortic aneurysm     History of Present Illness  Patient is a 58 y.o. year old male  patient of Dr. Perea. Past medical history includes colon cancer, hypertension, iron deficiency anemia, family history of premature coronary artery disease (father  at 41 with pulmonary emboli, ascending aortic aneurysm and artery calcification.  He has a history of colon cancer diagnosed on 2022 he underwent resection at the time.  He completed adjuvant therapy with CapeOx (oxaliplatin and Xeloda).  Follow-up CT shows mildly enlarged mesenteric lymph nodes for which follow-up with pet imaging has been recommended.  This is felt to be stable.  He also has pulmonary nodules which are felt to be stable.  Perfusion study on 10/2023 was negative for ischemia.  Last CT on 2023 showed acing aorta measuring 4.7 cm and felt to be stable though prior CT report documented 4.5cm.     Patient had an echocardiogram on 2022 and then on 2023 with evidence of hypertensive heart disease, normal systolic function and a mildly dilated ascending aorta measuring 4.3 cm.  Last  CT scan of the chest (2023) with contrast showed calcified plaque in the mid LAD to my review of images.  RCA and circumflex less well seen.  Mediastinal vascular was \"felt to be normal\".  Right renal calculi noted with mild hydronephrosis on the right.  Patient had a CT scan of the chest on 2023.  The ascending thoracic aorta was noted by radiology to measure 4.7 cm at its midportion and it was felt to be unchanged from prior study by Dr. Murphy though prior report had said 4.5 cm.    Interval history  Patient presents today for routine follow-up.  I will visit with him today and have reviewed his medical " record.  Since last visit he is wearing CPAP consistently.  He denies palpitations, shortness of breath, edema, dizziness, chest pain or chest pressure, fatigue, syncope or presyncope.  He does not exercise but he is very active doing yard work and housework and denies exertional symptoms.  Blood pressure today is slightly elevated in office.  It appears it has been been elevated at other visits recently as well.  He he is currently maintained on amlodipine 10 mg daily, hydrochlorothiazide 25 mg daily, losartan 25 mg daily, and Bystolic 2.5 mg daily.    Past Medical History:   Diagnosis Date    Chronic cough     SINCE COVID DIAGNOSIS 9/2021    Colon cancer 11/03/2022    Ascending colon invasive moderately differentiated adenocarcinoma    Colon polyps 11/03/2022    Transverse colon: fragments of tubular adenoma, rectum: fragments of tubular adenoma and hyperplastic polyp    History of COVID-19 09/2021    Hypertension     Hypoxemia associated with sleep     Iron deficiency anemia     JUAN C (obstructive sleep apnea) 10/16/2023    Home sleep study.  Weight 325 pounds.  Severe JUAN C with AHI 40 events per hour.  Low oxygen saturation 68% and sleep-related hypoxia present for 114.6 minutes     Past Surgical History:   Procedure Laterality Date    CHOLECYSTECTOMY WITH INTRAOPERATIVE CHOLANGIOGRAM N/A 11/11/2022    Procedure: LAPAROSCOPIC CHOLECYSTECTOMY;  Surgeon: Ngiel Tolentino MD;  Location: [facility] OR;  Service: General;  Laterality: N/A;    COLON RESECTION Right 11/11/2022    Procedure: COLON RESECTION RIGHT;  Surgeon: Nigel Tolentino MD;  Location: [facility] OR;  Service: General;  Laterality: Right;    COLONOSCOPY N/A 11/03/2022    Procedure: COLONOSCOPY into cecum with tattoo ink injection, bx's and cold bx/snare polypectomies;  Surgeon: Anup Oconnell MD;  Location: [facility] ENDOSCOPY;  Service: Gastroenterology;  Laterality: N/A;  pre: iron def anemia, heme positive stool  post: polyps, colon mass     COLONOSCOPY N/A 2/29/2024    Procedure: COLONOSCOPY;  Surgeon: Nigel Tolentino MD;  Location:  [facility] OR;  Service: Gastroenterology;  Laterality: N/A;  normal exam    ENDOSCOPY N/A 11/03/2022    Procedure: ESOPHAGOGASTRODUODENOSCOPYr with bx;  Surgeon: Anup Oconnell MD;  [facility] ENDOSCOPY;  Service: Gastroenterology;  Laterality: N/A;  pre:  iron def anemia, heme positive stool  post: gastritis     INGUINAL HERNIA REPAIR Bilateral 1970    LACERATION REPAIR Right     THUMB/ HAND     Outpatient Medications Prior to Visit   Medication Sig Dispense Refill    amLODIPine (NORVASC) 10 MG tablet       aspirin 81 MG EC tablet Take 1 tablet by mouth Daily. 30 tablet 6    hydroCHLOROthiazide (HYDRODIURIL) 25 MG tablet       losartan (Cozaar) 25 MG tablet Take 1 tablet by mouth Daily. 30 tablet 6    meloxicam (MOBIC) 7.5 MG tablet Take 1 tablet by mouth.      nebivolol (Bystolic) 2.5 MG tablet Take 1 tablet by mouth Daily. 30 tablet 6     No facility-administered medications prior to visit.       Allergies as of 07/30/2024    (No Known Allergies)     Social History     Socioeconomic History    Marital status: Single   Tobacco Use    Smoking status: Never     Passive exposure: Never    Smokeless tobacco: Former     Types: Chew    Tobacco comments:     Caffeine: coffee    Vaping Use    Vaping status: Never Used   Substance and Sexual Activity    Alcohol use: Yes     Comment: rarely    Drug use: Never    Sexual activity: Defer     Partners: Female     Family History   Problem Relation Age of Onset    Heart failure Mother     Clotting disorder Father         DVT    Lung cancer Sister     Hypertension Brother     Colon cancer Neg Hx     Crohn's disease Neg Hx     Colon polyps Neg Hx     Irritable bowel syndrome Neg Hx     Ulcerative colitis Neg Hx     Malig Hyperthermia Neg Hx      Review of Systems   Constitutional: Negative for malaise/fatigue.   Cardiovascular:  Negative for chest pain, claudication, dyspnea on  "exertion, leg swelling, near-syncope, orthopnea, palpitations, paroxysmal nocturnal dyspnea and syncope.   Respiratory:  Negative for shortness of breath.    Neurological:  Negative for brief paralysis, dizziness, headaches and light-headedness.   All other systems reviewed and are negative.       Objective:     Vitals:    07/30/24 0948   BP: 138/84   BP Location: Left arm   Patient Position: Sitting   Cuff Size: Large Adult   Pulse: 62   Resp: 18   SpO2: 98%   Weight: (!) 150 kg (330 lb)   Height: 188 cm (74\")     Body mass index is 42.37 kg/m².    Vitals reviewed.   Constitutional:       General: Not in acute distress.     Appearance: Well-developed and not in distress. Not diaphoretic.   HENT:      Head: Normocephalic.   Pulmonary:      Effort: Pulmonary effort is normal. No respiratory distress.      Breath sounds: Normal breath sounds. No wheezing. No rhonchi. No rales.   Cardiovascular:      Normal rate. Regular rhythm.      Murmurs: There is no murmur.   Pulses:     Radial: 2+ bilaterally.  Edema:     Peripheral edema absent.   Skin:     General: Skin is warm and dry. There is no cyanosis.      Findings: No rash.   Neurological:      Mental Status: Alert and oriented to person, place, and time.   Psychiatric:         Behavior: Behavior normal. Behavior is cooperative.         Thought Content: Thought content normal.         Judgment: Judgment normal.       Lab Review:     Lab Results   Component Value Date     05/31/2024     12/01/2023    K 3.9 05/31/2024    K 3.9 12/01/2023     05/31/2024     12/01/2023    CO2 27.6 05/31/2024    CO2 26.3 12/01/2023    BUN 16 05/31/2024    BUN 12 12/01/2023    CREATININE 0.92 05/31/2024    CREATININE 0.88 12/01/2023    GLUCOSE 87 05/31/2024    GLUCOSE 96 12/01/2023    CALCIUM 9.5 05/31/2024    CALCIUM 9.7 12/01/2023    ALBUMIN 4.3 05/31/2024    ALBUMIN 4.3 12/01/2023    BILITOT 0.3 05/31/2024    BILITOT 0.4 12/01/2023    AST 24 05/31/2024    AST 23 " 12/01/2023    ALT 25 05/31/2024    ALT 22 12/01/2023     Lab Results   Component Value Date    WBC 7.09 05/31/2024    WBC 8.41 12/01/2023    HGB 15.5 05/31/2024    HGB 15.3 12/01/2023    HCT 45.7 05/31/2024    HCT 47.3 12/01/2023    MCV 87.0 05/31/2024    MCV 85.1 12/01/2023     05/31/2024     12/01/2023            ECG 12 Lead    Date/Time: 7/30/2024 10:01 AM  Performed by: Stephanie Glass APRN    Authorized by: Stephanie Glass APRN  Comparison: compared with previous ECG   Similar to previous ECG  Rhythm: sinus rhythm  Rate: normal  BPM: 62  QRS axis: normal  Comments: Similar to prior        Assessment:       Diagnosis Plan   1. Coronary artery disease involving coronary bypass graft of native heart without angina pectoris        2. Class 3 severe obesity due to excess calories with serious comorbidity and body mass index (BMI) of 40.0 to 44.9 in adult        3. Cancer of ascending colon        4. JUAN C (obstructive sleep apnea)        5. Aneurysm of ascending aorta without rupture          Plan:       1.  Coronary artery disease with coronary artery calcification on recent CT imaging.  This is predominantly visualized in the LAD the circumflex and right coronary artery, partially visualized.  Perfusion study 10/2023 negative for ischemia.  Patient is on low-dose aspirin therapy.  Continue risk factor modification.  Doing relatively well with no anginal symptoms.   2.  Aortic valve calcification with family history of bicuspid aortic valve.  Patient's valve leaflet number cannot be determined.  There is also concern of ascending aortic aneurysm.  We will plan on annual physical exams and repeat echo in 2 -3 years to assess the aortic valve.  3.  Probable ascending aortic aneurysm.  Noted by's echo measuring 4.3 cm with good image planes.  Subsequent CT scans of the chest gave dimensions of 4.4 in the ascending aorta though more recently on 11/2023 it was measured 4.7 cm.  CT angiogram in May  2024 showed stable aortic size with aortic root reportedly measuring 4.1 cm and ascending aorta at 4.2 cm.  There are orders for repeat CT scan in 1 year per Dr. Ansari.  4.  Hypertension with hypertensive heart disease.  Blood pressure at home has been high as it is today we will increase losartan to 50 mg daily.  He will continue to monitor blood pressure at home with goal blood pressure less than 130/80.  3.  Unknown lipid status.  Lipid panel has been ordered but not completed.  He has appointment to follow-up with Dr. Dallas in the near future.  4.  Colon cancer status post resection with chemotherapy.  5.  Severe obstructive sleep apnea with severe hypoxia noted by home sleep study 10/2023.  Now on CPAP.  6.  Obesity.  He asked me if this would help his situation I certainly think weight loss would be of benefit.  He wished to consider treatment with Wegovy.  I have asked him to follow-up with Dr. Dallas in regards to this as I do not prescribe this drug.        Time Spent: I spent 30 minutes caring for Damián on this date of service. This time includes time spent by me in the following activities: preparing for the visit, reviewing tests, performing a medically appropriate examination and/or evaluations, counseling and educating the patient/family/caregiver, ordering medications, tests, or procedures, documenting information in the medical record, and independently interpreting results and communicating that information with the patient/family/caregiver.   I spent 1 minutes on the separately reported service of ECG. This time is not included in the time used to support the E/M service also reported today.        Your medication list            Accurate as of July 30, 2024 10:02 AM. If you have any questions, ask your nurse or doctor.                CONTINUE taking these medications        Instructions Last Dose Given Next Dose Due   amLODIPine 10 MG tablet  Commonly known as: NORVASC           aspirin 81 MG  EC tablet      Take 1 tablet by mouth Daily.       hydroCHLOROthiazide 25 MG tablet           losartan 25 MG tablet  Commonly known as: Cozaar      Take 1 tablet by mouth Daily.       meloxicam 7.5 MG tablet  Commonly known as: MOBIC      Take 1 tablet by mouth.       nebivolol 2.5 MG tablet  Commonly known as: Bystolic      Take 1 tablet by mouth Daily.                Patient is no longer taking -.  I corrected the med list to reflect this.  I did not stop these medications.    No follow-ups on file.      Dictated utilizing Dragon dictation

## 2024-10-30 RX ORDER — ASPIRIN 81 MG/1
81 TABLET ORAL DAILY
Qty: 30 TABLET | Refills: 6 | Status: SHIPPED | OUTPATIENT
Start: 2024-10-30

## 2024-11-26 ENCOUNTER — PRIOR AUTHORIZATION (OUTPATIENT)
Dept: ONCOLOGY | Facility: CLINIC | Age: 59
End: 2024-11-26
Payer: MEDICAID

## 2024-11-26 NOTE — TELEPHONE ENCOUNTER
No PA required for FLOW [id number] FLOW INTERPRETATION [id number] MORPHOLOGY [id number] and  CHROMOSOMES [id number] per [facility]. Ref # [id number]. Ok'd lab to send to [facility].

## 2024-12-04 ENCOUNTER — LAB (OUTPATIENT)
Dept: LAB | Facility: HOSPITAL | Age: 59
End: 2024-12-04
Payer: MEDICAID

## 2024-12-04 ENCOUNTER — HOSPITAL ENCOUNTER (OUTPATIENT)
Dept: CT IMAGING | Facility: HOSPITAL | Age: 59
Discharge: HOME | End: 2024-12-04
Payer: MEDICAID

## 2024-12-04 DIAGNOSIS — C18.2 CANCER OF ASCENDING COLON: ICD-10-CM

## 2024-12-04 LAB
ALBUMIN SERPL-MCNC: 4.4 G/DL (ref 3.5–5.2)
ALBUMIN/GLOB SERPL: 1.3 G/DL
ALP SERPL-CCNC: 71 U/L (ref 39–117)
ALT SERPL W P-5'-P-CCNC: 24 U/L (ref 1–41)
ANION GAP SERPL CALCULATED.3IONS-SCNC: 11.7 MMOL/L (ref 5–15)
AST SERPL-CCNC: 23 U/L (ref 1–40)
BASOPHILS # BLD AUTO: 0.06 10*3/MM3 (ref 0–0.2)
BASOPHILS NFR BLD AUTO: 0.7 % (ref 0–1.5)
BILIRUB SERPL-MCNC: 0.4 MG/DL (ref 0–1.2)
BUN SERPL-MCNC: 13 MG/DL (ref 6–20)
BUN/CREAT SERPL: 14.4 (ref 7–25)
CALCIUM SPEC-SCNC: 9.6 MG/DL (ref 8.6–10.5)
CEA SERPL-MCNC: 1.19 NG/ML
CHLORIDE SERPL-SCNC: 99 MMOL/L (ref 98–107)
CO2 SERPL-SCNC: 25.3 MMOL/L (ref 22–29)
CREAT SERPL-MCNC: 0.9 MG/DL (ref 0.76–1.27)
DEPRECATED RDW RBC AUTO: 40.7 FL (ref 37–54)
EGFRCR SERPLBLD CKD-EPI 2021: 99.6 ML/MIN/1.73
EOSINOPHIL # BLD AUTO: 0.14 10*3/MM3 (ref 0–0.4)
EOSINOPHIL NFR BLD AUTO: 1.6 % (ref 0.3–6.2)
ERYTHROCYTE [DISTWIDTH] IN BLOOD BY AUTOMATED COUNT: 13.1 % (ref 12.3–15.4)
GLOBULIN UR ELPH-MCNC: 3.5 GM/DL
GLUCOSE SERPL-MCNC: 100 MG/DL (ref 65–99)
HCT VFR BLD AUTO: 47.8 % (ref 37.5–51)
HGB BLD-MCNC: 16.5 G/DL (ref 13–17.7)
IMM GRANULOCYTES # BLD AUTO: 0.04 10*3/MM3 (ref 0–0.05)
IMM GRANULOCYTES NFR BLD AUTO: 0.5 % (ref 0–0.5)
LYMPHOCYTES # BLD AUTO: 3.7 10*3/MM3 (ref 0.7–3.1)
LYMPHOCYTES NFR BLD AUTO: 43.4 % (ref 19.6–45.3)
MCH RBC QN AUTO: 29.7 PG (ref 26.6–33)
MCHC RBC AUTO-ENTMCNC: 34.5 G/DL (ref 31.5–35.7)
MCV RBC AUTO: 86.1 FL (ref 79–97)
MONOCYTES # BLD AUTO: 0.65 10*3/MM3 (ref 0.1–0.9)
MONOCYTES NFR BLD AUTO: 7.6 % (ref 5–12)
NEUTROPHILS NFR BLD AUTO: 3.94 10*3/MM3 (ref 1.7–7)
NEUTROPHILS NFR BLD AUTO: 46.2 % (ref 42.7–76)
NRBC BLD AUTO-RTO: 0 /100 WBC (ref 0–0.2)
PLATELET # BLD AUTO: 224 10*3/MM3 (ref 140–450)
PMV BLD AUTO: 8.9 FL (ref 6–12)
POTASSIUM SERPL-SCNC: 3.6 MMOL/L (ref 3.5–5.2)
PROT SERPL-MCNC: 7.9 G/DL (ref 6–8.5)
RBC # BLD AUTO: 5.55 10*6/MM3 (ref 4.14–5.8)
SODIUM SERPL-SCNC: 136 MMOL/L (ref 136–145)
WBC NRBC COR # BLD AUTO: 8.53 10*3/MM3 (ref 3.4–10.8)

## 2024-12-04 PROCEDURE — 80053 COMPREHEN METABOLIC PANEL: CPT

## 2024-12-04 PROCEDURE — 25510000002 DIATRIZOATE MEGLUMINE & SODIUM PER 1 ML: Performed by: INTERNAL MEDICINE

## 2024-12-04 PROCEDURE — 85025 COMPLETE CBC W/AUTO DIFF WBC: CPT

## 2024-12-04 PROCEDURE — 25510000001 IOPAMIDOL PER 1 ML: Performed by: INTERNAL MEDICINE

## 2024-12-04 PROCEDURE — 71275 CT ANGIOGRAPHY CHEST: CPT

## 2024-12-04 PROCEDURE — 82378 CARCINOEMBRYONIC ANTIGEN: CPT

## 2024-12-04 PROCEDURE — 74177 CT ABD & PELVIS W/CONTRAST: CPT

## 2024-12-04 PROCEDURE — 36415 COLL VENOUS BLD VENIPUNCTURE: CPT

## 2024-12-04 RX ORDER — DIATRIZOATE MEGLUMINE AND DIATRIZOATE SODIUM 660; 100 MG/ML; MG/ML
30 SOLUTION ORAL; RECTAL ONCE
Status: COMPLETED | OUTPATIENT
Start: 2024-12-04 | End: 2024-12-04

## 2024-12-04 RX ORDER — IOPAMIDOL 755 MG/ML
100 INJECTION, SOLUTION INTRAVASCULAR
Status: COMPLETED | OUTPATIENT
Start: 2024-12-04 | End: 2024-12-04

## 2024-12-04 RX ADMIN — DIATRIZOATE MEGLUMINE AND DIATRIZOATE SODIUM 30 ML: 660; 100 LIQUID ORAL; RECTAL at 14:00

## 2024-12-04 RX ADMIN — IOPAMIDOL 100 ML: 755 INJECTION, SOLUTION INTRAVENOUS at 15:40

## 2024-12-04 NOTE — PROGRESS NOTES
.     REASON FOR FOLLOWUP :   Resected colon cancer, iron deficiency anemia    HISTORY OF PRESENT ILLNESS:  The patient is a 59 y.o. year old male  who is here for follow-up with the above-mentioned history.    No problems eating.  No nausea.  No unexplained areas of pain.  However, he has had a new large floater in his right eye x 2 days.  He does not have an eye doctor    Past Medical History:   Diagnosis Date    Chronic cough     SINCE COVID DIAGNOSIS 9/2021    Colon cancer 11/03/2022    Ascending colon invasive moderately differentiated adenocarcinoma    Colon polyps 11/03/2022    Transverse colon: fragments of tubular adenoma, rectum: fragments of tubular adenoma and hyperplastic polyp    History of COVID-19 09/2021    Hypertension     Hypoxemia associated with sleep     Iron deficiency anemia     JUAN C (obstructive sleep apnea) 10/16/2023    Home sleep study.  Weight 325 pounds.  Severe JUAN C with AHI 40 events per hour.  Low oxygen saturation 68% and sleep-related hypoxia present for 114.6 minutes     Past Surgical History:   Procedure Laterality Date    CHOLECYSTECTOMY WITH INTRAOPERATIVE CHOLANGIOGRAM N/A 11/11/2022    Procedure: LAPAROSCOPIC CHOLECYSTECTOMY;  Surgeon: Nigel Tolentino MD;  Location: [facility] OR;  Service: General;  Laterality: N/A;    COLON RESECTION Right 11/11/2022    Procedure: COLON RESECTION RIGHT;  Surgeon: Nigel Tolentino MD;  Location: [facility] OR;  Service: General;  Laterality: Right;    COLONOSCOPY N/A 11/03/2022    Procedure: COLONOSCOPY into cecum with tattoo ink injection, bx's and cold bx/snare polypectomies;  Surgeon: Anup Oconnell MD;  Location: [facility] ENDOSCOPY;  Service: Gastroenterology;  Laterality: N/A;  pre: iron def anemia, heme positive stool  post: polyps, colon mass    COLONOSCOPY N/A 2/29/2024    Procedure: COLONOSCOPY;  Surgeon: Nigel Tolentino MD;  Location: [facility] OR;  Service: Gastroenterology;  Laterality: N/A;  normal exam    ENDOSCOPY N/A  11/03/2022    Procedure: ESOPHAGOGASTRODUODENOSCOPYr with bx;  Surgeon: Anup Oconnell MD;  Location: [facility] ENDOSCOPY;  Service: Gastroenterology;  Laterality: N/A;  pre:  iron def anemia, heme positive stool  post: gastritis     INGUINAL HERNIA REPAIR Bilateral 1970    LACERATION REPAIR Right     THUMB/ HAND       MEDICATIONS    Current Outpatient Medications:     amLODIPine (NORVASC) 10 MG tablet, , Disp: , Rfl:     aspirin (Aspirin Low Dose) 81 MG EC tablet, Take 1 tablet by mouth Daily., Disp: 30 tablet, Rfl: 6    hydroCHLOROthiazide (HYDRODIURIL) 25 MG tablet, , Disp: , Rfl:     losartan (Cozaar) 50 MG tablet, Take 1 tablet by mouth Daily., Disp: 90 tablet, Rfl: 3    nebivolol (Bystolic) 2.5 MG tablet, Take 1 tablet by mouth Daily., Disp: 30 tablet, Rfl: 6    meloxicam (MOBIC) 7.5 MG tablet, Take 1 tablet by mouth. (Patient not taking: Reported on 12/6/2024), Disp: , Rfl:     ALLERGIES:   No Known Allergies    SOCIAL HISTORY:       Social History     Socioeconomic History    Marital status: Single   Tobacco Use    Smoking status: Never     Passive exposure: Never    Smokeless tobacco: Former     Types: Chew    Tobacco comments:     Caffeine: coffee    Vaping Use    Vaping status: Never Used   Substance and Sexual Activity    Alcohol use: Yes     Comment: rarely    Drug use: Never    Sexual activity: Defer     Partners: Female         FAMILY HISTORY:  Family History   Problem Relation Age of Onset    Heart failure Mother     Clotting disorder Father         DVT    Lung cancer Sister     Hypertension Brother     Colon cancer Neg Hx     Crohn's disease Neg Hx     Colon polyps Neg Hx     Irritable bowel syndrome Neg Hx     Ulcerative colitis Neg Hx     Malig Hyperthermia Neg Hx        REVIEW OF SYSTEMS:  Review of Systems   Constitutional:  Negative for activity change.   HENT:  Negative for nosebleeds and trouble swallowing.    Respiratory:  Negative for shortness of breath and wheezing.    Cardiovascular:   "Negative for chest pain and palpitations.   Gastrointestinal:  Negative for diarrhea and nausea.   Genitourinary:  Negative for dysuria and hematuria.   Musculoskeletal:  Negative for arthralgias and myalgias.   Neurological:  Negative for seizures and syncope.   Hematological:  Negative for adenopathy. Does not bruise/bleed easily.   Psychiatric/Behavioral:  Negative for confusion.               Vitals:    12/06/24 0857   BP: 134/83   Pulse: 70   Resp: 16   Temp: 97.8 °F (36.6 °C)   TempSrc: Oral   SpO2: 96%   Weight: (!) 146 kg (322 lb)   Height: 188 cm (74.02\")   PainSc: 0-No pain               12/6/2024     8:57 AM   Current Status   ECOG score 0      PHYSICAL EXAM:        CONSTITUTIONAL:  Vital signs reviewed.  No distress, looks comfortable.  EYES:  Conjunctiva and lids unremarkable.  PERRLA  EARS,NOSE,MOUTH,THROAT:  Ears and nose appear unremarkable.  Lips, teeth, gums appear unremarkable.  RESPIRATORY:  Normal respiratory effort.  Lungs clear to auscultation bilaterally.  CARDIOVASCULAR:  Normal S1, S2.  No murmurs rubs or gallops.  No significant lower extremity edema.  GASTROINTESTINAL: Abdomen appears unremarkable.  Nontender.  No hepatomegaly.  No splenomegaly.  LYMPHATIC:  No cervical, supraclavicular, axillary lymphadenopathy.  SKIN:  Warm.  No rashes.  PSYCHIATRIC:  Normal judgment and insight.  Normal mood and affect.         RECENT LABS:        WBC   Date Value Ref Range Status   12/04/2024 8.53 3.40 - 10.80 10*3/mm3 Final   05/31/2024 7.09 3.40 - 10.80 10*3/mm3 Final   12/01/2023 8.41 3.40 - 10.80 10*3/mm3 Final   09/08/2023 7.78 3.40 - 10.80 10*3/mm3 Final   09/02/2023 11.58 (H) 3.40 - 10.80 10*3/mm3 Final   09/01/2023 7.72 3.40 - 10.80 10*3/mm3 Final   05/16/2023 8.24 3.40 - 10.80 10*3/mm3 Final   03/28/2023 5.94 3.40 - 10.80 10*3/mm3 Final   02/28/2023 4.78 3.40 - 10.80 10*3/mm3 Final   02/07/2023 4.77 3.40 - 10.80 10*3/mm3 Final   01/17/2023 5.44 3.40 - 10.80 10*3/mm3 Final   01/10/2023 5.67 " 3.40 - 10.80 10*3/mm3 Final   12/27/2022 8.46 3.40 - 10.80 10*3/mm3 Final   12/05/2022 6.39 3.40 - 10.80 10*3/mm3 Final   11/29/2022 4.09 3.40 - 10.80 10*3/mm3 Final   11/12/2022 13.53 (H) 3.40 - 10.80 10*3/mm3 Final   11/07/2022 7.22 3.40 - 10.80 10*3/mm3 Final   10/20/2022 6.24 3.40 - 10.80 10*3/mm3 Final     Hemoglobin   Date Value Ref Range Status   12/04/2024 16.5 13.0 - 17.7 g/dL Final   05/31/2024 15.5 13.0 - 17.7 g/dL Final   12/01/2023 15.3 13.0 - 17.7 g/dL Final   09/08/2023 14.6 13.0 - 17.7 g/dL Final   09/02/2023 14.6 13.0 - 17.7 g/dL Final   09/01/2023 14.8 13.0 - 17.7 g/dL Final   05/16/2023 14.2 13.0 - 17.7 g/dL Final   03/28/2023 13.8 13.0 - 17.7 g/dL Final   02/28/2023 12.0 (L) 13.0 - 17.7 g/dL Final   02/07/2023 12.5 (L) 13.0 - 17.7 g/dL Final   01/17/2023 12.4 (L) 13.0 - 17.7 g/dL Final   01/10/2023 12.2 (L) 13.0 - 17.7 g/dL Final   12/27/2022 11.9 (L) 13.0 - 17.7 g/dL Final   12/05/2022 11.1 (L) 13.0 - 17.7 g/dL Final   11/29/2022 10.1 (L) 13.0 - 17.7 g/dL Final   11/12/2022 8.2 (L) 13.0 - 17.7 g/dL Final   11/07/2022 8.4 (L) 13.0 - 17.7 g/dL Final   10/20/2022 9.0 (L) 13.0 - 17.7 g/dL Final     Platelets   Date Value Ref Range Status   12/04/2024 224 140 - 450 10*3/mm3 Final   05/31/2024 210 140 - 450 10*3/mm3 Final   12/01/2023 227 140 - 450 10*3/mm3 Final   09/08/2023 209 140 - 450 10*3/mm3 Final   09/02/2023 239 140 - 450 10*3/mm3 Final   09/01/2023 221 140 - 450 10*3/mm3 Final   05/16/2023 213 140 - 450 10*3/mm3 Final   03/28/2023 200 140 - 450 10*3/mm3 Final   02/28/2023 174 140 - 450 10*3/mm3 Final   02/07/2023 169 140 - 450 10*3/mm3 Final   01/17/2023 170 140 - 450 10*3/mm3 Final   01/10/2023 202 140 - 450 10*3/mm3 Final   12/27/2022 285 140 - 450 10*3/mm3 Final   12/05/2022 299 140 - 450 10*3/mm3 Final   11/29/2022 327 140 - 450 10*3/mm3 Final   11/12/2022 321 140 - 450 10*3/mm3 Final   11/07/2022 330 140 - 450 10*3/mm3 Final   10/20/2022 327 140 - 450 10*3/mm3 Final       Assessment &  Plan   There are no diagnoses linked to this encounter.        Damián Diaz   *Ascending colon adenocarcinoma  Discovered on colonoscopy 11/3/2022, Dr. Oconnell, for [facility]A work-up.  Invasive moderately differentiated adenocarcinoma.  CT AP 11/7/2022: Circumferential malignancy ascending colon with adjacent mesenteric LAD.  4 mm RML nodule stable from 6/8/2022.  Radiologist felt likely benign but recommended continued follow-up.  Granulomatous calcifications both lower lobes.  Resection 11/11/2022, Dr. Tolentino: Moderately differentiated adenocarcinoma of cecum, 5.2 cm.  Grade 2.  Invades through muscularis propria into pericolic fat.  Margins negative.  No perineural invasion or lymphovascular invasion.  One of the 30 nodes positive.  [id number], stage 3  No deficiency of MMR proteins.  (pMMR) (consistent with STEFANIA)  Per NCCN guidelines: Low risk stage III colon cancer preferred regimens are Capeox x3 months versus FOLFOX x3 to 6 months.  Reviewed these options with the patient.  He has chosen Capeox x3 months  Adjuvant CAPEOX x4 cycles 12/27/22-2/28/2023  Oxaliplatin D1.  Xeloda 850 mg/m2 per dose (2150 mg rounding for tablet strength), twice per day, D1-14/21 days.  4 cycles total, which is 3 months of therapy.  CT 5/16/2023: 2 right mesenteric nodes near the anastomotic sites, anterior to lower pole of right kidney, mildly enlarged.  Radiologist stated these could be recurrence or reactive and recommended either follow-up CT 3 months or PET.  Discussed with Dr. Tolentino.  He states these nodes cannot be reached by CT-guided needle biopsy.  I asked Dr. Tolentino if a PET scan is done and it shows activity in these 2 mesenteric nodes if he felt the patient should have an operation (knowing reactive nodes can show activity on PET).  Dr. Tolentino recommends not doing a PET scan and instead doing a follow-up CT in 3 months.  I agree.  Discussed all of this with the patient and he agrees as well.  CT 9/1/2023: Stable pulmonary  nodules, no other recurrence.  Specifically the mesenteric nodes are read as stable, 9 and 6 mm in short axis.  CT 11/27/2023: Previously mentioned tiny RML nodule is now thought to represent a calcified granuloma.  Additional benign calcified granulomas elsewhere in both lungs.  Stable 2 small mesenteric nodes near the anastomosis.  Return to 6-month follow-up CT plan.  CT 5/24/2024: Stable RML 4 mm nodule.  2 small stable nodes lateral to anastomosis of right hemicolectomy.  Radiologist stated likely benign but recommend continued follow-up CT.  CT 12/4/ 24: Stable.  No evidence of recurrence    *Pulmonary nodules  Although only seen in hindsight, first seen as a RML pulmonary nodule on CT 6/8/2022, per CT chest 12/20/2022, when it was read as unchanged from 6/8/2022  CT 5/16/2023: No change in 4 mm RML nodule from 6/8/2022.  CT 9/1/23: Stable pulmonary nodules.  For example TIFFANY 5 mm nodule.  Note previously only and RML pulmonary nodule was read but now in hindsight the radiologist states stable pulmonary nodules and specifically mentions an TIFFANY nodule at 5 mm.  CT 11/27/2023: This was read as stable benign calcified granulomas bilateral lungs  CT 5/24/2024: Stable RML 4 mm nodule  CT 12/4/ 24: No new suspicious pulmonary nodules or abnormal pulmonary masses    *The day after his CT went to the ER with right flank pain radiating to RLQ.  CT repeated on 9/2/2023: Right ureteral stone was found.  He was sent home from the ER    *Iron deficiency anemia  6/21/2022: Hb 7.3.  Oral iron daily started.  Patient states early October 2022 Hb around 8.  10/20/2022 (initial consult with me): Ferritin 9.  3% saturation.  Hb 9.  Patient states he cannot tolerate oral iron anymore due to constipation and abdominal cramping.    Insurance requires Venofer instead of Injectafer  Completed 1000 mg Venofer on 11/29/2022.  12/5/2022: Ferritin 124, 39% saturation.  Hb 16.5    *Source of iron deficiency  Colon cancer found and resected  on 2022    *Microcytosis, likely due to iron deficiency  MCV 86.1    *Lightheadedness, dyspnea on exertion, fatigue.  Hopefully this will improve with IV iron.    *Acute kidney injury  Creatinine was up to 1.28 at early 2023 ER visit for kidney stones.  Because of this, creatinine repeated in our office, 2023 and has returned to baseline, 0.86.  Creatinine 0.9    *Coronary calcifications on CT, and several paternal uncles with CAD (dad  at the young age of 41 from PE)  2023: Referred to cardio oncology    *Class III obesity.  Being overweight can lead to cytopenias through hepatic steatosis.    Body mass index is 41.32 kg/m².  BMI 25 to <30 is overweight  BMI 30 to <35 is class 1 obesity  BMI 35 to <40 is class 2 obesity  BMI 40 or higher is class 3 obesity   Ideal body weight 181 pounds  Remaining overweight.  Ideally, lose weight    *Aortic aneurysm  CT chest angiogram : Ascending aorta 4.2 cm.  Following with cardio oncology  CT : No change thoracic aortic aneurysm    *2024: Large floater right eye x 2 days.  We will get him into an eye doctor    Plan  MD 6 months.  1 week prior:  CT CAP with contrast, CBC, CMP, CEA.   Cardio oncology has requested CT chest be CT chest angiogram, to follow the aneurysm  Plan CTs every 6 months x 5 years, until around 2028)  Last colonoscopy 2024: Unremarkable.  Dr. Tolentino recommended the next 3 years later  Appointment with an eye doctor soon for the new onset large floater right eye

## 2024-12-06 ENCOUNTER — OFFICE VISIT (OUTPATIENT)
Dept: ONCOLOGY | Facility: CLINIC | Age: 59
End: 2024-12-06
Payer: MEDICAID

## 2024-12-06 ENCOUNTER — TELEPHONE (OUTPATIENT)
Dept: ONCOLOGY | Facility: CLINIC | Age: 59
End: 2024-12-06
Payer: MEDICAID

## 2024-12-06 VITALS
HEART RATE: 70 BPM | SYSTOLIC BLOOD PRESSURE: 134 MMHG | OXYGEN SATURATION: 96 % | HEIGHT: 74 IN | TEMPERATURE: 97.8 F | RESPIRATION RATE: 16 BRPM | BODY MASS INDEX: 40.43 KG/M2 | DIASTOLIC BLOOD PRESSURE: 83 MMHG | WEIGHT: 315 LBS

## 2024-12-06 DIAGNOSIS — C18.2 CANCER OF ASCENDING COLON: Primary | ICD-10-CM

## 2024-12-06 PROCEDURE — 1126F AMNT PAIN NOTED NONE PRSNT: CPT | Performed by: INTERNAL MEDICINE

## 2024-12-06 PROCEDURE — 99214 OFFICE O/P EST MOD 30 MIN: CPT | Performed by: INTERNAL MEDICINE

## 2024-12-06 NOTE — TELEPHONE ENCOUNTER
----- Message from Angelina BAKER sent at 12/6/2024 12:44 PM EST -----  Called pt and he is calling [facility] and seeing if they can work in at another location sooner. He said he is willing to go to another location. He has their number.  Alternatively I told him to call me back if they could not accommodate in an acceptable time frame to his liking as the optometrist I go to usually can get a patient in within a day or so located in the [place]. He vu to all.    Thank  you, Rubi  ----- Message -----  From: Jesus Ansari II, MD  Sent: 12/6/2024  10:45 AM EST  To: Terry Gutierrez    I advise he not wait until the end of the month.  I think it would be better to see an optometrist than waiting till the end of the month for an ophthalmologist.  I would advise he be willing to try to what ever location can get him in relatively soon.  ----- Message -----  From: Angelina Rosenthal, Terry Rep  Sent: 12/6/2024  10:05 AM EST  To: Jesus Ansari II, MD    Tried getting him in with Ophthalmologist - Dr Jernigan could get him in this afternoon in [place] but pt not willing to wait until one today.    Have set him up with [facility] in [place] - not until end of month. He lives in [facility] so I will advise pt to call them, perhaps he is willing to go to another location to be seen sooner. I also don't know his work schedule.    Christoph and I agree that if he sees optometrist they will just send to Ophthalmologist.    Wanted to document and let you know, thank you, Rubi

## 2024-12-09 ENCOUNTER — TELEPHONE (OUTPATIENT)
Dept: ONCOLOGY | Facility: CLINIC | Age: 59
End: 2024-12-09
Payer: MEDICAID

## 2024-12-09 NOTE — TELEPHONE ENCOUNTER
----- Message from Meliza VILLASEÑOR sent at 12/9/2024 10:51 AM EST -----  Regarding: RE: scans needed for this patient  NO WORRIES  ----- Message -----  From: Angelina Rosenthal RegSched Rep  Sent: 12/9/2024   8:41 AM EST  To: Terry Malik; #  Subject: RE: scans needed for this patient                Loooong story, I am calling him today to . I did - he was to call them and see if they had anything at another location sooner - or go to an optometrist.    Calling him later this am - thank youRubi  ----- Message -----  From: Meliza Ward RegSched Rep  Sent: 12/9/2024   8:07 AM EST  To: Terry Gutierrez  Subject: scans needed for this patient                    Hey were you able to get the patient in with an eye doctor, that is needed as well       Thank you   Meliza

## 2024-12-26 RX ORDER — NEBIVOLOL 2.5 MG/1
2.5 TABLET ORAL DAILY
Qty: 90 TABLET | Refills: 2 | Status: SHIPPED | OUTPATIENT
Start: 2024-12-26

## 2025-01-14 ENCOUNTER — TELEPHONE (OUTPATIENT)
Dept: CARDIOLOGY | Facility: CLINIC | Age: 60
End: 2025-01-14
Payer: MEDICAID

## 2025-01-14 NOTE — TELEPHONE ENCOUNTER
Caller: Damián Diaz    Relationship to patient: Self    Best call back number: [phone]     Patient is needing: PT WAS SET UP ON PAP MACHINE AND PT IS HAVING A HARD TIME GETTING SUPPLIES FOR THIS. IS THEIR SOMEONE THAT CAN ASSIST WITH THIS?

## 2025-01-20 NOTE — TELEPHONE ENCOUNTER
MA reached out to pt who is aware he will need to contact  pertaining to this issue. MA provided pt with number as he was concerned about the cost of a CPAP machine and supplies.     Nothing further needs to be done, message completed.

## 2025-04-28 ENCOUNTER — DOCUMENTATION (OUTPATIENT)
Dept: ONCOLOGY | Facility: CLINIC | Age: 60
End: 2025-04-28
Payer: MEDICAID

## 2025-04-28 ENCOUNTER — TELEPHONE (OUTPATIENT)
Dept: ONCOLOGY | Facility: CLINIC | Age: 60
End: 2025-04-28
Payer: MEDICAID

## 2025-04-28 ENCOUNTER — TELEPHONE (OUTPATIENT)
Dept: ONCOLOGY | Facility: CLINIC | Age: 60
End: 2025-04-28

## 2025-04-28 NOTE — TELEPHONE ENCOUNTER
Call placed to patient per Dr. Ansari's request. No answer but did get voicemail that identified him specifically. Left a VMM to advised that his insurance will not cover 6 month surveillance CAT scans. Advised they will only cover annual CAT scans at this point, through 5 years of follow-up from the date of surgery. Advised he should continue to see Dr. Ansari but he will wait on CT until 1 year after the last CT, which was 12/04/24. Advised if he has any questions, please call us back. Left my name and number in care of need.

## 2025-04-28 NOTE — TELEPHONE ENCOUNTER
"----- Message from Jesus Ansari sent at 4/28/2025 12:05 PM EDT -----  Note to self: I was informed his insurance refuses to cover 6-month surveillance CTNCCN guidelines recommend CT every 6 to 12 months for 5 years from date of resection.  Therefore, my plan is within NCCN guidelines.  However, if the insurance guidelines allow annual CT through 5 years from date of resection that is also within the NCCN guidelines.Maryann, please call the patient and tell him his insurance will only cover annual CAT scans at this point through 5 years of follow-up from the date of surgery.  Therefore, he should still see us.  Further provider visit but we will wait on CT until 1 year after the last CT as his insurance has stopped covering every 6 months CT.  ----- Message -----  From: Meliza Monroy  Sent: 4/28/2025  10:34 AM EDT  To: Jesus Ansari II, MD; Stephanie Cosme RN; Mgk#    Patient's ct abdomen/pelvis and cta were denied by insurance. NPs asked that I check with you first before moving forward with peer to peer, because of the denial reason. Per insurance, this was denied per insurance guidelines and \"Follow-up imaging can be done once after surgery, then once a year for 5 years\". Patient last had scans on 12/4/24.Do you want the NPs to move forward with the peer to peer?  "

## 2025-04-28 NOTE — PROGRESS NOTES
Note to self: I was informed his insurance refuses to cover 6-month surveillance CT    NCCN guidelines recommend CT every 6 to 12 months for 5 years from date of resection.  Therefore, my plan is within NCCN guidelines.  However, if the insurance guidelines allow annual CT through 5 years from date of resection that is also within the NCCN guidelines.    Maryann, please call the patient and tell him his insurance will only cover annual CAT scans at this point through 5 years of follow-up from the date of surgery.  Therefore, he should still see us.  Further provider visit but we will wait on CT until 1 year after the last CT as his insurance has stopped covering every 6 months CT.

## 2025-04-28 NOTE — TELEPHONE ENCOUNTER
Hub staff attempted to follow warm transfer process and was unsuccessful     Caller: Damián Diaz    Relationship to patient: Self    Best call back number: [phone]     Patient is needing: PT HAD A MISSED CALL. PT STATES HE HAS AN APPT FOR A CT THIS FRIDAY. ADVISED PT THAT HE HAS A MESSAGE FROM [employer]. PT WILL CALL BACK IF ANY ADDITIONAL QUESTIONS.

## 2025-07-01 ENCOUNTER — OFFICE VISIT (OUTPATIENT)
Dept: ONCOLOGY | Facility: CLINIC | Age: 60
End: 2025-07-01
Payer: MEDICAID

## 2025-07-01 ENCOUNTER — LAB (OUTPATIENT)
Dept: OTHER | Facility: HOSPITAL | Age: 60
End: 2025-07-01
Payer: MEDICAID

## 2025-07-01 VITALS
BODY MASS INDEX: 40.43 KG/M2 | SYSTOLIC BLOOD PRESSURE: 142 MMHG | TEMPERATURE: 96.9 F | DIASTOLIC BLOOD PRESSURE: 89 MMHG | OXYGEN SATURATION: 95 % | HEART RATE: 62 BPM | HEIGHT: 74 IN | WEIGHT: 315 LBS

## 2025-07-01 DIAGNOSIS — C18.2 CANCER OF ASCENDING COLON: Primary | ICD-10-CM

## 2025-07-01 DIAGNOSIS — C18.2 CANCER OF ASCENDING COLON: ICD-10-CM

## 2025-07-01 LAB
ALBUMIN SERPL-MCNC: 4.4 G/DL (ref 3.5–5.2)
ALBUMIN/GLOB SERPL: 1.2 G/DL
ALP SERPL-CCNC: 74 U/L (ref 39–117)
ALT SERPL W P-5'-P-CCNC: 24 U/L (ref 1–41)
ANION GAP SERPL CALCULATED.3IONS-SCNC: 7.7 MMOL/L (ref 5–15)
AST SERPL-CCNC: 22 U/L (ref 1–40)
BASOPHILS # BLD AUTO: 0.06 10*3/MM3 (ref 0–0.2)
BASOPHILS NFR BLD AUTO: 0.6 % (ref 0–1.5)
BILIRUB SERPL-MCNC: 0.3 MG/DL (ref 0–1.2)
BUN SERPL-MCNC: 14.8 MG/DL (ref 6–20)
BUN/CREAT SERPL: 15.4 (ref 7–25)
CALCIUM SPEC-SCNC: 9.5 MG/DL (ref 8.6–10.5)
CEA SERPL-MCNC: 1.59 NG/ML
CHLORIDE SERPL-SCNC: 103 MMOL/L (ref 98–107)
CO2 SERPL-SCNC: 28.3 MMOL/L (ref 22–29)
CREAT SERPL-MCNC: 0.96 MG/DL (ref 0.76–1.27)
DEPRECATED RDW RBC AUTO: 44 FL (ref 37–54)
EGFRCR SERPLBLD CKD-EPI 2021: 92.2 ML/MIN/1.73
EOSINOPHIL # BLD AUTO: 0.22 10*3/MM3 (ref 0–0.4)
EOSINOPHIL NFR BLD AUTO: 2.2 % (ref 0.3–6.2)
ERYTHROCYTE [DISTWIDTH] IN BLOOD BY AUTOMATED COUNT: 13.6 % (ref 12.3–15.4)
GLOBULIN UR ELPH-MCNC: 3.7 GM/DL
GLUCOSE SERPL-MCNC: 98 MG/DL (ref 65–99)
HCT VFR BLD AUTO: 46.8 % (ref 37.5–51)
HGB BLD-MCNC: 15.9 G/DL (ref 13–17.7)
IMM GRANULOCYTES # BLD AUTO: 0.06 10*3/MM3 (ref 0–0.05)
IMM GRANULOCYTES NFR BLD AUTO: 0.6 % (ref 0–0.5)
LYMPHOCYTES # BLD AUTO: 3.8 10*3/MM3 (ref 0.7–3.1)
LYMPHOCYTES NFR BLD AUTO: 38 % (ref 19.6–45.3)
MCH RBC QN AUTO: 30.1 PG (ref 26.6–33)
MCHC RBC AUTO-ENTMCNC: 34 G/DL (ref 31.5–35.7)
MCV RBC AUTO: 88.6 FL (ref 79–97)
MONOCYTES # BLD AUTO: 0.7 10*3/MM3 (ref 0.1–0.9)
MONOCYTES NFR BLD AUTO: 7 % (ref 5–12)
NEUTROPHILS NFR BLD AUTO: 5.17 10*3/MM3 (ref 1.7–7)
NEUTROPHILS NFR BLD AUTO: 51.6 % (ref 42.7–76)
NRBC BLD AUTO-RTO: 0 /100 WBC (ref 0–0.2)
PLATELET # BLD AUTO: 200 10*3/MM3 (ref 140–450)
PMV BLD AUTO: 8.7 FL (ref 6–12)
POTASSIUM SERPL-SCNC: 4 MMOL/L (ref 3.5–5.2)
PROT SERPL-MCNC: 8.1 G/DL (ref 6–8.5)
RBC # BLD AUTO: 5.28 10*6/MM3 (ref 4.14–5.8)
SODIUM SERPL-SCNC: 139 MMOL/L (ref 136–145)
WBC NRBC COR # BLD AUTO: 10.01 10*3/MM3 (ref 3.4–10.8)

## 2025-07-01 PROCEDURE — 36415 COLL VENOUS BLD VENIPUNCTURE: CPT

## 2025-07-01 PROCEDURE — 82378 CARCINOEMBRYONIC ANTIGEN: CPT | Performed by: INTERNAL MEDICINE

## 2025-07-01 PROCEDURE — 80053 COMPREHEN METABOLIC PANEL: CPT | Performed by: INTERNAL MEDICINE

## 2025-07-01 PROCEDURE — 85025 COMPLETE CBC W/AUTO DIFF WBC: CPT | Performed by: INTERNAL MEDICINE

## 2025-07-01 NOTE — PROGRESS NOTES
.     REASON FOR FOLLOWUP :   Resected colon cancer, iron deficiency anemia    HISTORY OF PRESENT ILLNESS:  The patient is a 59 y.o. year old male  who is here for follow-up with the above-mentioned history.    Feels good.  Golfing a lot.  Working outside.  No unexplained areas of pain.  No SOA or nausea.  No weight loss    Past Medical History:   Diagnosis Date    Chronic cough     SINCE COVID DIAGNOSIS 9/2021    Colon cancer 11/03/2022    Ascending colon invasive moderately differentiated adenocarcinoma    Colon polyps 11/03/2022    Transverse colon: fragments of tubular adenoma, rectum: fragments of tubular adenoma and hyperplastic polyp    History of COVID-19 09/2021    Hypertension     Hypoxemia associated with sleep     Iron deficiency anemia     JUAN C (obstructive sleep apnea) 10/16/2023    Home sleep study.  Weight 325 pounds.  Severe JUAN C with AHI 40 events per hour.  Low oxygen saturation 68% and sleep-related hypoxia present for 114.6 minutes     Past Surgical History:   Procedure Laterality Date    CHOLECYSTECTOMY WITH INTRAOPERATIVE CHOLANGIOGRAM N/A 11/11/2022    Procedure: LAPAROSCOPIC CHOLECYSTECTOMY;  Surgeon: Nigel Tolentino MD;  Location: [facility] OR;  Service: General;  Laterality: N/A;    COLON RESECTION Right 11/11/2022    Procedure: COLON RESECTION RIGHT;  Surgeon: Nigel Tolentino MD;  Location: [facility] OR;  Service: General;  Laterality: Right;    COLONOSCOPY N/A 11/03/2022    Procedure: COLONOSCOPY into cecum with tattoo ink injection, bx's and cold bx/snare polypectomies;  Surgeon: Anup Oconnell MD;  Location: [facility] ENDOSCOPY;  Service: Gastroenterology;  Laterality: N/A;  pre: iron def anemia, heme positive stool  post: polyps, colon mass    COLONOSCOPY N/A 2/29/2024    Procedure: COLONOSCOPY;  Surgeon: Nigel Tolentino MD;  Location: [facility] OR;  Service: Gastroenterology;  Laterality: N/A;  normal exam    ENDOSCOPY N/A 11/03/2022    Procedure:  ESOPHAGOGASTRODUODENOSCOPYr with bx;  Surgeon: Anup Oconnell MD;  Location: [facility] ENDOSCOPY;  Service: Gastroenterology;  Laterality: N/A;  pre:  iron def anemia, heme positive stool  post: gastritis     INGUINAL HERNIA REPAIR Bilateral 1970    LACERATION REPAIR Right     THUMB/ HAND       MEDICATIONS    Current Outpatient Medications:     amLODIPine (NORVASC) 10 MG tablet, , Disp: , Rfl:     aspirin (Aspirin Low Dose) 81 MG EC tablet, Take 1 tablet by mouth Daily., Disp: 30 tablet, Rfl: 6    hydroCHLOROthiazide (HYDRODIURIL) 25 MG tablet, , Disp: , Rfl:     losartan (Cozaar) 50 MG tablet, Take 1 tablet by mouth Daily., Disp: 90 tablet, Rfl: 3    meloxicam (MOBIC) 7.5 MG tablet, Take 1 tablet by mouth., Disp: , Rfl:     nebivolol (Bystolic) 2.5 MG tablet, Take 1 tablet by mouth Daily., Disp: 90 tablet, Rfl: 2    ALLERGIES:   No Known Allergies    SOCIAL HISTORY:       Social History     Socioeconomic History    Marital status: Single   Tobacco Use    Smoking status: Never     Passive exposure: Never    Smokeless tobacco: Former     Types: Chew    Tobacco comments:     Caffeine: coffee    Vaping Use    Vaping status: Never Used   Substance and Sexual Activity    Alcohol use: Yes     Comment: rarely    Drug use: Never    Sexual activity: Defer     Partners: Female         FAMILY HISTORY:  Family History   Problem Relation Age of Onset    Heart failure Mother     Clotting disorder Father         DVT    Lung cancer Sister     Hypertension Brother     Colon cancer Neg Hx     Crohn's disease Neg Hx     Colon polyps Neg Hx     Irritable bowel syndrome Neg Hx     Ulcerative colitis Neg Hx     Malig Hyperthermia Neg Hx        REVIEW OF SYSTEMS:  Review of Systems   Constitutional:  Negative for activity change.   HENT:  Negative for nosebleeds and trouble swallowing.    Respiratory:  Negative for shortness of breath and wheezing.    Cardiovascular:  Negative for chest pain and palpitations.   Gastrointestinal:   "Negative for diarrhea and nausea.   Genitourinary:  Negative for dysuria and hematuria.   Musculoskeletal:  Negative for arthralgias and myalgias.   Neurological:  Negative for seizures and syncope.   Hematological:  Negative for adenopathy. Does not bruise/bleed easily.   Psychiatric/Behavioral:  Negative for confusion.               Vitals:    07/01/25 1604   BP: 142/89   Pulse: 62   Temp: 96.9 °F (36.1 °C)   TempSrc: Skin   SpO2: 95%   Weight: (!) 150 kg (330 lb)   Height: 188 cm (74.02\")   PainSc: 0-No pain               7/1/2025     4:07 PM   Current Status   ECOG score 0      PHYSICAL EXAM:        CONSTITUTIONAL:  Vital signs reviewed.  No distress, looks comfortable.  EYES:  Conjunctiva and lids unremarkable.  PERRLA  EARS,NOSE,MOUTH,THROAT:  Ears and nose appear unremarkable.  Lips, teeth, gums appear unremarkable.  RESPIRATORY:  Normal respiratory effort.  Lungs clear to auscultation bilaterally.  CARDIOVASCULAR:  Normal S1, S2.  No murmurs rubs or gallops.  No significant lower extremity edema.  GASTROINTESTINAL: Abdomen appears unremarkable.  Nontender.  No hepatomegaly.  No splenomegaly.  LYMPHATIC:  No cervical, supraclavicular, axillary lymphadenopathy.  SKIN:  Warm.  No rashes.  PSYCHIATRIC:  Normal judgment and insight.  Normal mood and affect.         RECENT LABS:        WBC   Date Value Ref Range Status   07/01/2025 10.01 3.40 - 10.80 10*3/mm3 Final   12/04/2024 8.53 3.40 - 10.80 10*3/mm3 Final   05/31/2024 7.09 3.40 - 10.80 10*3/mm3 Final   12/01/2023 8.41 3.40 - 10.80 10*3/mm3 Final   09/08/2023 7.78 3.40 - 10.80 10*3/mm3 Final   09/02/2023 11.58 (H) 3.40 - 10.80 10*3/mm3 Final   09/01/2023 7.72 3.40 - 10.80 10*3/mm3 Final   05/16/2023 8.24 3.40 - 10.80 10*3/mm3 Final   03/28/2023 5.94 3.40 - 10.80 10*3/mm3 Final   02/28/2023 4.78 3.40 - 10.80 10*3/mm3 Final   02/07/2023 4.77 3.40 - 10.80 10*3/mm3 Final   01/17/2023 5.44 3.40 - 10.80 10*3/mm3 Final   01/10/2023 5.67 3.40 - 10.80 10*3/mm3 Final "   12/27/2022 8.46 3.40 - 10.80 10*3/mm3 Final   12/05/2022 6.39 3.40 - 10.80 10*3/mm3 Final   11/29/2022 4.09 3.40 - 10.80 10*3/mm3 Final   11/12/2022 13.53 (H) 3.40 - 10.80 10*3/mm3 Final   11/07/2022 7.22 3.40 - 10.80 10*3/mm3 Final   10/20/2022 6.24 3.40 - 10.80 10*3/mm3 Final     Hemoglobin   Date Value Ref Range Status   07/01/2025 15.9 13.0 - 17.7 g/dL Final   12/04/2024 16.5 13.0 - 17.7 g/dL Final   05/31/2024 15.5 13.0 - 17.7 g/dL Final   12/01/2023 15.3 13.0 - 17.7 g/dL Final   09/08/2023 14.6 13.0 - 17.7 g/dL Final   09/02/2023 14.6 13.0 - 17.7 g/dL Final   09/01/2023 14.8 13.0 - 17.7 g/dL Final   05/16/2023 14.2 13.0 - 17.7 g/dL Final   03/28/2023 13.8 13.0 - 17.7 g/dL Final   02/28/2023 12.0 (L) 13.0 - 17.7 g/dL Final   02/07/2023 12.5 (L) 13.0 - 17.7 g/dL Final   01/17/2023 12.4 (L) 13.0 - 17.7 g/dL Final   01/10/2023 12.2 (L) 13.0 - 17.7 g/dL Final   12/27/2022 11.9 (L) 13.0 - 17.7 g/dL Final   12/05/2022 11.1 (L) 13.0 - 17.7 g/dL Final   11/29/2022 10.1 (L) 13.0 - 17.7 g/dL Final   11/12/2022 8.2 (L) 13.0 - 17.7 g/dL Final   11/07/2022 8.4 (L) 13.0 - 17.7 g/dL Final   10/20/2022 9.0 (L) 13.0 - 17.7 g/dL Final     Platelets   Date Value Ref Range Status   07/01/2025 200 140 - 450 10*3/mm3 Final   12/04/2024 224 140 - 450 10*3/mm3 Final   05/31/2024 210 140 - 450 10*3/mm3 Final   12/01/2023 227 140 - 450 10*3/mm3 Final   09/08/2023 209 140 - 450 10*3/mm3 Final   09/02/2023 239 140 - 450 10*3/mm3 Final   09/01/2023 221 140 - 450 10*3/mm3 Final   05/16/2023 213 140 - 450 10*3/mm3 Final   03/28/2023 200 140 - 450 10*3/mm3 Final   02/28/2023 174 140 - 450 10*3/mm3 Final   02/07/2023 169 140 - 450 10*3/mm3 Final   01/17/2023 170 140 - 450 10*3/mm3 Final   01/10/2023 202 140 - 450 10*3/mm3 Final   12/27/2022 285 140 - 450 10*3/mm3 Final   12/05/2022 299 140 - 450 10*3/mm3 Final   11/29/2022 327 140 - 450 10*3/mm3 Final   11/12/2022 321 140 - 450 10*3/mm3 Final   11/07/2022 330 140 - 450 10*3/mm3 Final    10/20/2022 327 140 - 450 10*3/mm3 Final       Assessment & Plan   There are no diagnoses linked to this encounter.        Damián Diaz   *Ascending colon adenocarcinoma  Discovered on colonoscopy 11/3/2022, Dr. Oconnell, for MINA work-up.  Invasive moderately differentiated adenocarcinoma.  CT AP 11/7/2022: Circumferential malignancy ascending colon with adjacent mesenteric LAD.  4 mm RML nodule stable from 6/8/2022.  Radiologist felt likely benign but recommended continued follow-up.  Granulomatous calcifications both lower lobes.  Resection 11/11/2022, Dr. Tolentino: Moderately differentiated adenocarcinoma of cecum, 5.2 cm.  Grade 2.  Invades through muscularis propria into pericolic fat.  Margins negative.  No perineural invasion or lymphovascular invasion.  One of the 30 nodes positive.  [id number], stage 3  No deficiency of MMR proteins.  (pMMR) (consistent with STEFANIA)  Per NCCN guidelines: Low risk stage III colon cancer preferred regimens are Capeox x3 months versus FOLFOX x3 to 6 months.  Reviewed these options with the patient.  He has chosen Capeox x3 months  Adjuvant CAPEOX x4 cycles 12/27/22-2/28/2023  Oxaliplatin D1.  Xeloda 850 mg/m2 per dose (2150 mg rounding for tablet strength), twice per day, D1-14/21 days.  4 cycles total, which is 3 months of therapy.  CT 5/16/2023: 2 right mesenteric nodes near the anastomotic sites, anterior to lower pole of right kidney, mildly enlarged.  Radiologist stated these could be recurrence or reactive and recommended either follow-up CT 3 months or PET.  Discussed with Dr. Tolentino.  He states these nodes cannot be reached by CT-guided needle biopsy.  I asked Dr. Tolentino if a PET scan is done and it shows activity in these 2 mesenteric nodes if he felt the patient should have an operation (knowing reactive nodes can show activity on PET).  Dr. Tolentino recommends not doing a PET scan and instead doing a follow-up CT in 3 months.  I agree.  Discussed all of this with the  patient and he agrees as well.  CT 9/1/2023: Stable pulmonary nodules, no other recurrence.  Specifically the mesenteric nodes are read as stable, 9 and 6 mm in short axis.  CT 11/27/2023: Previously mentioned tiny RML nodule is now thought to represent a calcified granuloma.  Additional benign calcified granulomas elsewhere in both lungs.  Stable 2 small mesenteric nodes near the anastomosis.  Return to 6-month follow-up CT plan.  CT 5/24/2024: Stable RML 4 mm nodule.  2 small stable nodes lateral to anastomosis of right hemicolectomy.  Radiologist stated likely benign but recommend continued follow-up CT.  CT 12/4/ 24: Stable.  No evidence of recurrence  7/1/2025: His insurance only agrees to annual surveillance CT.  CEA 1.59    *Pulmonary nodules  Although only seen in hindsight, first seen as a RML pulmonary nodule on CT 6/8/2022, per CT chest 12/20/2022, when it was read as unchanged from 6/8/2022  CT 5/16/2023: No change in 4 mm RML nodule from 6/8/2022.  CT 9/1/23: Stable pulmonary nodules.  For example TIFFANY 5 mm nodule.  Note previously only and RML pulmonary nodule was read but now in hindsight the radiologist states stable pulmonary nodules and specifically mentions an TIFFANY nodule at 5 mm.  CT 11/27/2023: This was read as stable benign calcified granulomas bilateral lungs  CT 5/24/2024: Stable RML 4 mm nodule  CT 12/4/ 24: No new suspicious pulmonary nodules or abnormal pulmonary masses    *The day after his CT went to the ER with right flank pain radiating to RLQ.  CT repeated on 9/2/2023: Right ureteral stone was found.  He was sent home from the ER    *Iron deficiency anemia  6/21/2022: Hb 7.3.  Oral iron daily started.  Patient states early October 2022 Hb around 8.  10/20/2022 (initial consult with me): Ferritin 9.  3% saturation.  Hb 9.  Patient states he cannot tolerate oral iron anymore due to constipation and abdominal cramping.    Insurance requires Venofer instead of Injectafer  Completed 1000 mg  Venofer on 2022.  2022: Ferritin 124, 39% saturation.  Hb 15.9    *Source of iron deficiency  Colon cancer found and resected on 2022    *Microcytosis, likely due to iron deficiency  MCV 88.6    *Lightheadedness, dyspnea on exertion, fatigue.  Hopefully this will improve with IV iron.    *Acute kidney injury  Creatinine was up to 1.28 at early 2023 ER visit for kidney stones.  Because of this, creatinine repeated in our office, 2023 and has returned to baseline, 0.86.  Creatinine 0.9    *Coronary calcifications on CT, and several paternal uncles with CAD (dad  at the young age of 41 from PE)  2023: Referred to cardio oncology    *Class III obesity.  Being overweight can lead to cytopenias through hepatic steatosis.    Body mass index is 42.35 kg/m².  BMI 25 to <30 is overweight  BMI 30 to <35 is class 1 obesity  BMI 35 to <40 is class 2 obesity  BMI 40 or higher is class 3 obesity   Ideal body weight 181 pounds  Remains overweight.  Ideally, lose weight    *Aortic aneurysm  CT chest angiogram : Ascending aorta 4.2 cm.  Following with cardio oncology  CT : No change thoracic aortic aneurysm    *2024: Large floater right eye x 2 days.  We will get him into an eye doctor    Plan  MD 6 months.  1 week prior:  CT CAP with contrast, CBC, CMP, CEA.   Cardio oncology has requested CT chest be CT chest angiogram, to follow the aneurysm  Plan CTs every 1 year x 5 years, until around 2028)  (His insurance will only cover annual surveillance CT)  Last colonoscopy 2024: Unremarkable.  Dr. Tolentino recommended the next 3 years later    41 minutes.  Total time.  Same day

## 2025-08-05 RX ORDER — LOSARTAN POTASSIUM 50 MG/1
50 TABLET ORAL DAILY
Qty: 90 TABLET | Refills: 2 | Status: SHIPPED | OUTPATIENT
Start: 2025-08-05

## (undated) DEVICE — ENDOPATH XCEL BLADELESS TROCARS WITH STABILITY SLEEVES: Brand: ENDOPATH XCEL

## (undated) DEVICE — GLV SURG BIOGEL LTX PF 6

## (undated) DEVICE — SOL IRR H2O BTL 1000ML STRL

## (undated) DEVICE — WOUND RETRACTOR AND PROTECTOR: Brand: ALEXIS WOUND PROTECTOR-RETRACTOR

## (undated) DEVICE — LAPAROSCOPIC SMOKE ELIMINATION DEVICE: Brand: PNEUVIEW XE

## (undated) DEVICE — SOL NACL 0.9PCT 1000ML

## (undated) DEVICE — TRAP FLD MINIVAC MEGADYNE 100ML

## (undated) DEVICE — LN SMPL CO2 SHTRM SD STREAM W/M LUER

## (undated) DEVICE — SINGLE-USE BIOPSY FORCEPS: Brand: RADIAL JAW 4

## (undated) DEVICE — CATH CHOLANG 4.5F18IN BRGNDY

## (undated) DEVICE — DISPOSABLE GRASPER CARTRIDGE: Brand: DIRECT DRIVE REPOSABLE GRASPERS

## (undated) DEVICE — SUT SILK 3/0 TIES 18IN A184H

## (undated) DEVICE — ENDOPATH XCEL UNIVERSAL TROCAR STABLILITY SLEEVES: Brand: ENDOPATH XCEL

## (undated) DEVICE — CATH IV INSYTE AUTOGARD 14G 1 1/2IN ORNG

## (undated) DEVICE — SYR LUERLOK 30CC

## (undated) DEVICE — PREP SOL POVIDONE/IODINE BT 4OZ

## (undated) DEVICE — LINER SURG CANSTR SXN S/RIGD 1500CC

## (undated) DEVICE — LAPAROVUE VISIBILITY SYSTEM LAPAROSCOPIC SOLUTIONS: Brand: LAPAROVUE

## (undated) DEVICE — SUT SILK 2/0 SH CR8 18IN CR8 C012D

## (undated) DEVICE — GOWN ,SIRUS,NONREINFORCED SMALL: Brand: MEDLINE

## (undated) DEVICE — ENDOPATH PNEUMONEEDLE INSUFFLATION NEEDLES WITH LUER LOCK CONNECTORS 120MM: Brand: ENDOPATH

## (undated) DEVICE — ENDOCUT SCISSOR TIP, DISPOSABLE: Brand: RENEW

## (undated) DEVICE — GLV SURG PREMIERPRO ORTHO LTX PF SZ7.5 BRN

## (undated) DEVICE — CANN O2 ETCO2 FITS ALL CONN CO2 SMPL A/ 7IN DISP LF

## (undated) DEVICE — SUT VIC 0 TN 27IN DYED JTN0G

## (undated) DEVICE — ADAPT CLN BIOGUARD AIR/H2O DISP

## (undated) DEVICE — SNAR POLYP CAPTIVATOR RND STFF 2.4 240CM 10MM 1P/U

## (undated) DEVICE — BRSH CLN ENDO CH 2200MM

## (undated) DEVICE — ENDOPOUCH RETRIEVER SPECIMEN RETRIEVAL BAGS: Brand: ENDOPOUCH RETRIEVER

## (undated) DEVICE — EXTENSION SET, MALE LUER LOCK ADAPTER WITH RETRACTABLE COLLAR

## (undated) DEVICE — DRAPE,REIN 53X77,STERILE: Brand: MEDLINE

## (undated) DEVICE — HARMONIC ACE +7 LAPAROSCOPIC SHEARS ADVANCED HEMOSTASIS 5MM DIAMETER 36CM SHAFT LENGTH  FOR USE WITH GRAY HAND PIECE ONLY: Brand: HARMONIC ACE

## (undated) DEVICE — ECHELON FLEX 60 ARTICULATING ENDOSCOPIC LINEAR CUTTER (NO CARTRIDGE): Brand: ECHELON FLEX ENDOPATH

## (undated) DEVICE — SUT VIC 5/0 PS2 18IN J495H

## (undated) DEVICE — LOU LAP SIGMOID COLON: Brand: MEDLINE INDUSTRIES, INC.

## (undated) DEVICE — TUBING, SUCTION, 1/4" X 10', STRAIGHT: Brand: MEDLINE

## (undated) DEVICE — SKIN PREP TRAY W/CHG: Brand: MEDLINE INDUSTRIES, INC.

## (undated) DEVICE — KT ORCA ORCAPOD DISP STRL

## (undated) DEVICE — PATIENT RETURN ELECTRODE, SINGLE-USE, CONTACT QUALITY MONITORING, ADULT, WITH 9FT CORD, FOR PATIENTS WEIGING OVER 33LBS. (15KG): Brand: MEGADYNE

## (undated) DEVICE — Device

## (undated) DEVICE — THE SINGLE USE ETRAP – POLYP TRAP IS USED FOR SUCTION RETRIEVAL OF ENDOSCOPICALLY REMOVED POLYPS.: Brand: ETRAP

## (undated) DEVICE — THE CARR-LOCKE INJECTION NEEDLE IS A SINGLE USE, DISPOSABLE, FLEXIBLE SHEATH INJECTION NEEDLE USED FOR THE INJECTION OF VARIOUS TYPES OF MEDIA THROUGH FLEXIBLE ENDOSCOPES.

## (undated) DEVICE — BITEBLOCK OMNI BLOC

## (undated) DEVICE — SUT SILK 2/0 TIES 18IN A185H

## (undated) DEVICE — SUT GUT CHRM 3/0 SH 36IN G172H

## (undated) DEVICE — CONTAINER,SPECIMEN,OR STERILE,4OZ: Brand: MEDLINE

## (undated) DEVICE — STPCK 3WY D201 DISCOFIX

## (undated) DEVICE — DISPOSABLE MONOPOLAR ENDOSCOPIC CORD 10 FT. (3M): Brand: KIRWAN

## (undated) DEVICE — SENSR O2 OXIMAX FNGR A/ 18IN NONSTR

## (undated) DEVICE — SUT PDS 0 CT1 36IN Z346H

## (undated) DEVICE — ADHS SKIN SURG TISS VISC PREMIERPRO EXOFIN HI/VISC FAST/DRY

## (undated) DEVICE — TBG PENCL TELESCP MEGADYNE SMOKE EVAC 10FT

## (undated) DEVICE — DEV COND GAS LAP INSUFLOW W/LUER CONN

## (undated) DEVICE — ELECTRD BLD EZ CLN MOD XLNG 2.75IN

## (undated) DEVICE — TOTAL TRAY, 16FR 10ML SIL FOLEY, URN: Brand: MEDLINE